# Patient Record
Sex: FEMALE | Race: BLACK OR AFRICAN AMERICAN | NOT HISPANIC OR LATINO | Employment: OTHER | URBAN - METROPOLITAN AREA
[De-identification: names, ages, dates, MRNs, and addresses within clinical notes are randomized per-mention and may not be internally consistent; named-entity substitution may affect disease eponyms.]

---

## 2017-02-13 ENCOUNTER — PATIENT OUTREACH (OUTPATIENT)
Dept: ADMINISTRATIVE | Facility: HOSPITAL | Age: 62
End: 2017-02-13

## 2017-02-17 ENCOUNTER — LAB VISIT (OUTPATIENT)
Dept: LAB | Facility: HOSPITAL | Age: 62
End: 2017-02-17
Attending: INTERNAL MEDICINE
Payer: MEDICARE

## 2017-02-17 ENCOUNTER — OFFICE VISIT (OUTPATIENT)
Dept: INTERNAL MEDICINE | Facility: CLINIC | Age: 62
End: 2017-02-17
Payer: MEDICARE

## 2017-02-17 VITALS
BODY MASS INDEX: 40.36 KG/M2 | WEIGHT: 251.13 LBS | SYSTOLIC BLOOD PRESSURE: 140 MMHG | HEIGHT: 66 IN | DIASTOLIC BLOOD PRESSURE: 110 MMHG | HEART RATE: 92 BPM | TEMPERATURE: 98 F | OXYGEN SATURATION: 95 %

## 2017-02-17 DIAGNOSIS — E66.01 MORBID OBESITY WITH BMI OF 40.0-44.9, ADULT: ICD-10-CM

## 2017-02-17 DIAGNOSIS — I10 HYPERTENSION GOAL BP (BLOOD PRESSURE) < 130/80: Primary | ICD-10-CM

## 2017-02-17 DIAGNOSIS — I10 HYPERTENSION GOAL BP (BLOOD PRESSURE) < 130/80: ICD-10-CM

## 2017-02-17 DIAGNOSIS — E78.5 HYPERLIPIDEMIA LDL GOAL <100: ICD-10-CM

## 2017-02-17 LAB
ALBUMIN SERPL BCP-MCNC: 3.8 G/DL
ALP SERPL-CCNC: 134 U/L
ALT SERPL W/O P-5'-P-CCNC: 6 U/L
ANION GAP SERPL CALC-SCNC: 10 MMOL/L
AST SERPL-CCNC: 11 U/L
BILIRUB SERPL-MCNC: 0.6 MG/DL
BUN SERPL-MCNC: 11 MG/DL
CALCIUM SERPL-MCNC: 10 MG/DL
CHLORIDE SERPL-SCNC: 99 MMOL/L
CHOLEST/HDLC SERPL: 3.8 {RATIO}
CO2 SERPL-SCNC: 28 MMOL/L
CREAT SERPL-MCNC: 1.1 MG/DL
CREAT UR-MCNC: 15 MG/DL
EST. GFR  (AFRICAN AMERICAN): >60 ML/MIN/1.73 M^2
EST. GFR  (NON AFRICAN AMERICAN): 53.6 ML/MIN/1.73 M^2
GLUCOSE SERPL-MCNC: 163 MG/DL
HDL/CHOLESTEROL RATIO: 26.1 %
HDLC SERPL-MCNC: 222 MG/DL
HDLC SERPL-MCNC: 58 MG/DL
LDLC SERPL CALC-MCNC: 149.6 MG/DL
MICROALBUMIN UR DL<=1MG/L-MCNC: 39 UG/ML
MICROALBUMIN/CREATININE RATIO: 260 UG/MG
NONHDLC SERPL-MCNC: 164 MG/DL
POTASSIUM SERPL-SCNC: 3.6 MMOL/L
PROT SERPL-MCNC: 9 G/DL
SODIUM SERPL-SCNC: 137 MMOL/L
TRIGL SERPL-MCNC: 72 MG/DL

## 2017-02-17 PROCEDURE — 80061 LIPID PANEL: CPT

## 2017-02-17 PROCEDURE — 83036 HEMOGLOBIN GLYCOSYLATED A1C: CPT

## 2017-02-17 PROCEDURE — 99214 OFFICE O/P EST MOD 30 MIN: CPT | Mod: S$PBB,,, | Performed by: INTERNAL MEDICINE

## 2017-02-17 PROCEDURE — 36415 COLL VENOUS BLD VENIPUNCTURE: CPT

## 2017-02-17 PROCEDURE — 99999 PR PBB SHADOW E&M-EST. PATIENT-LVL III: CPT | Mod: PBBFAC,,, | Performed by: INTERNAL MEDICINE

## 2017-02-17 PROCEDURE — 80053 COMPREHEN METABOLIC PANEL: CPT

## 2017-02-17 RX ORDER — CLONIDINE HYDROCHLORIDE 0.1 MG/1
0.1 TABLET ORAL
Status: COMPLETED | OUTPATIENT
Start: 2017-02-17 | End: 2017-02-17

## 2017-02-17 RX ORDER — AMLODIPINE BESYLATE 5 MG/1
5 TABLET ORAL DAILY
Qty: 30 TABLET | Refills: 3 | Status: SHIPPED | OUTPATIENT
Start: 2017-02-17 | End: 2017-06-27 | Stop reason: SDUPTHER

## 2017-02-17 RX ORDER — METFORMIN HYDROCHLORIDE 500 MG/1
500 TABLET ORAL 2 TIMES DAILY WITH MEALS
Qty: 60 TABLET | Refills: 3 | Status: SHIPPED | OUTPATIENT
Start: 2017-02-17 | End: 2017-06-15 | Stop reason: SDUPTHER

## 2017-02-17 RX ORDER — LISINOPRIL AND HYDROCHLOROTHIAZIDE 12.5; 2 MG/1; MG/1
2 TABLET ORAL DAILY
Qty: 60 TABLET | Refills: 3 | Status: SHIPPED | OUTPATIENT
Start: 2017-02-17 | End: 2017-06-15 | Stop reason: SINTOL

## 2017-02-17 RX ADMIN — CLONIDINE HYDROCHLORIDE 0.1 MG: 0.1 TABLET ORAL at 12:02

## 2017-02-17 NOTE — PROGRESS NOTES
"Subjective:       Patient ID: Carisa Curry is a 62 y.o. female.    Chief Complaint: Annual Exam    HPI Comments: Carisa Curry  62 y.o. Black or  female    Patient presents with:  Annual Exam    HPI: Here today for an annual exam. She has not been seen in a while and is out of all of her medications. Her bp is significantly elevated but she denies any symptoms.   She has diabetes with microalbuminuria. She is out of medication. She has not been monitoring her glucoses.                    HGBA1C                   11.3 (H)            07/15/2015           HLD--not on medication.                     CHOL                     217 (H)             07/01/2015                          HDL                      48                  07/01/2015                            LDLCALC                  154.0               07/01/2015                           TRIG                     75                  07/01/2015                  Past Medical History:    Arthritis                                                     CHF (congestive heart failure)                                Diabetes mellitus, type 2                                     Diabetic retinopathy                                          Hypertension                                                  Current Outpatient Prescriptions on File Prior to Visit:  blood sugar diagnostic Strp, Twice daily glucose testing., Disp: 60 strip, Rfl: 6  insulin needles, disposable, (PEN NEEDLE) 31 X 5/16 " Ndle, Once a day insulin injection., Disp: 30 each, Rfl: 6  lancets Misc, Twice daily glucose monitoring., Disp: 60 each, Rfl: 6  LANTUS SOLOSTAR 100 unit/mL (3 mL) InPn pen, inject 10 units under skin once a day, Disp: 15 mL, Rfl: 1  potassium chloride (KLOR-CON) 10 MEQ TbSR, Take 1 tablet (10 mEq total) by mouth once daily., Disp: 30 tablet, Rfl: 3  tramadol (ULTRAM) 50 mg tablet, Take 1 tablet (50 mg total) by mouth every 12 (twelve) hours as needed for " Pain., Disp: 60 tablet, Rfl: 1  amlodipine (NORVASC) 5 MG tablet, Take 1 tablet (5 mg total) by mouth once daily., Disp: 30 tablet, Rfl: 3  lisinopril-hydrochlorothiazide (PRINZIDE,ZESTORETIC) 20-12.5 mg per tablet, Take 2 tablets by mouth once daily., Disp: 60 tablet, Rfl: 3  blood-glucose meter kit, Use as instructed, Disp: 1 each, Rfl: 0  metformin (GLUCOPHAGE) 500 MG tablet, Take 1 tablet (500 mg total) by mouth 2 (two) times daily with meals., Disp: 60 tablet, Rfl: 3    Allergies:  Review of patient's allergies indicates:  No Known Allergies          Review of Systems   Constitutional: Negative for fever and unexpected weight change.   Respiratory: Negative for cough and shortness of breath.    Cardiovascular: Negative for chest pain and leg swelling.   Gastrointestinal: Negative for abdominal pain.   Genitourinary: Negative for dysuria and hematuria.   Neurological: Negative for dizziness and headaches.       Objective:      Physical Exam   Constitutional: She is oriented to person, place, and time. She appears well-developed and well-nourished. No distress.   Eyes: No scleral icterus.   Cardiovascular: Normal rate, regular rhythm and normal heart sounds.    Pulmonary/Chest: Effort normal and breath sounds normal. No respiratory distress. She has no wheezes. She has no rales.   Abdominal: Soft. Bowel sounds are normal.   Musculoskeletal: She exhibits no edema.   Neurological: She is alert and oriented to person, place, and time.   Skin: Skin is warm and dry.   Psychiatric: She has a normal mood and affect.   Vitals reviewed.      Assessment:       1. Hypertension goal BP (blood pressure) < 130/80    2. Uncontrolled type 2 diabetes mellitus with microalbuminuria, with long-term current use of insulin    3. Hyperlipidemia LDL goal <100    4. Morbid obesity with BMI of 40.0-44.9, adult        Plan:       Carisa was seen today for annual exam.    Diagnoses and all orders for this visit:    Hypertension goal BP  (blood pressure) < 130/80  -     cloNIDine tablet 0.1 mg; Take 1 tablet (0.1 mg total) by mouth one time. B/P improved following administration of clonidine.  -     Advised to resume previous home medications  -     amlodipine (NORVASC) 5 MG tablet; Take 1 tablet (5 mg total) by mouth once daily.  -     lisinopril-hydrochlorothiazide (PRINZIDE,ZESTORETIC) 20-12.5 mg per tablet; Take 2 tablets by mouth once daily.    Uncontrolled type 2 diabetes mellitus with microalbuminuria, with long-term current use of insulin  -     Microalbumin/creatinine urine ratio  -     Check A1C   -     metformin (GLUCOPHAGE) 500 MG tablet; Take 1 tablet (500 mg total) by mouth 2 (two) times daily with meals.    Hyperlipidemia LDL goal <100  -     Check lipid panel     Morbid obesity with BMI of 40.0-44.9, adult  -     Lifestyle modifications    Labs today.    F/U in 4 weeks.

## 2017-02-17 NOTE — MR AVS SNAPSHOT
[facility]  [address] [facility] [address]  Phone: [phone]  Fax: [phone]                  Carisa Curry   2017 11:20 AM   Office Visit    Description:  Female : 1955   Provider:  Guadalupe Guzman DO   Department:  [facility]           Reason for Visit     Annual Exam           Diagnoses this Visit        Comments    Hypertension goal BP (blood pressure) < 130/80    -  Primary     Uncontrolled type 2 diabetes mellitus with microalbuminuria, with long-term current use of insulin         Morbid obesity with BMI of 40.0-44.9, adult                To Do List           Future Appointments        Provider Department Dept Phone    2017 1:45 PM LAB, SAME DAY [facility] [phone]      Goals (5 Years of Data)     None       These Medications        Disp Refills Start End    amlodipine (NORVASC) 5 MG tablet 30 tablet 3 2017     Take 1 tablet (5 mg total) by mouth once daily. - Oral    Pharmacy: [facility] [address] [place] [address] Ph #: [phone]       lisinopril-hydrochlorothiazide (PRINZIDE,ZESTORETIC) 20-12.5 mg per tablet 60 tablet 3 2017     Take 2 tablets by mouth once daily. - Oral    Pharmacy: [facility] [address] [place] [address] #: [phone]       metformin (GLUCOPHAGE) 500 MG tablet 60 tablet 3 2017    Take 1 tablet (500 mg total) by mouth 2 (two) times daily with meals. - Oral    [facility] [address] [place] [address] Ph #: [phone]         [facility]     [facility] -  Assistance  Registered nurses in the [facility] provide clinical advisement, health education, appointment booking, and other advisory services.  Call for this free service at [phone].             Medications           Message  "regarding Medications     Verify the changes and/or additions to your medication regime listed below are the same as discussed with your clinician today.  If any of these changes or additions are incorrect, please notify your healthcare provider.        These medications were administered today        Dose Freq    cloNIDine tablet 0.1 mg 0.1 mg Clinic/HOD 1 time    Sig: Take 1 tablet (0.1 mg total) by mouth one time.    Class: Normal    Route: Oral           Verify that the below list of medications is an accurate representation of the medications you are currently taking.  If none reported, the list may be blank. If incorrect, please contact your healthcare provider. Carry this list with you in case of emergency.           Current Medications     amlodipine (NORVASC) 5 MG tablet Take 1 tablet (5 mg total) by mouth once daily.    blood sugar diagnostic Strp Twice daily glucose testing.    insulin needles, disposable, (PEN NEEDLE) 31 X 5/16 " Ndle Once a day insulin injection.    lancets Misc Twice daily glucose monitoring.    LANTUS SOLOSTAR 100 unit/mL (3 mL) InPn pen inject 10 units under skin once a day    lisinopril-hydrochlorothiazide (PRINZIDE,ZESTORETIC) 20-12.5 mg per tablet Take 2 tablets by mouth once daily.    potassium chloride (KLOR-CON) 10 MEQ TbSR Take 1 tablet (10 mEq total) by mouth once daily.    tramadol (ULTRAM) 50 mg tablet Take 1 tablet (50 mg total) by mouth every 12 (twelve) hours as needed for Pain.    blood-glucose meter kit Use as instructed    metformin (GLUCOPHAGE) 500 MG tablet Take 1 tablet (500 mg total) by mouth 2 (two) times daily with meals.           Clinical Reference Information           Your Vitals Were     BP Pulse Temp Height Weight SpO2    140/110 92 98.4 °F (36.9 °C) (Tympanic) 5' 6" (1.676 m) 113.9 kg (251 lb 1.7 oz) 95%    BMI                40.53 kg/m2          Blood Pressure          Most Recent Value    BP  (!)  140/110      Allergies as of 2/17/2017     No Known " Allergies      Immunizations Administered on Date of Encounter - 2/17/2017     None      Orders Placed During Today's Visit      Normal Orders This Visit    Microalbumin/creatinine urine ratio       Administrations This Visit     cloNIDine tablet 0.1 mg     Admin Date Action Dose Route Administered By             02/17/2017 Given 0.1 mg Oral Sofiya Freeman LPN                      [facility] Sign-Up     Activating your [facility] account is as easy as 1-2-3!     1) Visit [email or web address], select Sign Up Now, enter this activation code and your date of birth, then select Next.  [email or web address]  Expires: 4/3/2017 12:21 PM      2) Create a username and password to use when you visit [facility] in the future and select a security question in case you lose your password and select Next.    3) Enter your e-mail address and click Sign Up!    Additional Information  If you have questions, please e-mail [email or web address] or call [phone] to talk to our [facility] staff. Remember, [facility] is NOT to be used for urgent needs. For medical emergencies, dial 911.         Language Assistance Services     ATTENTION: Language assistance services are available, free of charge. Please call [phone].      ATENCIÓN: Si roger baker, tiene a richardson disposición servicios gratuitos de asistencia lingüística. Llame al [phone].     CHÚ Ý: N?u b?n nói Ti?ng Vi?t, có các d?ch v? h? tr? ngôn ng? mi?n phí dành cho b?n. G?i s? [phone].         [facility] complies with applicable Federal civil rights laws and does not discriminate on the basis of race, color, national origin, age, disability, or sex.

## 2017-02-18 LAB
ESTIMATED AVG GLUCOSE: 214 MG/DL
HBA1C MFR BLD HPLC: 9.1 %

## 2017-03-06 ENCOUNTER — TELEPHONE (OUTPATIENT)
Dept: INTERNAL MEDICINE | Facility: CLINIC | Age: 62
End: 2017-03-06

## 2017-03-09 ENCOUNTER — TELEPHONE (OUTPATIENT)
Dept: INTERNAL MEDICINE | Facility: CLINIC | Age: 62
End: 2017-03-09

## 2017-03-09 NOTE — TELEPHONE ENCOUNTER
----- Message from Guadalupe Guzman DO sent at 3/6/2017  8:29 AM CST -----  Notify patient protein in urine has worsened. Recommend strict compliance with medication (lisinopril-HCTZ) and avoiding NSAIDs. Repeat urine microalbumin in 3 months.

## 2017-03-09 NOTE — TELEPHONE ENCOUNTER
Pt was informed of lab results and Dr. Guzman recommended strict compliance with diet and medication (lisinopril-HCTZ) ,avoiding NSAID's labs prior to 3 month follow up and pt verbalized understanding.

## 2017-03-09 NOTE — TELEPHONE ENCOUNTER
----- Message from Guadalupe Guzman DO sent at 3/6/2017  8:27 AM CST -----  Notify patient diabetes has improved, but remains uncontrolled. Recommend compliance with diet and medication. Repeat labs and f/u with me in 3 months.

## 2017-05-29 ENCOUNTER — PATIENT OUTREACH (OUTPATIENT)
Dept: ADMINISTRATIVE | Facility: HOSPITAL | Age: 62
End: 2017-05-29

## 2017-06-12 ENCOUNTER — LAB VISIT (OUTPATIENT)
Dept: LAB | Facility: HOSPITAL | Age: 62
End: 2017-06-12
Attending: INTERNAL MEDICINE
Payer: MEDICARE

## 2017-06-12 ENCOUNTER — OFFICE VISIT (OUTPATIENT)
Dept: OPHTHALMOLOGY | Facility: CLINIC | Age: 62
End: 2017-06-12
Payer: MEDICARE

## 2017-06-12 DIAGNOSIS — E11.9 DIABETES MELLITUS TYPE 2 WITHOUT RETINOPATHY: Primary | ICD-10-CM

## 2017-06-12 DIAGNOSIS — I10 HYPERTENSION GOAL BP (BLOOD PRESSURE) < 130/80: ICD-10-CM

## 2017-06-12 DIAGNOSIS — E78.5 HYPERLIPIDEMIA WITH TARGET LDL LESS THAN 100: ICD-10-CM

## 2017-06-12 DIAGNOSIS — H52.03 HYPEROPIA, BILATERAL: ICD-10-CM

## 2017-06-12 DIAGNOSIS — H52.4 BILATERAL PRESBYOPIA: ICD-10-CM

## 2017-06-12 LAB
ALBUMIN SERPL BCP-MCNC: 3.8 G/DL
ALP SERPL-CCNC: 116 U/L
ALT SERPL W/O P-5'-P-CCNC: 9 U/L
ANION GAP SERPL CALC-SCNC: 13 MMOL/L
AST SERPL-CCNC: 11 U/L
BILIRUB SERPL-MCNC: 0.6 MG/DL
BUN SERPL-MCNC: 16 MG/DL
CALCIUM SERPL-MCNC: 10.2 MG/DL
CHLORIDE SERPL-SCNC: 98 MMOL/L
CHOLEST/HDLC SERPL: 3.9 {RATIO}
CO2 SERPL-SCNC: 28 MMOL/L
CREAT SERPL-MCNC: 1.2 MG/DL
CREAT UR-MCNC: 161 MG/DL
EST. GFR  (AFRICAN AMERICAN): 55.6 ML/MIN/1.73 M^2
EST. GFR  (NON AFRICAN AMERICAN): 48.2 ML/MIN/1.73 M^2
ESTIMATED AVG GLUCOSE: 246 MG/DL
GLUCOSE SERPL-MCNC: 199 MG/DL
HBA1C MFR BLD HPLC: 10.2 %
HDL/CHOLESTEROL RATIO: 25.9 %
HDLC SERPL-MCNC: 228 MG/DL
HDLC SERPL-MCNC: 59 MG/DL
LDLC SERPL CALC-MCNC: 143.8 MG/DL
MICROALBUMIN UR DL<=1MG/L-MCNC: 33 UG/ML
MICROALBUMIN/CREATININE RATIO: 20.5 UG/MG
NONHDLC SERPL-MCNC: 169 MG/DL
POTASSIUM SERPL-SCNC: 3.4 MMOL/L
PROT SERPL-MCNC: 9.1 G/DL
SODIUM SERPL-SCNC: 139 MMOL/L
TRIGL SERPL-MCNC: 126 MG/DL

## 2017-06-12 PROCEDURE — 92015 DETERMINE REFRACTIVE STATE: CPT | Mod: ,,, | Performed by: OPTOMETRIST

## 2017-06-12 PROCEDURE — 99211 OFF/OP EST MAY X REQ PHY/QHP: CPT | Mod: PBBFAC | Performed by: OPTOMETRIST

## 2017-06-12 PROCEDURE — 99999 PR PBB SHADOW E&M-EST. PATIENT-LVL I: CPT | Mod: PBBFAC,,, | Performed by: OPTOMETRIST

## 2017-06-12 PROCEDURE — 92014 COMPRE OPH EXAM EST PT 1/>: CPT | Mod: S$PBB,,, | Performed by: OPTOMETRIST

## 2017-06-12 NOTE — PROGRESS NOTES
HPI     NIDDM exam. Watery, itchy eyes a lot. Last eye exam 02/05/2016 [facility].  Last   eye visit 02/15/2016 [facility] for BDR.    Last edited by Karine Freeman on 6/12/2017  2:06 PM. (History)            Assessment /Plan     For exam results, see Encounter Report.    Diabetes mellitus type 2 without retinopathy    Hyperopia, bilateral    Bilateral presbyopia      History of CME and BDR    No Background Diabetic Retinopathy    Dispense Final Rx for glasses.  RTC 1 year

## 2017-06-15 ENCOUNTER — OFFICE VISIT (OUTPATIENT)
Dept: INTERNAL MEDICINE | Facility: CLINIC | Age: 62
End: 2017-06-15
Payer: MEDICARE

## 2017-06-15 VITALS
WEIGHT: 250.25 LBS | OXYGEN SATURATION: 97 % | HEART RATE: 90 BPM | HEIGHT: 66 IN | TEMPERATURE: 98 F | DIASTOLIC BLOOD PRESSURE: 80 MMHG | BODY MASS INDEX: 40.22 KG/M2 | SYSTOLIC BLOOD PRESSURE: 154 MMHG

## 2017-06-15 DIAGNOSIS — R05.9 COUGH: ICD-10-CM

## 2017-06-15 DIAGNOSIS — E66.01 MORBID OBESITY WITH BMI OF 40.0-44.9, ADULT: ICD-10-CM

## 2017-06-15 DIAGNOSIS — E78.5 HYPERLIPIDEMIA LDL GOAL <70: Chronic | ICD-10-CM

## 2017-06-15 DIAGNOSIS — I10 HYPERTENSION GOAL BP (BLOOD PRESSURE) < 130/80: Primary | ICD-10-CM

## 2017-06-15 DIAGNOSIS — Z23 IMMUNIZATION DUE: ICD-10-CM

## 2017-06-15 PROCEDURE — 90732 PPSV23 VACC 2 YRS+ SUBQ/IM: CPT | Mod: PBBFAC

## 2017-06-15 PROCEDURE — 99214 OFFICE O/P EST MOD 30 MIN: CPT | Mod: PBBFAC | Performed by: INTERNAL MEDICINE

## 2017-06-15 PROCEDURE — 4010F ACE/ARB THERAPY RXD/TAKEN: CPT | Mod: ,,, | Performed by: INTERNAL MEDICINE

## 2017-06-15 PROCEDURE — 99999 PR PBB SHADOW E&M-EST. PATIENT-LVL IV: CPT | Mod: PBBFAC,,, | Performed by: INTERNAL MEDICINE

## 2017-06-15 PROCEDURE — 99214 OFFICE O/P EST MOD 30 MIN: CPT | Mod: S$PBB,,, | Performed by: INTERNAL MEDICINE

## 2017-06-15 PROCEDURE — 3046F HEMOGLOBIN A1C LEVEL >9.0%: CPT | Mod: ,,, | Performed by: INTERNAL MEDICINE

## 2017-06-15 RX ORDER — LOSARTAN POTASSIUM AND HYDROCHLOROTHIAZIDE 12.5; 1 MG/1; MG/1
1 TABLET ORAL DAILY
Qty: 30 TABLET | Refills: 3 | Status: SHIPPED | OUTPATIENT
Start: 2017-06-15 | End: 2017-07-06

## 2017-06-15 RX ORDER — BENZONATATE 100 MG/1
100 CAPSULE ORAL 3 TIMES DAILY PRN
Qty: 30 CAPSULE | Refills: 0 | Status: SHIPPED | OUTPATIENT
Start: 2017-06-15 | End: 2017-06-25

## 2017-06-15 RX ORDER — METFORMIN HYDROCHLORIDE 1000 MG/1
1000 TABLET ORAL 2 TIMES DAILY WITH MEALS
Qty: 60 TABLET | Refills: 3 | Status: SHIPPED | OUTPATIENT
Start: 2017-06-15 | End: 2017-10-25 | Stop reason: SDUPTHER

## 2017-06-15 RX ORDER — PRAVASTATIN SODIUM 40 MG/1
40 TABLET ORAL DAILY
Qty: 30 TABLET | Refills: 3 | Status: SHIPPED | OUTPATIENT
Start: 2017-06-15 | End: 2017-10-25 | Stop reason: SDUPTHER

## 2017-06-15 NOTE — PATIENT INSTRUCTIONS
Dulaglutide injection  What is this medicine?  DULAGLUTIDE (DOO la GLOO tide) is used to improve blood sugar control in adults with type 2 diabetes. This medicine may be used with other oral diabetes medicines.  How should I use this medicine?  This medicine is for injection under the skin of your upper leg (thigh), stomach area, or upper arm. It is usually given once every week (every 7 days). You will be taught how to prepare and give this medicine. Use exactly as directed. Take your medicine at regular intervals. Do not take it more often than directed.  If you use this medicine with insulin, you should inject this medicine and the insulin separately. Do not mix them together. Do not give the injections right next to each other. Change (rotate) injection sites with each injection.  It is important that you put your used needles and syringes in a special sharps container. Do not put them in a trash can. If you do not have a sharps container, call your pharmacist or healthcare provider to get one.  A special MedGuide will be given to you by the pharmacist with each prescription and refill. Be sure to read this information carefully each time.  Talk to your pediatrician regarding the use of this medicine in children. Special care may be needed.  What side effects may I notice from receiving this medicine?  Side effects that you should report to your doctor or health care professional as soon as possible:  · allergic reactions like skin rash, itching or hives, swelling of the face, lips, or tongue  · breathing problems  · signs and symptoms of low blood sugar such as feeling anxious, confusion, dizziness, increased hunger, unusually weak or tired, sweating, shakiness, cold, irritable, headache, blurred vision, fast heartbeat, loss of consciousness  · unusual stomach upset or pain  · vomiting  Side effects that usually do not require medical attention (Report these to your doctor or health care professional if they  continue or are bothersome.):diarrhea  · heartburn  · loss of appetite  · nausea  · pain, redness, or irritation at site where injected  What may interact with this medicine?  Do not take this medicine with any of the following medications:  · gatifloxacin  Many medications may cause changes in blood sugar, these include:  · alcohol containing beverages  · aspirin and aspirin-like drugs  · chloramphenicol  · chromium  · diuretics  · female hormones, such as estrogens or progestins, birth control pills  · heart medicines  · isoniazid  · male hormones or anabolic steroids  · medications for weight loss  · medicines for allergies, asthma, cold, or cough  · medicines for mental problems  · medicines called MAO inhibitors - Nardil, Parnate, Marplan, Eldepryl  · niacin  · NSAIDS, such as ibuprofen  · pentamidine  · phenytoin  · probenecid  · quinolone antibiotics such as ciprofloxacin, levofloxacin, ofloxacin  · some herbal dietary supplements  · steroid medicines such as prednisone or cortisone  · thyroid hormonesSome medications can hide the warning symptoms of low blood sugar (hypoglycemia). You may need to monitor your blood sugar more closely if you are taking one of these medications. These include:  · beta-blockers, often used for high blood pressure or heart problems (examples include atenolol, metoprolol, propranolol)  · clonidine  · guanethidine  · reserpine  What if I miss a dose?  If you miss a dose, take it as soon as you can within 3 days after the missed dose. Then take your next dose at your regular weekly time. If it has been longer than 3 days after the missed dose, do not take the missed dose. Take the next dose at your regular time. Do not take double or extra doses. If you have questions about a missed dose, contact your health care provider for advice.  Where should I keep my medicine?  Keep out of the reach of children.  Store this medicine in a refrigerator between 2 and 8 degrees C (36 and 46  degrees F). Do not freeze or use if the medicine has been frozen. Protect from light and excessive heat. Each single-dose pen or prefilled syringe can be kept at room temperature, not to exceed 30 degrees C (86 degrees F) for a total of 14 days, if needed. Store in the carton until use. Throw away any unused medicine after the expiration date.  What should I tell my health care provider before I take this medicine?  They need to know if you have any of these conditions:  · endocrine tumors (MEN 2) or if someone in your family had these tumors  · history of pancreatitis  · kidney disease  · liver disease  · stomach problems  · thyroid cancer or if someone in your family had thyroid cancer  · an unusual or allergic reaction to dulaglutide, other medicines, foods, dyes, or preservatives  · pregnant or trying to get pregnant  · breast-feeding  What should I watch for while using this medicine?  Visit your doctor or health care professional for regular checks on your progress.  A test called the HbA1C (A1C) will be monitored. This is a simple blood test. It measures your blood sugar control over the last 2 to 3 months. You will receive this test every 3 to 6 months.  Learn how to check your blood sugar. Learn the symptoms of low and high blood sugar and how to manage them.  Always carry a quick-source of sugar with you in case you have symptoms of low blood sugar. Examples include hard sugar candy or glucose tablets. Make sure others know that you can choke if you eat or drink when you develop serious symptoms of low blood sugar, such as seizures or unconsciousness. They must get medical help at once.  Tell your doctor or health care professional if you have high blood sugar. You might need to change the dose of your medicine. If you are sick or exercising more than usual, you might need to change the dose of your medicine.  Do not skip meals. Ask your doctor or health care professional if you should avoid alcohol. Many  nonprescription cough and cold products contain sugar or alcohol. These can affect blood sugar.  Wear a medical ID bracelet or chain, and carry a card that describes your disease and details of your medicine and dosage times.  Date Last Reviewed:   NOTE:This sheet is a summary. It may not cover all possible information. If you have questions about this medicine, talk to your doctor, pharmacist, or health care provider. Copyright© 2016 [employer]

## 2017-06-16 NOTE — PROGRESS NOTES
Subjective:       Patient ID: Carisa Curry is a 62 y.o. female.    Chief Complaint: Follow-up (3 month follow up)    Carisa Curry  62 y.o. Black or  female    Patient presents with:  Follow-up: 3 month follow up    HPI: Here today to follow up on chronic conditions. She is here with her caregiver.   HTN--uncontrolled. She has been compliant with amlodipine and lisinopril-HCTZ. She has been having a cough for a while. Most of the time it is dry. She denies allergy symptoms.   Diabetes with microalbuminuria--worsened diabetes and improved microalbuminuria. She is compliant with metformin but not Lantus. Her giver is unable to give her the injection daily and would like something that can be given weekly.                      HGBA1C                   10.2 (H)            06/12/2017            HLD--not on medication.                      CHOL                     228 (H)             06/12/2017                 HDL                      59                  06/12/2017                 LDLCALC                  143.8               06/12/2017                TRIG                     72                  02/17/2017                Past Medical History:  Arthritis  CHF (congestive heart failure)  Diabetes mellitus, type 2  Diabetic retinopathy  Hyperlipidemia   Hypertension    Medications:   Reviewed     Allergies:  Review of patient's allergies indicates:  No Known Allergies        Review of Systems   Constitutional: Negative for fever and unexpected weight change.   Eyes: Negative for visual disturbance.   Respiratory: Negative for shortness of breath.    Cardiovascular: Positive for leg swelling. Negative for chest pain.   Gastrointestinal: Negative for abdominal pain, constipation and diarrhea.   Genitourinary: Negative for dysuria.   Musculoskeletal: Positive for arthralgias.   Neurological: Negative for dizziness, numbness and headaches.       Objective:      Physical Exam   Constitutional: She  is oriented to person, place, and time. She appears well-developed and well-nourished. No distress.   Eyes: No scleral icterus.   Cardiovascular: Normal rate, regular rhythm, normal heart sounds and intact distal pulses.    Pulses:       Dorsalis pedis pulses are 1+ on the right side, and 1+ on the left side.   Pulmonary/Chest: Effort normal and breath sounds normal. No respiratory distress.   Abdominal: Soft. Bowel sounds are normal.   Musculoskeletal: She exhibits no edema.   Feet:   Right Foot:   Protective Sensation: 4 sites tested. 4 sites sensed.   Skin Integrity: Positive for callus and dry skin. Negative for ulcer.   Left Foot:   Protective Sensation: 4 sites tested. 4 sites sensed.   Skin Integrity: Positive for callus and dry skin. Negative for ulcer.   Neurological: She is alert and oriented to person, place, and time.   Skin: Skin is warm and dry.   Psychiatric: She has a normal mood and affect.   Vitals reviewed.      Assessment:       1. Hypertension goal BP (blood pressure) < 130/80    2. Cough    3. Uncontrolled type 2 diabetes mellitus with microalbuminuria, with long-term current use of insulin    4. Hyperlipidemia LDL goal <70    5. Morbid obesity with BMI of 40.0-44.9, adult    6. Immunization due        Plan:       Carisa was seen today for follow-up.    Diagnoses and all orders for this visit:    Hypertension goal BP (blood pressure) < 130/80  -     Change to losartan-hydrochlorothiazide 100-12.5 mg (HYZAAR) 100-12.5 mg Tab; Take 1 tablet by mouth once daily.    Cough  -     May be due to ACE inhibitor--medication changed  -     benzonatate (TESSALON) 100 MG capsule; Take 1 capsule (100 mg total) by mouth 3 (three) times daily as needed for Cough.    Uncontrolled type 2 diabetes mellitus with microalbuminuria, with long-term current use of insulin  -     metformin (GLUCOPHAGE) 1000 MG tablet; Take 1 tablet (1,000 mg total) by mouth 2 (two) times daily with meals.  -     dulaglutide 0.75 mg/0.5  mL PnIj; Inject 0.5 mLs (0.75 mg total) into the skin every 7 days. Discussed medication risks and benefits.   -     losartan-hydrochlorothiazide 100-12.5 mg (HYZAAR) 100-12.5 mg Tab; Take 1 tablet by mouth once daily.  -     Ambulatory consult to Podiatry    Hyperlipidemia LDL goal <70  -     pravastatin (PRAVACHOL) 40 MG tablet; Take 1 tablet (40 mg total) by mouth once daily.    Morbid obesity with BMI of 40.0-44.9, adult  -     Lifestyle modifications    Immunization due  -     (In Office Administered) Pneumococcal Polysaccharide Vaccine (23 Valent) (SQ/IM)    F/U in 2 weeks for HTN.

## 2017-06-27 ENCOUNTER — OFFICE VISIT (OUTPATIENT)
Dept: INTERNAL MEDICINE | Facility: CLINIC | Age: 62
End: 2017-06-27
Payer: MEDICARE

## 2017-06-27 ENCOUNTER — TELEPHONE (OUTPATIENT)
Dept: INTERNAL MEDICINE | Facility: CLINIC | Age: 62
End: 2017-06-27

## 2017-06-27 ENCOUNTER — CLINICAL SUPPORT (OUTPATIENT)
Dept: INTERNAL MEDICINE | Facility: CLINIC | Age: 62
End: 2017-06-27
Payer: MEDICARE

## 2017-06-27 ENCOUNTER — OFFICE VISIT (OUTPATIENT)
Dept: PODIATRY | Facility: CLINIC | Age: 62
End: 2017-06-27
Payer: MEDICARE

## 2017-06-27 VITALS
SYSTOLIC BLOOD PRESSURE: 206 MMHG | HEIGHT: 66 IN | DIASTOLIC BLOOD PRESSURE: 98 MMHG | HEART RATE: 91 BPM | BODY MASS INDEX: 41.37 KG/M2 | WEIGHT: 257.38 LBS

## 2017-06-27 VITALS
OXYGEN SATURATION: 99 % | DIASTOLIC BLOOD PRESSURE: 98 MMHG | DIASTOLIC BLOOD PRESSURE: 102 MMHG | TEMPERATURE: 99 F | BODY MASS INDEX: 41.38 KG/M2 | HEIGHT: 66 IN | SYSTOLIC BLOOD PRESSURE: 206 MMHG | SYSTOLIC BLOOD PRESSURE: 206 MMHG | HEART RATE: 91 BPM | WEIGHT: 257.5 LBS

## 2017-06-27 DIAGNOSIS — B35.1 DERMATOPHYTOSIS OF NAIL: ICD-10-CM

## 2017-06-27 DIAGNOSIS — E11.9 COMPREHENSIVE DIABETIC FOOT EXAMINATION, TYPE 2 DM, ENCOUNTER FOR: Primary | ICD-10-CM

## 2017-06-27 DIAGNOSIS — I10 HYPERTENSION GOAL BP (BLOOD PRESSURE) < 130/80: ICD-10-CM

## 2017-06-27 DIAGNOSIS — E11.49 TYPE II DIABETES MELLITUS WITH NEUROLOGICAL MANIFESTATIONS: ICD-10-CM

## 2017-06-27 PROCEDURE — 99213 OFFICE O/P EST LOW 20 MIN: CPT | Mod: S$PBB,,, | Performed by: PHYSICIAN ASSISTANT

## 2017-06-27 PROCEDURE — 11721 DEBRIDE NAIL 6 OR MORE: CPT | Mod: Q9,PBBFAC | Performed by: PODIATRIST

## 2017-06-27 PROCEDURE — 4010F ACE/ARB THERAPY RXD/TAKEN: CPT | Mod: ,,, | Performed by: PODIATRIST

## 2017-06-27 PROCEDURE — 99999 PR PBB SHADOW E&M-EST. PATIENT-LVL III: CPT | Mod: PBBFAC,,, | Performed by: PODIATRIST

## 2017-06-27 PROCEDURE — 99999 PR PBB SHADOW E&M-EST. PATIENT-LVL IV: CPT | Mod: PBBFAC,,, | Performed by: PHYSICIAN ASSISTANT

## 2017-06-27 PROCEDURE — 99204 OFFICE O/P NEW MOD 45 MIN: CPT | Mod: 25,S$PBB,, | Performed by: PODIATRIST

## 2017-06-27 PROCEDURE — 11721 DEBRIDE NAIL 6 OR MORE: CPT | Mod: Q9,S$PBB,, | Performed by: PODIATRIST

## 2017-06-27 PROCEDURE — 99213 OFFICE O/P EST LOW 20 MIN: CPT | Mod: PBBFAC,27,25 | Performed by: PODIATRIST

## 2017-06-27 PROCEDURE — 3046F HEMOGLOBIN A1C LEVEL >9.0%: CPT | Mod: ,,, | Performed by: PODIATRIST

## 2017-06-27 PROCEDURE — 99999 PR PBB SHADOW E&M-EST. PATIENT-LVL I: CPT | Mod: PBBFAC,,,

## 2017-06-27 RX ORDER — AMLODIPINE BESYLATE 10 MG/1
10 TABLET ORAL DAILY
Qty: 30 TABLET | Refills: 3 | Status: SHIPPED | OUTPATIENT
Start: 2017-06-27 | End: 2017-10-10 | Stop reason: SDUPTHER

## 2017-06-27 RX ORDER — AMLODIPINE BESYLATE 5 MG/1
10 TABLET ORAL DAILY
Qty: 30 TABLET | Refills: 3 | Status: SHIPPED | OUTPATIENT
Start: 2017-06-27 | End: 2017-06-27 | Stop reason: SDUPTHER

## 2017-06-27 NOTE — PROGRESS NOTES
Patient is feeling fine. She states she is not experiencing any headaches or dizziness. She has recently started on Hyzarr one week ago.

## 2017-06-27 NOTE — TELEPHONE ENCOUNTER
Spoke to Iza with [facility] and clarified RX that was sent per ERX by Mr Patricia.   Amlodipine 10 mg daily, discontinue the amlodipine 5 mg.   Iza verbalized understanding.

## 2017-06-27 NOTE — PROGRESS NOTES
Subjective:     Patient ID: Carisa Curry is a 62 y.o. female.    Chief Complaint: Diabetic Foot Exam (Last PCP visit with -6/15/17//[facility]-6/27/17. ) and Nail Care (Patient states no current pain or problems.)    Carisa is a 62 y.o. female who presents to the clinic for evaluation and treatment of high risk feet. Carisa has a past medical history of Arthritis; CHF (congestive heart failure); Diabetes mellitus, type 2; Diabetic retinopathy; Hyperlipidemia; and Hypertension. The patient's chief complaint is long, thick toenails. This patient has documented high risk feet requiring routine maintenance secondary to diabetes mellitis and those secondary complications of diabetes, as mentioned..    PCP: Guadalupe Guzman DO    Date Last Seen by PCP: 6/15/17    Current shoe gear:  Affected Foot: Slip-on shoes     Unaffected Foot: Slip-on shoes    Hemoglobin A1C   Date Value Ref Range Status   06/12/2017 10.2 (H) 4.0 - 5.6 % Final     Comment:     According to ADA guidelines, hemoglobin A1c <7.0% represents  optimal control in non-pregnant diabetic patients. Different  metrics may apply to specific patient populations.   Standards of Medical Care in Diabetes-2016.  For the purpose of screening for the presence of diabetes:  <5.7%     Consistent with the absence of diabetes  5.7-6.4%  Consistent with increasing risk for diabetes   (prediabetes)  >or=6.5%  Consistent with diabetes  Currently, no consensus exists for use of hemoglobin A1c  for diagnosis of diabetes for children.  This Hemoglobin A1c assay has significant interference with fetal   hemoglobin   (HbF). The results are invalid for patients with abnormal amounts of   HbF,   including those with known Hereditary Persistence   of Fetal Hemoglobin. Heterozygous hemoglobin variants (HbAS, HbAC,   HbAD, HbAE, HbA2) do not significantly interfere with this assay;   however, presence of multiple variants in a sample may impact the %   interference.    "  02/17/2017 9.1 (H) 4.5 - 6.2 % Final     Comment:     According to ADA guidelines, hemoglobin A1C <7.0% represents  optimal control in non-pregnant diabetic patients.  Different  metrics may apply to specific populations.   Standards of Medical Care in Diabetes - 2016.  For the purpose of screening for the presence of diabetes:  <5.7%     Consistent with the absence of diabetes  5.7-6.4%  Consistent with increasing risk for diabetes   (prediabetes)  >or=6.5%  Consistent with diabetes  Currently no consensus exists for use of hemoglobin A1C  for diagnosis of diabetes for children.     07/15/2015 11.3 (H) 4.5 - 6.2 % Final           Patient Active Problem List   Diagnosis    Hypertension goal BP (blood pressure) < 130/80    Arthritis involving multiple sites    Uncontrolled type 2 diabetes mellitus with microalbuminuria    Hyperlipidemia LDL goal <70       Medication List with Changes/Refills   Current Medications    BLOOD SUGAR DIAGNOSTIC STRP    Twice daily glucose testing.    BLOOD-GLUCOSE METER KIT    Use as instructed    DULAGLUTIDE 0.75 MG/0.5 ML PNIJ    Inject 0.5 mLs (0.75 mg total) into the skin every 7 days.    INSULIN NEEDLES, DISPOSABLE, (PEN NEEDLE) 31 X 5/16 " NDLE    Once a day insulin injection.    LANCETS MISC    Twice daily glucose monitoring.    LOSARTAN-HYDROCHLOROTHIAZIDE 100-12.5 MG (HYZAAR) 100-12.5 MG TAB    Take 1 tablet by mouth once daily.    METFORMIN (GLUCOPHAGE) 1000 MG TABLET    Take 1 tablet (1,000 mg total) by mouth 2 (two) times daily with meals.    POTASSIUM CHLORIDE (KLOR-CON) 10 MEQ TBSR    Take 1 tablet (10 mEq total) by mouth once daily.    PRAVASTATIN (PRAVACHOL) 40 MG TABLET    Take 1 tablet (40 mg total) by mouth once daily.   Changed and/or Refilled Medications    Modified Medication Previous Medication    AMLODIPINE (NORVASC) 10 MG TABLET amlodipine (NORVASC) 5 MG tablet       Take 1 tablet (10 mg total) by mouth once daily.    Take 2 tablets (10 mg total) by mouth " "once daily.       Review of patient's allergies indicates:  No Known Allergies    Past Surgical History:   Procedure Laterality Date    EYE SURGERY      SPINE SURGERY         Family History   Problem Relation Age of Onset    Kidney disease Mother     Heart failure Mother     Cataracts Mother     Hypertension Mother     Cancer Father     Diabetes Father     Diabetes Maternal Aunt     Hypertension Maternal Aunt     Diabetes Paternal Aunt     Hypertension Paternal Aunt     Diabetes Paternal Uncle        Social History     Social History    Marital status: Single     Spouse name: N/A    Number of children: N/A    Years of education: N/A     Occupational History    Not on file.     Social History Main Topics    Smoking status: Never Smoker    Smokeless tobacco: Never Used    Alcohol use No    Drug use: No    Sexual activity: No     Other Topics Concern    Not on file     Social History Narrative    No narrative on file       Vitals:    06/27/17 1519   BP: (!) 206/98   Pulse: 91   Weight: 116.7 kg (257 lb 6.2 oz)   Height: 5' 6" (1.676 m)   PainSc: 0-No pain       Hemoglobin A1C   Date Value Ref Range Status   06/12/2017 10.2 (H) 4.0 - 5.6 % Final     Comment:     According to ADA guidelines, hemoglobin A1c <7.0% represents  optimal control in non-pregnant diabetic patients. Different  metrics may apply to specific patient populations.   Standards of Medical Care in Diabetes-2016.  For the purpose of screening for the presence of diabetes:  <5.7%     Consistent with the absence of diabetes  5.7-6.4%  Consistent with increasing risk for diabetes   (prediabetes)  >or=6.5%  Consistent with diabetes  Currently, no consensus exists for use of hemoglobin A1c  for diagnosis of diabetes for children.  This Hemoglobin A1c assay has significant interference with fetal   hemoglobin   (HbF). The results are invalid for patients with abnormal amounts of   HbF,   including those with known Hereditary Persistence "   of Fetal Hemoglobin. Heterozygous hemoglobin variants (HbAS, HbAC,   HbAD, HbAE, HbA2) do not significantly interfere with this assay;   however, presence of multiple variants in a sample may impact the %   interference.     02/17/2017 9.1 (H) 4.5 - 6.2 % Final     Comment:     According to ADA guidelines, hemoglobin A1C <7.0% represents  optimal control in non-pregnant diabetic patients.  Different  metrics may apply to specific populations.   Standards of Medical Care in Diabetes - 2016.  For the purpose of screening for the presence of diabetes:  <5.7%     Consistent with the absence of diabetes  5.7-6.4%  Consistent with increasing risk for diabetes   (prediabetes)  >or=6.5%  Consistent with diabetes  Currently no consensus exists for use of hemoglobin A1C  for diagnosis of diabetes for children.     07/15/2015 11.3 (H) 4.5 - 6.2 % Final       Review of Systems   Constitutional: Negative for chills and fever.   Respiratory: Negative for shortness of breath.    Cardiovascular: Negative for chest pain, palpitations, orthopnea, claudication and leg swelling.   Gastrointestinal: Negative for diarrhea, nausea and vomiting.   Musculoskeletal: Negative for joint pain.   Skin: Negative for rash.   Neurological: Positive for tingling and sensory change. Negative for dizziness, focal weakness and weakness.   Psychiatric/Behavioral: Negative.              Objective:      PHYSICAL EXAM: Apperance: Alert and orient in no distress,well developed, and with good attention to grooming and body habits  Patient presents ambulating in flat shoes.   LOWER EXTREMITY EXAM:  VASCULAR: Dorsalis pedis pulses 2/4 bilateral and Posterior Tibial pulses 1/4 bilateral. Capillary fill time <4 seconds bilateral. Mild edema observed bilateral. Varicosities absent bilateral. Skin temperature of the lower extremities is warm to cool, proximal to distal. Hair growth absent bilateral.  DERMATOLOGICAL: No skin rashes, subcutaneous nodules, lesions,  or ulcers observed bilateral. Nails 1,2,3,4,5 bilateral elongated, thickened, and discolored. Webspaces 1-4 clean, dry and without evidence of break in skin integrity bilateral.   NEUROLOGICAL: Light touch, sharp-dull, proprioception all present and equal bilaterally.  Vibratory sensation diminished at bilateral hallux and navicular. Protective sensation absent at 3/10 sites as tested with a [place]esh 5.07 monofilament.   MUSCULOSKELETAL: Muscle strength is 5/5 for foot inverters, everters, plantarflexors, and dorsiflexors. Muscle tone is normal.           Assessment:       Encounter Diagnoses   Name Primary?    Comprehensive diabetic foot examination, type 2 DM, encounter for Yes    Type II diabetes mellitus with neurological manifestations     Dermatophytosis of nail          Plan:   Comprehensive diabetic foot examination, type 2 DM, encounter for    Type II diabetes mellitus with neurological manifestations    Dermatophytosis of nail      I counseled the patient on her conditions, regarding findings of my examination, my impressions, and usual treatment plan.   Greater than 50% of this visit spent on counseling and coordination of care.  Greater than 20 minutes spent on education about the diabetic foot, neuropathy, and prevention of limb loss.  Shoe inspection. Diabetic Foot Education. Patient reminded of the importance of good nutrition and blood sugar control to help prevent podiatric complications of diabetes. Patient instructed on proper foot hygeine. We discussed wearing proper shoe gear, daily foot inspections, never walking without protective shoe gear, never putting sharp instruments to feet.    With patient's permission, nails 1-5 bilateral were trimmed in length and thickness aggressively to their soft tissue attachment mechanically and with electric , removing all offending nail and debris. Patient relates relief following the procedure.  Patient  will continue to monitor the areas  daily, inspect feet, wear protective shoe gear when ambulatory, moisturizer to maintain skin integrity. Patient reminded of the importance of good nutrition and blood sugar control to help prevent podiatric complications of diabetes.  Patient to return 4 months or sooner if needed.                 Jovani Acosta DPM  [facility]

## 2017-06-27 NOTE — PATIENT INSTRUCTIONS
Controlling High Blood Pressure  High blood pressure (hypertension) is often called the silent killer. This is because many people who have it dont know it. High blood pressure is defined as 140/90 mm Hg or higher. Know your blood pressure and remember to check it regularly. Doing so can save your life. Here are some things you can do to help control your blood pressure.    Choose heart-healthy foods  · Select low-salt, low-fat foods. Limit sodium intake to 2,400 mg per day or the amount suggested by your healthcare provider.  · Limit canned, dried, cured, packaged, and fast foods. These can contain a lot of salt.  · Eat 8 to 10 servings of fruits and vegetables every day.  · Choose lean meats, fish, or chicken.  · Eat whole-grain pasta, brown rice, and beans.  · Eat 2 to 3 servings of low-fat or fat-free dairy products.  · Ask your doctor about the DASH eating plan. This plan helps reduce blood pressure.  · When you go to a restaurant, ask that your meal be prepared with no added salt.  Maintain a healthy weight  · Ask your healthcare provider how many calories to eat a day. Then stick to that number.  · Ask your healthcare provider what weight range is healthiest for you. If you are overweight, a weight loss of only 3% to 5% of your body weight can help lower blood pressure. Generally, a good weight loss goal is to lose 10% of your body weight in a year.  · Limit snacks and sweets.  · Get regular exercise.  Get up and get active  · Choose activities you enjoy. Find ones you can do with friends or family. This includes bicycling, dancing, walking, and jogging.  · Park farther away from building entrances.  · Use stairs instead of the elevator.  · When you can, walk or bike instead of driving.  · Monc[place] leaves, garden, or do household repairs.  · Be active at a moderate to vigorous level of physical activity for at least 40 minutes for a minimum of 3 to 4 days a week.   Manage stress  · Make time to relax and  enjoy life. Find time to laugh.  · Communicate your concerns with your loved ones and your healthcare provider.  · Visit with family and friends, and keep up with hobbies.  Limit alcohol and quit smoking  · Men should have no more than 2 drinks per day.  · Women should have no more than 1 drink per day.  · Talk with your healthcare provider about quitting smoking. Smoking significantly increases your risk for heart disease and stroke. Ask your healthcare provider about community smoking cessation programs and other options.  Medicines  If lifestyle changes arent enough, your healthcare provider may prescribe high blood pressure medicine. Take all medicines as prescribed. If you have any questions about your medicines, ask your healthcare provider before stopping or changing them.   © 2569-3410 [employer]. [address] [place] [address], [place] [address]. All rights reserved. This information is not intended as a substitute for professional medical care. Always follow your healthcare professional's instructions.          Long-Term Complications of Diabetes  Diabetes can cause health problems over time. These are called complications. They are more likely to occur if your blood sugar is often too high. Over time, high blood sugar can damage blood vessels in your body. It is important to keep your blood sugar in your target range. This can help prevent or delay complications from diabetes.    Possible complications  Complications of diabetes include:  · Eye problems, including damage to the blood vessels in the eyes (retinopathy), pressure in the eye (glaucoma), and clouding of the eyes lens (a cataract). Eye problems can eventually lead to irreversible blindness.   · Tooth and gum problems (periodontal disease), causing loss of teeth and bone  · Blood vessel (vascular) disease leading to circulation problems, heart attack or stroke, or a need for amputation of a limb   · Problems with sexual function leading  to erectile dysfunction in men and sexual discomfort in women   · Kidney disease (nephropathy) can eventually lead to kidney failure, which may require dialysis or kidney transplant   · Nerve problems (neuropathy), causing pain or loss of feeling in your feet and other parts of your body, potentially leading to an amputation of a limb   · High blood pressure (hypertension), putting strain on your heart and blood vessels  · Serious infections, possibly leading to loss of toes, feet, or limbs  How to avoid complications  The serious consequences of these complications are completely avoidable for most people with diabetes by managing your blood glucose, blood pressure, and cholesterol levels. This can help you feel better and stay healthy. You can manage diabetes by tracking your blood sugar. You can also eat healthy and exercise. And you should take medication if directed by your health care provider.  © 6678-8545 [employer]. [address] [place] [address], [place] [address]. All rights reserved. This information is not intended as a substitute for professional medical care. Always follow your healthcare professional's instructions.

## 2017-06-27 NOTE — LETTER
July 5, 2017      Guadalupe Guzman DO  [address] [place] [address] Dr Jcarlos FLOREZ [id number]           [facility]  [address] [place] [address] Nathanael FLOREZ [id number]  Phone: [phone]  Fax: [phone]          Patient: Carisa Curry   MR Number: [id number]   YOB: 1955   Date of Visit: 6/27/2017       Dear Dr. Guadalupe Guzman:    Thank you for referring Carisa Curry to me for evaluation. Attached you will find relevant portions of my assessment and plan of care.    If you have questions, please do not hesitate to call me. I look forward to following Carisa Curry along with you.    Sincerely,    Gerardo Barakat  CC:  No Recipients    If you would like to receive this communication electronically, please contact [email or web address] or [phone] to request more information on [employer] access.    For providers and/or their staff who would like to refer a patient to [facility], please contact us through our one-stop-shop provider referral line, Nimo Pabon, at [phone].    If you feel you have received this communication in error or would no longer like to receive these types of communications, please e-mail [email or web address]

## 2017-06-27 NOTE — PROGRESS NOTES
Subjective:       Patient ID: Carisa Curry is a 62 y.o. female.    Chief Complaint: Hypertension    Patient's blood pressure medication was recently changes from lisinopril to losartan due to a cough. Her cough has resolved, but her blood pressure is now very elevated.       Hypertension   Pertinent negatives include no blurred vision, chest pain, malaise/fatigue, neck pain, palpitations or shortness of breath. Risk factors for coronary artery disease include diabetes mellitus, obesity, dyslipidemia, family history, post-menopausal state and sedentary lifestyle. Past treatments include angiotensin blockers, diuretics and calcium channel blockers. There are no compliance problems.      Review of Systems   Constitutional: Negative for chills, fatigue, fever and malaise/fatigue.   Eyes: Negative for blurred vision.   Respiratory: Negative for chest tightness and shortness of breath.    Cardiovascular: Negative for chest pain and palpitations.   Gastrointestinal: Negative for abdominal pain.   Musculoskeletal: Negative for neck pain.       Objective:      Physical Exam   Constitutional: She appears well-developed and well-nourished. No distress.   HENT:   Head: Normocephalic and atraumatic.   Cardiovascular: Normal rate and regular rhythm.  Exam reveals no gallop and no friction rub.    No murmur heard.  Pulmonary/Chest: Effort normal and breath sounds normal. No respiratory distress. She has no wheezes. She has no rales. She exhibits no tenderness.   Abdominal: Soft. There is no tenderness.   Skin: She is not diaphoretic.   Nursing note and vitals reviewed.      Assessment:       1. Hypertension goal BP (blood pressure) < 130/80        Plan:       Hypertension goal BP (blood pressure) < 130/80  -     Discontinue: amlodipine (NORVASC) 5 MG tablet; Take 2 tablets (10 mg total) by mouth once daily.  Dispense: 30 tablet; Refill: 3  -     amlodipine (NORVASC) 10 MG tablet; Take 1 tablet (10 mg total) by mouth once  daily.  Dispense: 30 tablet; Refill: 3

## 2017-07-06 ENCOUNTER — TELEPHONE (OUTPATIENT)
Dept: INTERNAL MEDICINE | Facility: CLINIC | Age: 62
End: 2017-07-06

## 2017-07-06 ENCOUNTER — CLINICAL SUPPORT (OUTPATIENT)
Dept: INTERNAL MEDICINE | Facility: CLINIC | Age: 62
End: 2017-07-06
Payer: MEDICARE

## 2017-07-06 VITALS — SYSTOLIC BLOOD PRESSURE: 188 MMHG | DIASTOLIC BLOOD PRESSURE: 96 MMHG

## 2017-07-06 DIAGNOSIS — I10 ESSENTIAL HYPERTENSION: Primary | ICD-10-CM

## 2017-07-06 DIAGNOSIS — I15.9 SECONDARY HYPERTENSION: Primary | ICD-10-CM

## 2017-07-06 PROCEDURE — 99999 PR PBB SHADOW E&M-EST. PATIENT-LVL II: CPT | Mod: PBBFAC,,,

## 2017-07-06 PROCEDURE — 99212 OFFICE O/P EST SF 10 MIN: CPT | Mod: PBBFAC

## 2017-07-06 RX ORDER — LOSARTAN POTASSIUM AND HYDROCHLOROTHIAZIDE 25; 100 MG/1; MG/1
1 TABLET ORAL DAILY
Qty: 90 TABLET | Refills: 0 | Status: SHIPPED | OUTPATIENT
Start: 2017-07-06 | End: 2017-10-25 | Stop reason: SDUPTHER

## 2017-07-06 NOTE — TELEPHONE ENCOUNTER
Patient presented today for nurses visit to have blood pressure checked. She is on Hyzaar 100-12.5 and recently had amlodipine increased to 10 mg. Blood pressure still elevated today. Will increase Hyzaar to 100-25 and have her return for recheck in 2 weeks.

## 2017-07-06 NOTE — PROGRESS NOTES
Patient bp was 188/96. Reported to Dr. Castorena. Dr. Castorena increased BP meds and wants to re-check patient in 2 weeks.

## 2017-10-10 DIAGNOSIS — I10 HYPERTENSION GOAL BP (BLOOD PRESSURE) < 130/80: ICD-10-CM

## 2017-10-10 RX ORDER — AMLODIPINE BESYLATE 10 MG/1
10 TABLET ORAL DAILY
Qty: 30 TABLET | Refills: 3 | Status: SHIPPED | OUTPATIENT
Start: 2017-10-10 | End: 2017-10-25 | Stop reason: SDUPTHER

## 2017-10-13 ENCOUNTER — PATIENT OUTREACH (OUTPATIENT)
Dept: ADMINISTRATIVE | Facility: HOSPITAL | Age: 62
End: 2017-10-13

## 2017-10-13 NOTE — PROGRESS NOTES
Spoke with Ms [email or web address] for Ms Patricio. Assisted to reschedule lab/dr visit. Reminder slip mailed to home

## 2017-10-18 ENCOUNTER — LAB VISIT (OUTPATIENT)
Dept: LAB | Facility: HOSPITAL | Age: 62
End: 2017-10-18
Attending: INTERNAL MEDICINE
Payer: MEDICARE

## 2017-10-18 DIAGNOSIS — I10 HYPERTENSION GOAL BP (BLOOD PRESSURE) < 130/80: ICD-10-CM

## 2017-10-18 DIAGNOSIS — E78.5 HYPERLIPIDEMIA LDL GOAL <100: ICD-10-CM

## 2017-10-18 LAB
ALBUMIN SERPL BCP-MCNC: 3.3 G/DL
ALP SERPL-CCNC: 124 U/L
ALT SERPL W/O P-5'-P-CCNC: 9 U/L
ANION GAP SERPL CALC-SCNC: 10 MMOL/L
AST SERPL-CCNC: 11 U/L
BILIRUB SERPL-MCNC: 0.4 MG/DL
BUN SERPL-MCNC: 15 MG/DL
CALCIUM SERPL-MCNC: 9.7 MG/DL
CHLORIDE SERPL-SCNC: 100 MMOL/L
CHOLEST SERPL-MCNC: 156 MG/DL
CHOLEST/HDLC SERPL: 3 {RATIO}
CO2 SERPL-SCNC: 28 MMOL/L
CREAT SERPL-MCNC: 1.1 MG/DL
EST. GFR  (AFRICAN AMERICAN): >60 ML/MIN/1.73 M^2
EST. GFR  (NON AFRICAN AMERICAN): 53.6 ML/MIN/1.73 M^2
ESTIMATED AVG GLUCOSE: 194 MG/DL
GLUCOSE SERPL-MCNC: 162 MG/DL
HBA1C MFR BLD HPLC: 8.4 %
HDLC SERPL-MCNC: 52 MG/DL
HDLC SERPL: 33.3 %
LDLC SERPL CALC-MCNC: 89.2 MG/DL
NONHDLC SERPL-MCNC: 104 MG/DL
POTASSIUM SERPL-SCNC: 3.6 MMOL/L
PROT SERPL-MCNC: 8.6 G/DL
SODIUM SERPL-SCNC: 138 MMOL/L
TRIGL SERPL-MCNC: 74 MG/DL

## 2017-10-18 PROCEDURE — 80061 LIPID PANEL: CPT

## 2017-10-18 PROCEDURE — 36415 COLL VENOUS BLD VENIPUNCTURE: CPT

## 2017-10-18 PROCEDURE — 83036 HEMOGLOBIN GLYCOSYLATED A1C: CPT

## 2017-10-18 PROCEDURE — 80053 COMPREHEN METABOLIC PANEL: CPT

## 2017-10-25 ENCOUNTER — OFFICE VISIT (OUTPATIENT)
Dept: INTERNAL MEDICINE | Facility: CLINIC | Age: 62
End: 2017-10-25
Payer: MEDICARE

## 2017-10-25 VITALS
BODY MASS INDEX: 41.85 KG/M2 | HEART RATE: 84 BPM | WEIGHT: 260.38 LBS | HEIGHT: 66 IN | DIASTOLIC BLOOD PRESSURE: 94 MMHG | OXYGEN SATURATION: 99 % | TEMPERATURE: 98 F | SYSTOLIC BLOOD PRESSURE: 160 MMHG

## 2017-10-25 DIAGNOSIS — E87.6 HYPOKALEMIA: ICD-10-CM

## 2017-10-25 DIAGNOSIS — I10 HYPERTENSION GOAL BP (BLOOD PRESSURE) < 130/80: Chronic | ICD-10-CM

## 2017-10-25 DIAGNOSIS — E78.5 HYPERLIPIDEMIA LDL GOAL <70: Chronic | ICD-10-CM

## 2017-10-25 DIAGNOSIS — Z12.39 SCREENING FOR MALIGNANT NEOPLASM OF BREAST: ICD-10-CM

## 2017-10-25 DIAGNOSIS — I10 ESSENTIAL HYPERTENSION: ICD-10-CM

## 2017-10-25 PROCEDURE — 90686 IIV4 VACC NO PRSV 0.5 ML IM: CPT | Mod: PBBFAC | Performed by: PHYSICIAN ASSISTANT

## 2017-10-25 PROCEDURE — G0008 ADMIN INFLUENZA VIRUS VAC: HCPCS | Mod: PBBFAC

## 2017-10-25 PROCEDURE — 99999 PR PBB SHADOW E&M-EST. PATIENT-LVL IV: CPT | Mod: PBBFAC,,, | Performed by: PHYSICIAN ASSISTANT

## 2017-10-25 PROCEDURE — 99214 OFFICE O/P EST MOD 30 MIN: CPT | Mod: PBBFAC | Performed by: PHYSICIAN ASSISTANT

## 2017-10-25 PROCEDURE — 99214 OFFICE O/P EST MOD 30 MIN: CPT | Mod: S$PBB,,, | Performed by: PHYSICIAN ASSISTANT

## 2017-10-25 RX ORDER — METFORMIN HYDROCHLORIDE 1000 MG/1
1000 TABLET ORAL 2 TIMES DAILY WITH MEALS
Qty: 60 TABLET | Refills: 3 | Status: SHIPPED | OUTPATIENT
Start: 2017-10-25 | End: 2018-01-12 | Stop reason: SDUPTHER

## 2017-10-25 RX ORDER — LOSARTAN POTASSIUM AND HYDROCHLOROTHIAZIDE 25; 100 MG/1; MG/1
1 TABLET ORAL DAILY
Qty: 90 TABLET | Refills: 0 | Status: SHIPPED | OUTPATIENT
Start: 2017-10-25 | End: 2017-12-20 | Stop reason: SDUPTHER

## 2017-10-25 RX ORDER — PRAVASTATIN SODIUM 40 MG/1
40 TABLET ORAL DAILY
Qty: 30 TABLET | Refills: 3 | Status: SHIPPED | OUTPATIENT
Start: 2017-10-25 | End: 2018-01-12 | Stop reason: SDUPTHER

## 2017-10-25 RX ORDER — METOPROLOL SUCCINATE 25 MG/1
25 TABLET, EXTENDED RELEASE ORAL DAILY
Qty: 30 TABLET | Refills: 3 | Status: SHIPPED | OUTPATIENT
Start: 2017-10-25 | End: 2018-01-12 | Stop reason: SDUPTHER

## 2017-10-25 RX ORDER — AMLODIPINE BESYLATE 10 MG/1
10 TABLET ORAL DAILY
Qty: 30 TABLET | Refills: 3 | Status: SHIPPED | OUTPATIENT
Start: 2017-10-25 | End: 2018-01-12 | Stop reason: SDUPTHER

## 2017-10-25 RX ORDER — POTASSIUM CHLORIDE 750 MG/1
10 TABLET, EXTENDED RELEASE ORAL DAILY
Qty: 30 TABLET | Refills: 3 | Status: SHIPPED | OUTPATIENT
Start: 2017-10-25 | End: 2018-01-12 | Stop reason: SDUPTHER

## 2017-10-25 NOTE — PATIENT INSTRUCTIONS
Diabetic Type 2 Care Plan/Goals Individual Care Plan    1.) Patient was instructed to monitor blood glucose 2 - 3 x daily, fasting, and before dinner, 2 hours post meals,  or at bedtime. Discussed ADA goal for fasting blood sugar, 80 - 130 mg/dL; Post meal blood sugars below 180 mg/dl.   Also, discussed prevention of hypoglycemia and the need to adjust goals to higher levels if persistent hypoglycemia.  Reminded to bring BG records or meter to each visit for review.  Self management plan reviewed with the patient. declined    2.) Reviewed pathophysiology of diabetes, complications related to the disease, importance of annual dilated eye exam and daily foot examination.    3.) We discussed the ADA recommendations: Hemoglobin A1c below 7.0 %. All patients with diabetes should be on statins unless contraindicated.  ACE or ARB therapy if not contraindicated. Patient is at goal today (No).    Barriers (Yes) were discussed and addressed with patient. household issues  Reported issues with medication adherence or side effects (Yes) reviewed.      4.) Meal planning teaching: Carbohydrate definition - one serving is 15 gms. Carbohydrate spacing - carbohydrates should be spaced into approximately 3 meals with 2 snacks (of one carbohydrate) between meals or at bedtime. Increase vegetable intake to 2 or more cups of vegetables per day as well as 2 fruit servings. Recommended low saturated fat, low sodium diet to aid in control of hypertension and cholesterol.    5.) Discussed activity, benefits, methods, and precautions. Recommended patient start or continue some form of exercise and increase as tolerated to 30 minutes per day to facilitate weight loss and aid in control of Blood Sugars. Goal exercise is 150 min a week, using your preferred means of exercise Home exercise routine includes walking 1 hrs per days.    6.) Return to clinic in 3 months for follow up. Patient was explained the above plan and given opportunity to ask  questions. Advised to call clinic with any further questions or concerns.    Goal is to complete all pending diabetic maintenance requirements that were reviewed by provider at this visit.       Healthy Meals for Diabetes  Ask your healthcare team to help you make a meal plan that fits your needs. Your meal plan tells you when to eat your meals and snacks, what kinds of foods to eat, and how much of each food to eat. You dont have to give up all the foods you like. But you do need to follow some guidelines.  Choose healthy carbohydrates    Starches, sugars, and fiber are all types of carbohydrates. Fiber can help lower your cholesterol and triglycerides. Fiber is also healthy for your heart. You should have 20 to 35 grams of total fiber each day. Fiber-rich foods include:  · Whole-grain breads and cereals  · Bulgur wheat  · Brown rice     · Whole-wheat pasta  · Fruits and vegetables  · Dry beans, and peas   Its important to keep track of the amount of carbohydrates you eat. This can help you keep the right balance of physical activity and medicine. The amount of carbohydrates needed will vary for each person. It depends on many things such as your health, the medicines you take, and how active you are. Your healthcare team will help you figure out the right amount of carbohydrates for you. You may start with around 45 to 60 grams of carbohydrates per meal, depending on your situation.   Here are some examples of foods containing about 15 grams of carbohydrates (1 serving of carbohydrates):  · 1/2 cup of canned or frozen fruit  · A small piece of fresh fruit (4 ounces)  · 1 slice of bread  · 1/2 cup of oatmeal  · 1/3 cup of rice  · 4 to 6 crackers  · 1/2 English muffin  · 1/2 cup of black beans  · 1/4 of a large baked potato (3 ounces)  · 2/3 cup of plain fat-free yogurt  · 1 cup of soup  · 1/2 cup of casserole  · 6 chicken nuggets  · 2-inch square brownie or cake without frosting  · 2 small cookies  · 1/2 cup of  ice cream or sherbet  Choose healthy protein foods  Eating protein that is low in fat can help you control your weight. It also helps keep your heart healthy. Low-fat protein foods include:  · Fish  · Plant proteins, such as dry beans and peas, nuts, and soy products like tofu and soymilk  · Lean meat with all visible fat removed  · Poultry with the skin removed  · Low-fat or nonfat milk, cheese, and yogurt  Limit unhealthy fats and sugar  Saturated and trans fats are unhealthy for your heart. They raise LDL (bad) cholesterol. Fat is also high in calories, so it can make you gain weight. To cut down on unhealthy fats and sugar, limit these foods:  · Butter or margarine  · Palm and palm kernel oils and coconut oil  · Cream  · Cheese  · [place]  · Lunch meats     · Ice cream  · Sweet bakery goods such as pies, muffins, and donuts  · Jams and jellies  · Candy bars  · Regular sodas   How much to eat  The amount of food you eat affects your blood sugar. It also affects your weight. Your health care team will tell you how much of each type of food you should eat.  · Use measuring cups and spoons and a food scale to measure serving sizes.  · Learn what a correct serving size looks like on your plate. This will help when you are away from home and cant measure your servings.  · Eat only the number of servings given on your meal plan for each food. Dont take seconds.  · Learn to read food labels. Be sure to look at serving size, total carbohydrates, fiber, calories, sugar, and saturated and trans fats.  When to eat  Your meal plan will likely include breakfast, lunch, dinner, and some snacks.  · Try to eat your meals and snacks at about the same times each day.  · Eat all your meals and snacks. Skipping a meal or snack can make your blood sugar drop too low. It can also cause you to eat too much at the next meal or snack. Then your blood sugar could get too high.  © 6376-3540 [employer]. [address] [place] [address]  Road, [place] [address]. All rights reserved. This information is not intended as a substitute for professional medical care. Always follow your healthcare professional's instructions.

## 2017-10-25 NOTE — PROGRESS NOTES
Subjective:       Patient ID: Carisa Curry is a 62 y.o. female.    Chief Complaint: lab review and Medication Refill    Diabetes   She presents for her follow-up diabetic visit. She has type 2 diabetes mellitus. Her disease course has been improving. There are no hypoglycemic associated symptoms. Pertinent negatives for diabetes include no blurred vision, no chest pain, no fatigue, no foot paresthesias, no foot ulcerations, no polydipsia, no polyphagia, no polyuria, no visual change, no weakness and no weight loss. There are no hypoglycemic complications. Symptoms are stable. Pertinent negatives for diabetic complications include no autonomic neuropathy or peripheral neuropathy. Risk factors for coronary artery disease include diabetes mellitus, dyslipidemia, family history, hypertension, obesity, sedentary lifestyle and post-menopausal. Current diabetic treatment includes diet and oral agent (monotherapy). She is compliant with treatment all of the time. Her weight is stable. She is following a diabetic diet. When asked about meal planning, she reported none. She has not had a previous visit with a dietitian. She participates in exercise daily. Her home blood glucose trend is increasing steadily. Her breakfast blood glucose range is generally 130-140 mg/dl. An ACE inhibitor/angiotensin II receptor blocker is being taken. She does not see a podiatrist.Eye exam is current.     Past Medical History:   Diagnosis Date    Arthritis     CHF (congestive heart failure)     Diabetes mellitus, type 2     Diabetic retinopathy     Hyperlipidemia     Hypertension        Past Surgical History:   Procedure Laterality Date    EYE SURGERY      SPINE SURGERY         Family History   Problem Relation Age of Onset    Kidney disease Mother     Heart failure Mother     Cataracts Mother     Hypertension Mother     Cancer Father     Diabetes Father     Diabetes Maternal Aunt     Hypertension Maternal Aunt      "Diabetes Paternal Aunt     Hypertension Paternal Aunt     Diabetes Paternal Uncle        Social History     Social History    Marital status: Single     Spouse name: N/A    Number of children: N/A    Years of education: N/A     Occupational History    Not on file.     Social History Main Topics    Smoking status: Never Smoker    Smokeless tobacco: Never Used    Alcohol use No    Drug use: No    Sexual activity: No     Other Topics Concern    Not on file     Social History Narrative    No narrative on file       Review of patient's allergies indicates:  No Known Allergies      Current Outpatient Prescriptions:     amLODIPine (NORVASC) 10 MG tablet, Take 1 tablet (10 mg total) by mouth once daily., Disp: 30 tablet, Rfl: 3    losartan-hydrochlorothiazide 100-25 mg (HYZAAR) 100-25 mg per tablet, Take 1 tablet by mouth once daily., Disp: 90 tablet, Rfl: 0    metFORMIN (GLUCOPHAGE) 1000 MG tablet, Take 1 tablet (1,000 mg total) by mouth 2 (two) times daily with meals., Disp: 60 tablet, Rfl: 3    potassium chloride (KLOR-CON) 10 MEQ TbSR, Take 1 tablet (10 mEq total) by mouth once daily., Disp: 30 tablet, Rfl: 3    pravastatin (PRAVACHOL) 40 MG tablet, Take 1 tablet (40 mg total) by mouth once daily., Disp: 30 tablet, Rfl: 3    blood sugar diagnostic Strp, Twice daily glucose testing., Disp: 60 strip, Rfl: 6    blood-glucose meter kit, Use as instructed, Disp: 1 each, Rfl: 0    dulaglutide 1.5 mg/0.5 mL PnIj, Inject 1.5 mg into the skin every 7 days., Disp: 0.5 mL, Rfl: 3    insulin needles, disposable, (PEN NEEDLE) 31 X 5/16 " Ndle, Once a day insulin injection., Disp: 30 each, Rfl: 6    lancets Misc, Twice daily glucose monitoring., Disp: 60 each, Rfl: 6    metoprolol succinate (TOPROL-XL) 25 MG 24 hr tablet, Take 1 tablet (25 mg total) by mouth once daily., Disp: 30 tablet, Rfl: 3    BP (!) 160/94 (BP Location: Right arm, Patient Position: Sitting, BP Method: Large (Manual))   Pulse 84   Temp " "97.9 °F (36.6 °C) (Tympanic)   Ht 5' 6" (1.676 m)   Wt 118.1 kg (260 lb 5.8 oz)   SpO2 99%   BMI 42.02 kg/m²   Review of Systems   Constitutional: Negative for chills, fatigue, fever and weight loss.   Eyes: Negative for blurred vision.   Respiratory: Negative for chest tightness and shortness of breath.    Cardiovascular: Negative for chest pain.   Gastrointestinal: Negative for abdominal pain.   Endocrine: Negative for polydipsia, polyphagia and polyuria.   Genitourinary: Negative.    Neurological: Negative for weakness.       Objective:      Physical Exam   Constitutional: She appears well-developed and well-nourished. No distress.   Cardiovascular: Normal rate, regular rhythm, normal heart sounds and intact distal pulses.  Exam reveals no gallop and no friction rub.    No murmur heard.  Pulmonary/Chest: Effort normal and breath sounds normal. No respiratory distress. She has no wheezes. She has no rales. She exhibits no tenderness.   Abdominal: Soft. Bowel sounds are normal. She exhibits no distension and no mass. There is no tenderness. There is no rebound and no guarding. No hernia.   Skin: She is not diaphoretic.   Nursing note and vitals reviewed.      Assessment:       1. Hypertension goal BP (blood pressure) < 130/80    2. Hyperlipidemia LDL goal <70    3. Hypokalemia    4. Uncontrolled type 2 diabetes mellitus with microalbuminuria, with long-term current use of insulin    5. Essential hypertension    6. Screening for malignant neoplasm of breast        Plan:       Hypertension goal BP (blood pressure) < 130/80  -     amLODIPine (NORVASC) 10 MG tablet; Take 1 tablet (10 mg total) by mouth once daily.  Dispense: 30 tablet; Refill: 3    Hyperlipidemia LDL goal <70  -     pravastatin (PRAVACHOL) 40 MG tablet; Take 1 tablet (40 mg total) by mouth once daily.  Dispense: 30 tablet; Refill: 3    Hypokalemia  -     potassium chloride (KLOR-CON) 10 MEQ TbSR; Take 1 tablet (10 mEq total) by mouth once daily.  " Dispense: 30 tablet; Refill: 3    Uncontrolled type 2 diabetes mellitus with microalbuminuria, with long-term current use of insulin  -     metFORMIN (GLUCOPHAGE) 1000 MG tablet; Take 1 tablet (1,000 mg total) by mouth 2 (two) times daily with meals.  Dispense: 60 tablet; Refill: 3    Essential hypertension  -     losartan-hydrochlorothiazide 100-25 mg (HYZAAR) 100-25 mg per tablet; Take 1 tablet by mouth once daily.  Dispense: 90 tablet; Refill: 0    Screening for malignant neoplasm of breast  -     Mammo Digital Screening Bilateral With CAD; Future; Expected date: 10/25/2017    Other orders  -     metoprolol succinate (TOPROL-XL) 25 MG 24 hr tablet; Take 1 tablet (25 mg total) by mouth once daily.  Dispense: 30 tablet; Refill: 3  -     dulaglutide 1.5 mg/0.5 mL PnIj; Inject 1.5 mg into the skin every 7 days.  Dispense: 0.5 mL; Refill: 3       Flu shot and shingle shot given.

## 2017-12-19 DIAGNOSIS — I10 HYPERTENSION GOAL BP (BLOOD PRESSURE) < 130/80: ICD-10-CM

## 2017-12-19 DIAGNOSIS — I10 ESSENTIAL HYPERTENSION: ICD-10-CM

## 2017-12-20 RX ORDER — LOSARTAN POTASSIUM AND HYDROCHLOROTHIAZIDE 12.5; 1 MG/1; MG/1
TABLET ORAL
Qty: 30 TABLET | Refills: 2 | OUTPATIENT
Start: 2017-12-20

## 2017-12-20 RX ORDER — LOSARTAN POTASSIUM AND HYDROCHLOROTHIAZIDE 25; 100 MG/1; MG/1
1 TABLET ORAL DAILY
Qty: 90 TABLET | Refills: 1 | Status: SHIPPED | OUTPATIENT
Start: 2017-12-20 | End: 2018-01-12 | Stop reason: SDUPTHER

## 2017-12-26 ENCOUNTER — TELEPHONE (OUTPATIENT)
Dept: INTERNAL MEDICINE | Facility: CLINIC | Age: 62
End: 2017-12-26

## 2017-12-26 NOTE — TELEPHONE ENCOUNTER
----- Message from Mery Leeanna sent at 12/26/2017  4:11 PM CST -----  Pt sister(Mery) at [phone]//states pt is needing paperwork filled out before 1-14-17 and need her meds refilled//no appts available//please call//thanks/claudio

## 2017-12-26 NOTE — TELEPHONE ENCOUNTER
Called and spoke with pt's sister. She stated that she wants dr. Guzman to fill some paperwork out for the pt. She also stated that she wants to have it done before 1/14/18. I stated you can drop it off tomorrow, and it will be 7-14 business days before she fills out the paperwork. Pt's sister states she will drop it off tomorrow

## 2018-01-08 ENCOUNTER — TELEPHONE (OUTPATIENT)
Dept: INTERNAL MEDICINE | Facility: CLINIC | Age: 63
End: 2018-01-08

## 2018-01-08 NOTE — TELEPHONE ENCOUNTER
----- Message from Genna Jefferson sent at 1/8/2018 10:17 AM CST -----  Contact: anjali-sister  Would like to know if paperwork is ready. Please call back at [phone].      Thanks,  Genna Jefferson

## 2018-01-08 NOTE — TELEPHONE ENCOUNTER
Called pt did not answer. Phone did busy signal.    Pt called back. I asked pt what paperwork is she referring to and when did she bring it. Pt stated it is paperwork about getting help from the state and she brought it in 2 weeks ago and wanted to see if it was ready. I stated I will have to ask dr. Guzman about the paperwork and if it is completed we will give you a call back to come pick it. She stated she needs it by Wednesday.

## 2018-01-09 ENCOUNTER — TELEPHONE (OUTPATIENT)
Dept: INTERNAL MEDICINE | Facility: CLINIC | Age: 63
End: 2018-01-09

## 2018-01-09 NOTE — TELEPHONE ENCOUNTER
Called and spoke with pt. Informed pt that dr. Guzman stated that she will try to get to the paperwork, but she can't make any promises because she just came back from vacation.

## 2018-01-09 NOTE — TELEPHONE ENCOUNTER
----- Message from Brooklyn Youssef sent at 1/9/2018 10:17 AM CST -----  Contact: Gros-[phone]   Pt would like to check the status on paper work.  Please call back at [phone]. Thx-AH

## 2018-01-10 ENCOUNTER — TELEPHONE (OUTPATIENT)
Dept: INTERNAL MEDICINE | Facility: CLINIC | Age: 63
End: 2018-01-10

## 2018-01-10 NOTE — TELEPHONE ENCOUNTER
Called back and spoke with pt. Asked pt does she have the old paperwork and if so can she fax it. Pt stated she does not have the old paperwork and it was from 1-2 years and we don't have copies of it. I stated no ma'am we do not I like in pt chart and I asked can you call the state and see if they can fax it. Pt stated no the state will not give out the old paperwork or fax because they want the current info. I stated dr. Guzman wanted to go off the old paperwork. She said I remember what she said she basically said the the pt needs help. I stated dr. Guzman is not going to go off of the verbal info. I stated I will give you a call back on what she says.

## 2018-01-10 NOTE — TELEPHONE ENCOUNTER
Called and spoke with pt. I asked about the paperwork that she needs filled out for pt. I asked did she have a completed copy and if not what does she need the service for. She stated it was about a year ago when dr. Guzman filled out the paperwork. She also stated that she needs the service for to someone to give her shots,a bath, and she needs some one to come seven days a week for 32 hours. I stated I will let dr Jennifer Guzman know and call you back.

## 2018-01-10 NOTE — TELEPHONE ENCOUNTER
----- Message from Mery Durán sent at 1/10/2018  9:14 AM CST -----  Pt at [phone]//states is calling to check on the status of her paperwork that is needing to be filled out//please call//vanessa/claudio

## 2018-01-10 NOTE — TELEPHONE ENCOUNTER
----- Message from Mirta Ling sent at 1/10/2018  2:09 PM CST -----  Contact: Pt  Pt calling in regards to wanting to know if the paperwork she requested was left at the  at the [facility] location. Pt can be reached at .[phone] (bkpc)

## 2018-01-10 NOTE — TELEPHONE ENCOUNTER
----- Message from Erica Rebolledo sent at 1/10/2018  2:13 PM CST -----  Contact: pt  Pt returning nurse call, please call pt @ [phone].

## 2018-01-11 ENCOUNTER — TELEPHONE (OUTPATIENT)
Dept: INTERNAL MEDICINE | Facility: CLINIC | Age: 63
End: 2018-01-11

## 2018-01-11 NOTE — TELEPHONE ENCOUNTER
Called and spoke with pt. I stated that dr. Guzman said that you will need to come in for an appt to get the paperwork complete. Pt stated someone told her that there was no appt until February. I stated I can get you an appt  Tomorrow. Scheduled pt per request on 1/12/18 at 2:20pm.

## 2018-01-11 NOTE — TELEPHONE ENCOUNTER
----- Message from Aliya Gilliam sent at 1/11/2018  7:35 AM CST -----  Contact: Pt  Pt called and stated she needed to speak to the nurse. She stated that she is checking the status of paperwork she needs to  from the office. She can be reached at [phone] (home).    Thanks,  TF

## 2018-01-12 ENCOUNTER — OFFICE VISIT (OUTPATIENT)
Dept: INTERNAL MEDICINE | Facility: CLINIC | Age: 63
End: 2018-01-12
Payer: MEDICARE

## 2018-01-12 VITALS
HEIGHT: 66 IN | SYSTOLIC BLOOD PRESSURE: 152 MMHG | OXYGEN SATURATION: 95 % | WEIGHT: 268.31 LBS | DIASTOLIC BLOOD PRESSURE: 90 MMHG | HEART RATE: 80 BPM | TEMPERATURE: 98 F | BODY MASS INDEX: 43.12 KG/M2

## 2018-01-12 DIAGNOSIS — F20.9 SCHIZOPHRENIA, UNSPECIFIED TYPE: ICD-10-CM

## 2018-01-12 DIAGNOSIS — E78.5 HYPERLIPIDEMIA LDL GOAL <70: Chronic | ICD-10-CM

## 2018-01-12 DIAGNOSIS — I10 HYPERTENSION GOAL BP (BLOOD PRESSURE) < 130/80: Primary | Chronic | ICD-10-CM

## 2018-01-12 DIAGNOSIS — T50.2X5A DIURETIC-INDUCED HYPOKALEMIA: ICD-10-CM

## 2018-01-12 DIAGNOSIS — E87.6 DIURETIC-INDUCED HYPOKALEMIA: ICD-10-CM

## 2018-01-12 DIAGNOSIS — E66.01 MORBID OBESITY WITH BMI OF 40.0-44.9, ADULT: ICD-10-CM

## 2018-01-12 PROCEDURE — 99214 OFFICE O/P EST MOD 30 MIN: CPT | Mod: S$PBB,,, | Performed by: INTERNAL MEDICINE

## 2018-01-12 PROCEDURE — 99213 OFFICE O/P EST LOW 20 MIN: CPT | Mod: PBBFAC | Performed by: INTERNAL MEDICINE

## 2018-01-12 PROCEDURE — 99999 PR PBB SHADOW E&M-EST. PATIENT-LVL III: CPT | Mod: PBBFAC,,, | Performed by: INTERNAL MEDICINE

## 2018-01-12 RX ORDER — LOSARTAN POTASSIUM AND HYDROCHLOROTHIAZIDE 25; 100 MG/1; MG/1
1 TABLET ORAL DAILY
Qty: 90 TABLET | Refills: 1 | Status: SHIPPED | OUTPATIENT
Start: 2018-01-12 | End: 2018-10-31 | Stop reason: SDUPTHER

## 2018-01-12 RX ORDER — POTASSIUM CHLORIDE 750 MG/1
10 TABLET, EXTENDED RELEASE ORAL DAILY
Qty: 90 TABLET | Refills: 1 | Status: SHIPPED | OUTPATIENT
Start: 2018-01-12 | End: 2018-10-31 | Stop reason: SDUPTHER

## 2018-01-12 RX ORDER — METOPROLOL SUCCINATE 25 MG/1
25 TABLET, EXTENDED RELEASE ORAL DAILY
Qty: 90 TABLET | Refills: 1 | Status: SHIPPED | OUTPATIENT
Start: 2018-01-12 | End: 2018-10-31 | Stop reason: SDUPTHER

## 2018-01-12 RX ORDER — METFORMIN HYDROCHLORIDE 1000 MG/1
1000 TABLET ORAL 2 TIMES DAILY WITH MEALS
Qty: 180 TABLET | Refills: 1 | Status: SHIPPED | OUTPATIENT
Start: 2018-01-12 | End: 2018-10-31 | Stop reason: SDUPTHER

## 2018-01-12 RX ORDER — AMLODIPINE BESYLATE 10 MG/1
10 TABLET ORAL DAILY
Qty: 30 TABLET | Refills: 3 | Status: SHIPPED | OUTPATIENT
Start: 2018-01-12 | End: 2018-10-31 | Stop reason: SDUPTHER

## 2018-01-12 RX ORDER — PRAVASTATIN SODIUM 40 MG/1
40 TABLET ORAL DAILY
Qty: 90 TABLET | Refills: 1 | Status: SHIPPED | OUTPATIENT
Start: 2018-01-12 | End: 2018-10-31 | Stop reason: SDUPTHER

## 2018-01-12 NOTE — PROGRESS NOTES
"Carisa Curry  62 y.o. Black or  female    Chief Complaint   Patient presents with    Follow-up     paperwork     HPI: Presents to the clinic to have paperwork completed for assistance in the home. She is here with one sister and her niece. I spoke to another sister on the phone and she states she has been having someone come out to assist her due to her having schizophrenia and multiple co-morbidities. She is unable to prepare her food, toilet, dress or bath independently. She has behavioral issues but she is in denial about her diagnosis of schizophrenia. Her sister states she talks to people that aren't there. She needs to see psychiatry but is reluctant to go. She is not on any medication for schizophrenia.   HTN--uncontrolled. She is out of one of her b/p medications, but not sure which one. She would like refills on all of them.   Diabetes--uncontrolled. She is compliant with Trulicity. She requires someone give her the injection once per week. She is also taking metformin.   Lab Results   Component Value Date    HGBA1C 8.4 (H) 10/18/2017   Microalbuminuria--she is compliant with losartan.   HLD--compliant with pravastatin.     Past Medical History:   Diagnosis Date    Arthritis     CHF (congestive heart failure)     Diabetes mellitus, type 2     Diabetic retinopathy     Hyperlipidemia     Hypertension     Schizophrenia        Current Outpatient Prescriptions:     amLODIPine (NORVASC) 10 MG tablet, Take 1 tablet (10 mg total) by mouth once daily., Disp: 30 tablet, Rfl: 3    blood sugar diagnostic Strp, Twice daily glucose testing., Disp: 60 strip, Rfl: 6    dulaglutide 1.5 mg/0.5 mL PnIj, Inject 1.5 mg into the skin every 7 days., Disp: 12 mL, Rfl: 1    insulin needles, disposable, (PEN NEEDLE) 31 X 5/16 " Ndle, Once a day insulin injection., Disp: 30 each, Rfl: 6    lancets Misc, Twice daily glucose monitoring., Disp: 60 each, Rfl: 6    losartan-hydrochlorothiazide 100-25 " mg (HYZAAR) 100-25 mg per tablet, Take 1 tablet by mouth once daily., Disp: 90 tablet, Rfl: 1    metFORMIN (GLUCOPHAGE) 1000 MG tablet, Take 1 tablet (1,000 mg total) by mouth 2 (two) times daily with meals., Disp: 180 tablet, Rfl: 1    metoprolol succinate (TOPROL-XL) 25 MG 24 hr tablet, Take 1 tablet (25 mg total) by mouth once daily., Disp: 90 tablet, Rfl: 1    potassium chloride (KLOR-CON) 10 MEQ TbSR, Take 1 tablet (10 mEq total) by mouth once daily., Disp: 90 tablet, Rfl: 1    pravastatin (PRAVACHOL) 40 MG tablet, Take 1 tablet (40 mg total) by mouth once daily., Disp: 90 tablet, Rfl: 1    blood-glucose meter kit, Use as instructed, Disp: 1 each, Rfl: 0      Allergies:  Review of patient's allergies indicates:  No Known Allergies    ROS:  Denies headache, dizziness, chest pain or shortness of breath    PHYSICAL EXAM:  VITAL SIGNS: Reviewed  GENERAL: Alert and oriented, no acute distress  HEART: Regular rate   LUNGS: Respirations unlabored  PSYCH: Mood and affect appropriate     ASSESSMENT/PLAN:  Carisa was seen today for follow-up.    Diagnoses and all orders for this visit:    Hypertension goal BP (blood pressure) < 130/80  -     amLODIPine (NORVASC) 10 MG tablet; Take 1 tablet (10 mg total) by mouth once daily.  -     losartan-hydrochlorothiazide 100-25 mg (HYZAAR) 100-25 mg per tablet; Take 1 tablet by mouth once daily.  -     metoprolol succinate (TOPROL-XL) 25 MG 24 hr tablet; Take 1 tablet (25 mg total) by mouth once daily.    Diuretic-induced hypokalemia  -     potassium chloride (KLOR-CON) 10 MEQ TbSR; Take 1 tablet (10 mEq total) by mouth once daily.    Uncontrolled type 2 diabetes mellitus with microalbuminuria, with long-term current use of insulin  -     metFORMIN (GLUCOPHAGE) 1000 MG tablet; Take 1 tablet (1,000 mg total) by mouth 2 (two) times daily with meals.  -     dulaglutide 1.5 mg/0.5 mL PnIj; Inject 1.5 mg into the skin every 7 days.    Hyperlipidemia LDL goal <70  -     pravastatin  (PRAVACHOL) 40 MG tablet; Take 1 tablet (40 mg total) by mouth once daily.    Schizophrenia, unspecified type  -     Recommend psychiatry evaluation    Morbid obesity with BMI of 40.0-44.9, adult  -     Lifestyle modifications discussed    Paperwork completed.     RTC in 2 weeks for b/p recheck.

## 2018-02-09 ENCOUNTER — PATIENT OUTREACH (OUTPATIENT)
Dept: ADMINISTRATIVE | Facility: HOSPITAL | Age: 63
End: 2018-02-09

## 2018-02-09 NOTE — PROGRESS NOTES
I have attempted without success to contact this patient by phone to schedule annual mammogram exam. Patient not available, no answer.

## 2018-04-06 ENCOUNTER — PATIENT OUTREACH (OUTPATIENT)
Dept: ADMINISTRATIVE | Facility: HOSPITAL | Age: 63
End: 2018-04-06

## 2018-04-06 DIAGNOSIS — E11.9 DIABETES MELLITUS WITHOUT COMPLICATION: ICD-10-CM

## 2018-04-06 NOTE — PROGRESS NOTES
I have attempted without success to contact this patient by phone to reschedule annual mammogram exam and other health maintenance. Patient not available, left voicemail.

## 2018-04-30 DIAGNOSIS — E11.9 TYPE 2 DIABETES MELLITUS WITHOUT COMPLICATION: ICD-10-CM

## 2018-05-03 ENCOUNTER — PATIENT OUTREACH (OUTPATIENT)
Dept: ADMINISTRATIVE | Facility: HOSPITAL | Age: 63
End: 2018-05-03

## 2018-05-03 NOTE — PROGRESS NOTES
I have attempted without success to contact this patient to schedule an appointment for blood pressure recheck and other health maintenance. Patient not available, number invalid.

## 2018-08-10 ENCOUNTER — PATIENT OUTREACH (OUTPATIENT)
Dept: ADMINISTRATIVE | Facility: HOSPITAL | Age: 63
End: 2018-08-10

## 2018-08-10 NOTE — PROGRESS NOTES
I have attempted without success to contact this patient by phone to schedule annual mammogram exam and other health maintenance. Patient not available, unable to leave message.

## 2018-09-25 DIAGNOSIS — T50.2X5A DIURETIC-INDUCED HYPOKALEMIA: ICD-10-CM

## 2018-09-25 DIAGNOSIS — E78.5 HYPERLIPIDEMIA LDL GOAL <70: Chronic | ICD-10-CM

## 2018-09-25 DIAGNOSIS — I10 HYPERTENSION GOAL BP (BLOOD PRESSURE) < 130/80: Chronic | ICD-10-CM

## 2018-09-25 DIAGNOSIS — E87.6 DIURETIC-INDUCED HYPOKALEMIA: ICD-10-CM

## 2018-09-26 RX ORDER — METOPROLOL SUCCINATE 25 MG/1
TABLET, EXTENDED RELEASE ORAL
Qty: 90 TABLET | Refills: 0 | OUTPATIENT
Start: 2018-09-26

## 2018-09-26 RX ORDER — PRAVASTATIN SODIUM 40 MG/1
TABLET ORAL
Qty: 90 TABLET | Refills: 0 | OUTPATIENT
Start: 2018-09-26

## 2018-09-26 RX ORDER — METFORMIN HYDROCHLORIDE 1000 MG/1
TABLET ORAL
Qty: 180 TABLET | Refills: 0 | OUTPATIENT
Start: 2018-09-26

## 2018-09-26 RX ORDER — POTASSIUM CHLORIDE 750 MG/1
TABLET, EXTENDED RELEASE ORAL
Qty: 90 TABLET | Refills: 0 | OUTPATIENT
Start: 2018-09-26

## 2018-09-26 RX ORDER — LOSARTAN POTASSIUM AND HYDROCHLOROTHIAZIDE 25; 100 MG/1; MG/1
TABLET ORAL
Qty: 90 TABLET | Refills: 0 | OUTPATIENT
Start: 2018-09-26

## 2018-10-12 DIAGNOSIS — E11.9 TYPE 2 DIABETES MELLITUS WITHOUT COMPLICATION: ICD-10-CM

## 2018-10-25 ENCOUNTER — PATIENT OUTREACH (OUTPATIENT)
Dept: ADMINISTRATIVE | Facility: HOSPITAL | Age: 63
End: 2018-10-25

## 2018-10-25 NOTE — PROGRESS NOTES
Contacted patient to schedule follow up appointment for Diabetes. Patient has an appointment scheduled on 10/31/18 with Dr Guzman.

## 2018-10-31 ENCOUNTER — LAB VISIT (OUTPATIENT)
Dept: LAB | Facility: HOSPITAL | Age: 63
End: 2018-10-31
Attending: INTERNAL MEDICINE
Payer: MEDICARE

## 2018-10-31 ENCOUNTER — OFFICE VISIT (OUTPATIENT)
Dept: INTERNAL MEDICINE | Facility: CLINIC | Age: 63
End: 2018-10-31
Payer: MEDICARE

## 2018-10-31 VITALS
DIASTOLIC BLOOD PRESSURE: 78 MMHG | BODY MASS INDEX: 41.1 KG/M2 | HEART RATE: 92 BPM | SYSTOLIC BLOOD PRESSURE: 158 MMHG | HEIGHT: 66 IN | WEIGHT: 255.75 LBS | TEMPERATURE: 98 F | OXYGEN SATURATION: 98 %

## 2018-10-31 DIAGNOSIS — Z23 IMMUNIZATION DUE: ICD-10-CM

## 2018-10-31 DIAGNOSIS — E11.9 TYPE 2 DIABETES MELLITUS WITHOUT COMPLICATION: ICD-10-CM

## 2018-10-31 DIAGNOSIS — E66.01 MORBID OBESITY WITH BMI OF 40.0-44.9, ADULT: ICD-10-CM

## 2018-10-31 DIAGNOSIS — T50.2X5A DIURETIC-INDUCED HYPOKALEMIA: ICD-10-CM

## 2018-10-31 DIAGNOSIS — I10 HYPERTENSION GOAL BP (BLOOD PRESSURE) < 130/80: Primary | ICD-10-CM

## 2018-10-31 DIAGNOSIS — E87.6 DIURETIC-INDUCED HYPOKALEMIA: ICD-10-CM

## 2018-10-31 DIAGNOSIS — E78.5 HYPERLIPIDEMIA LDL GOAL <70: ICD-10-CM

## 2018-10-31 LAB
ALBUMIN/CREAT UR: 16.3 UG/MG
CREAT UR-MCNC: 398 MG/DL
MICROALBUMIN UR DL<=1MG/L-MCNC: 65 UG/ML

## 2018-10-31 PROCEDURE — 90686 IIV4 VACC NO PRSV 0.5 ML IM: CPT | Mod: PBBFAC

## 2018-10-31 PROCEDURE — 99999 PR PBB SHADOW E&M-EST. PATIENT-LVL III: CPT | Mod: PBBFAC,,, | Performed by: INTERNAL MEDICINE

## 2018-10-31 PROCEDURE — 82043 UR ALBUMIN QUANTITATIVE: CPT

## 2018-10-31 PROCEDURE — 99214 OFFICE O/P EST MOD 30 MIN: CPT | Mod: S$PBB,,, | Performed by: INTERNAL MEDICINE

## 2018-10-31 PROCEDURE — 99213 OFFICE O/P EST LOW 20 MIN: CPT | Mod: PBBFAC,25 | Performed by: INTERNAL MEDICINE

## 2018-10-31 RX ORDER — POTASSIUM CHLORIDE 750 MG/1
10 TABLET, EXTENDED RELEASE ORAL DAILY
Qty: 90 TABLET | Refills: 1 | Status: SHIPPED | OUTPATIENT
Start: 2018-10-31 | End: 2019-02-11 | Stop reason: SDUPTHER

## 2018-10-31 RX ORDER — PRAVASTATIN SODIUM 40 MG/1
40 TABLET ORAL DAILY
Qty: 90 TABLET | Refills: 1 | Status: SHIPPED | OUTPATIENT
Start: 2018-10-31 | End: 2019-02-11 | Stop reason: SDUPTHER

## 2018-10-31 RX ORDER — LOSARTAN POTASSIUM AND HYDROCHLOROTHIAZIDE 25; 100 MG/1; MG/1
1 TABLET ORAL DAILY
Qty: 90 TABLET | Refills: 1 | Status: SHIPPED | OUTPATIENT
Start: 2018-10-31 | End: 2019-02-11 | Stop reason: SDUPTHER

## 2018-10-31 RX ORDER — METOPROLOL SUCCINATE 25 MG/1
25 TABLET, EXTENDED RELEASE ORAL DAILY
Qty: 90 TABLET | Refills: 1 | Status: SHIPPED | OUTPATIENT
Start: 2018-10-31 | End: 2019-02-11 | Stop reason: SDUPTHER

## 2018-10-31 RX ORDER — AMLODIPINE BESYLATE 10 MG/1
10 TABLET ORAL DAILY
Qty: 90 TABLET | Refills: 1 | Status: SHIPPED | OUTPATIENT
Start: 2018-10-31 | End: 2019-09-04 | Stop reason: SDUPTHER

## 2018-10-31 RX ORDER — METFORMIN HYDROCHLORIDE 1000 MG/1
1000 TABLET ORAL 2 TIMES DAILY WITH MEALS
Qty: 180 TABLET | Refills: 1 | Status: SHIPPED | OUTPATIENT
Start: 2018-10-31 | End: 2019-02-11 | Stop reason: SDUPTHER

## 2018-10-31 NOTE — PROGRESS NOTES
"Subjective:       Patient ID: Carisa Curry is a 63 y.o. female.    Chief Complaint: Follow-up    Carisa Curry  63 y.o. Black or  female    Patient presents with:  Follow-up    HPI: Here today to follow up on chronic conditions. She is here with her sister and care provider.   HTN--uncontrolled. She is out of her medications and needs refills. She was taking losartan-HCTZ, amlodipine and metoprolol. She takes potassium due to being on HCTZ.   Diabetes--she states her readings have improved. She has been compliant with metformin and Trulicity.                    HGBA1C                   8.4 (H)             10/18/2017       Microalbuminuria--compliant with losartan, but she is currently out.        HLD--compliant with pravastatin.                      CHOL                     156                 10/18/2017                   HDL                      52                  10/18/2017                 LDLCALC                  89.2                10/18/2017                 TRIG                     74                  10/18/2017                 Past Medical History:  Arthritis  CHF (congestive heart failure)  Diabetes mellitus, type 2  Diabetic retinopathy  Hyperlipidemia  Hypertension  Schizophrenia    Current Outpatient Medications:     amLODIPine (NORVASC) 10 MG tablet, Take 1 tablet (10 mg total) by mouth once daily., Disp: 90 tablet, Rfl: 1    blood sugar diagnostic Strp, Twice daily glucose testing., Disp: 60 strip, Rfl: 6    dulaglutide (TRULICITY) 1.5 mg/0.5 mL PnIj, Inject 1.5 mg into the skin every 7 days., Disp: 12 mL, Rfl: 1    insulin needles, disposable, (PEN NEEDLE) 31 X 5/16 " Ndle, Once a day insulin injection., Disp: 30 each, Rfl: 6    lancets Misc, Twice daily glucose monitoring., Disp: 60 each, Rfl: 6    losartan-hydrochlorothiazide 100-25 mg (HYZAAR) 100-25 mg per tablet, Take 1 tablet by mouth once daily., Disp: 90 tablet, Rfl: 1    metFORMIN (GLUCOPHAGE) 1000 MG " tablet, Take 1 tablet (1,000 mg total) by mouth 2 (two) times daily with meals., Disp: 180 tablet, Rfl: 1    metoprolol succinate (TOPROL-XL) 25 MG 24 hr tablet, Take 1 tablet (25 mg total) by mouth once daily., Disp: 90 tablet, Rfl: 1    potassium chloride (KLOR-CON) 10 MEQ TbSR, Take 1 tablet (10 mEq total) by mouth once daily., Disp: 90 tablet, Rfl: 1    pravastatin (PRAVACHOL) 40 MG tablet, Take 1 tablet (40 mg total) by mouth once daily., Disp: 90 tablet, Rfl: 1    blood-glucose meter kit, Use as instructed, Disp: 1 each, Rfl: 0    Allergies:  Review of patient's allergies indicates:  No Known Allergies          Review of Systems   Constitutional: Negative for fever and unexpected weight change.   Respiratory: Negative for cough and shortness of breath.    Cardiovascular: Negative for chest pain and leg swelling.   Gastrointestinal: Negative for abdominal pain.   Genitourinary: Negative for difficulty urinating.   Musculoskeletal: Positive for gait problem.   Neurological: Negative for dizziness and headaches.       Objective:      Physical Exam   Constitutional: She is oriented to person, place, and time. She appears well-developed and well-nourished. No distress.   Eyes: No scleral icterus.   Neck: No tracheal deviation present.   Cardiovascular: Normal rate, regular rhythm and normal heart sounds.   Pulses:       Dorsalis pedis pulses are 2+ on the right side, and 2+ on the left side.   Pulmonary/Chest: Effort normal and breath sounds normal. No respiratory distress.   Abdominal: Soft. Bowel sounds are normal.   Musculoskeletal: She exhibits no edema.   Feet:   Right Foot:   Protective Sensation: 4 sites tested. 4 sites sensed.   Skin Integrity: Positive for callus. Negative for ulcer.   Left Foot:   Protective Sensation: 4 sites tested. 4 sites sensed.   Skin Integrity: Positive for callus. Negative for ulcer.   Neurological: She is alert and oriented to person, place, and time.   Skin: Skin is warm and  dry.   Psychiatric: She has a normal mood and affect.   Vitals reviewed.      Assessment:       1. Hypertension goal BP (blood pressure) < 130/80    2. Diuretic-induced hypokalemia    3. Uncontrolled type 2 diabetes mellitus with microalbuminuria, without long-term current use of insulin    4. Hyperlipidemia LDL goal <70    5. Immunization due    6. Morbid obesity with BMI of 40.0-44.9, adult        Plan:       Carisa was seen today for follow-up.    Diagnoses and all orders for this visit:    Hypertension goal BP (blood pressure) < 130/80  -     Comprehensive metabolic panel; Standing  -     Lipid panel; Standing  -     amLODIPine (NORVASC) 10 MG tablet; Take 1 tablet (10 mg total) by mouth once daily.  -     losartan-hydrochlorothiazide 100-25 mg (HYZAAR) 100-25 mg per tablet; Take 1 tablet by mouth once daily.  -     metoprolol succinate (TOPROL-XL) 25 MG 24 hr tablet; Take 1 tablet (25 mg total) by mouth once daily.    Diuretic-induced hypokalemia  -     potassium chloride (KLOR-CON) 10 MEQ TbSR; Take 1 tablet (10 mEq total) by mouth once daily.    Uncontrolled type 2 diabetes mellitus with microalbuminuria, without long-term current use of insulin  -     Comprehensive metabolic panel; Standing  -     Hemoglobin A1c; Standing  -     Lipid panel; Standing  -     dulaglutide (TRULICITY) 1.5 mg/0.5 mL PnIj; Inject 1.5 mg into the skin every 7 days.  -     metFORMIN (GLUCOPHAGE) 1000 MG tablet; Take 1 tablet (1,000 mg total) by mouth 2 (two) times daily with meals.  -     Recommend annual diabetic eye exam     Hyperlipidemia LDL goal <70  -     Comprehensive metabolic panel; Standing  -     Lipid panel; Standing  -     pravastatin (PRAVACHOL) 40 MG tablet; Take 1 tablet (40 mg total) by mouth once daily.    Immunization due  -     Influenza - Quadrivalent (3 years & older) (PF)    Morbid obesity with BMI of 40.0-44.9, adult  -     Lifestyle modifications discussed    RTC in 2 weeks for b/p recheck.

## 2018-11-05 ENCOUNTER — TELEPHONE (OUTPATIENT)
Dept: INTERNAL MEDICINE | Facility: CLINIC | Age: 63
End: 2018-11-05

## 2018-11-05 NOTE — TELEPHONE ENCOUNTER
----- Message from Guadalupe Guzman DO sent at 11/2/2018  1:13 PM CDT -----  Notify patient no significant amount of protein seen in urine, indicating no diabetes-related kidney damage.

## 2018-11-06 ENCOUNTER — TELEPHONE (OUTPATIENT)
Dept: INTERNAL MEDICINE | Facility: CLINIC | Age: 63
End: 2018-11-06

## 2018-11-06 NOTE — TELEPHONE ENCOUNTER
----- Message from Guadalupe Guzman DO sent at 11/2/2018  1:14 PM CDT -----  Notify patient diabetes has improved. Continue current medication and increase physical activity.  LDL (bad cholesterol) has increased. Continue taking pravastatin and follow a diabetic diet.   Labs and F/U with [facility] in 3 months.

## 2018-11-07 ENCOUNTER — PES CALL (OUTPATIENT)
Dept: ADMINISTRATIVE | Facility: CLINIC | Age: 63
End: 2018-11-07

## 2018-11-08 ENCOUNTER — TELEPHONE (OUTPATIENT)
Dept: INTERNAL MEDICINE | Facility: CLINIC | Age: 63
End: 2018-11-08

## 2018-11-08 NOTE — TELEPHONE ENCOUNTER
Unable to reach pt scheduled appt for 2/14/19, 2/8/19 for labs and f/u appt, sent appt reminder in mail

## 2018-11-08 NOTE — TELEPHONE ENCOUNTER
----- Message from Guadalupe Guzman DO sent at 11/2/2018  1:14 PM CDT -----  Notify patient diabetes has improved. Continue current medication and increase physical activity.  LDL (bad cholesterol) has increased. Continue taking pravastatin and follow a diabetic diet.   Labs and F/U with [facility] in 3 months.     Detail Level: Detailed

## 2018-11-16 ENCOUNTER — PATIENT OUTREACH (OUTPATIENT)
Dept: ADMINISTRATIVE | Facility: HOSPITAL | Age: 63
End: 2018-11-16

## 2019-01-04 DIAGNOSIS — Z12.39 BREAST CANCER SCREENING: ICD-10-CM

## 2019-01-25 ENCOUNTER — PATIENT OUTREACH (OUTPATIENT)
Dept: ADMINISTRATIVE | Facility: HOSPITAL | Age: 64
End: 2019-01-25

## 2019-01-25 NOTE — PROGRESS NOTES
Chart audit completed.  Attempted to contact pt to schedule dexa scan. Person at home number told me wrong number and mobile reports not a working number.

## 2019-02-11 ENCOUNTER — OFFICE VISIT (OUTPATIENT)
Dept: INTERNAL MEDICINE | Facility: CLINIC | Age: 64
End: 2019-02-11
Payer: MEDICARE

## 2019-02-11 ENCOUNTER — LAB VISIT (OUTPATIENT)
Dept: LAB | Facility: HOSPITAL | Age: 64
End: 2019-02-11
Attending: INTERNAL MEDICINE
Payer: MEDICARE

## 2019-02-11 VITALS
OXYGEN SATURATION: 99 % | WEIGHT: 258.81 LBS | HEIGHT: 63 IN | HEART RATE: 80 BPM | DIASTOLIC BLOOD PRESSURE: 104 MMHG | SYSTOLIC BLOOD PRESSURE: 196 MMHG | BODY MASS INDEX: 45.86 KG/M2

## 2019-02-11 VITALS
TEMPERATURE: 98 F | HEART RATE: 82 BPM | SYSTOLIC BLOOD PRESSURE: 160 MMHG | BODY MASS INDEX: 45.86 KG/M2 | WEIGHT: 258.81 LBS | DIASTOLIC BLOOD PRESSURE: 94 MMHG | HEIGHT: 63 IN

## 2019-02-11 DIAGNOSIS — E11.65 UNCONTROLLED TYPE 2 DIABETES MELLITUS WITH HYPERGLYCEMIA: ICD-10-CM

## 2019-02-11 DIAGNOSIS — Z00.00 ENCOUNTER FOR PREVENTIVE HEALTH EXAMINATION: Primary | ICD-10-CM

## 2019-02-11 DIAGNOSIS — E78.5 HYPERLIPIDEMIA LDL GOAL <70: ICD-10-CM

## 2019-02-11 DIAGNOSIS — E78.5 HYPERLIPIDEMIA ASSOCIATED WITH TYPE 2 DIABETES MELLITUS: ICD-10-CM

## 2019-02-11 DIAGNOSIS — E66.01 OBESITY, CLASS III, BMI 40-49.9 (MORBID OBESITY): ICD-10-CM

## 2019-02-11 DIAGNOSIS — M89.9 BONE DISORDER: Primary | ICD-10-CM

## 2019-02-11 DIAGNOSIS — E87.6 DIURETIC-INDUCED HYPOKALEMIA: ICD-10-CM

## 2019-02-11 DIAGNOSIS — F20.9 SCHIZOPHRENIA, UNSPECIFIED TYPE: ICD-10-CM

## 2019-02-11 DIAGNOSIS — M19.90 ARTHRITIS: ICD-10-CM

## 2019-02-11 DIAGNOSIS — Z91.81 AT RISK FOR FALLS: ICD-10-CM

## 2019-02-11 DIAGNOSIS — R41.3 MEMORY DIFFICULTIES: ICD-10-CM

## 2019-02-11 DIAGNOSIS — R32 URINARY INCONTINENCE, UNSPECIFIED TYPE: ICD-10-CM

## 2019-02-11 DIAGNOSIS — T50.2X5A DIURETIC-INDUCED HYPOKALEMIA: ICD-10-CM

## 2019-02-11 DIAGNOSIS — E11.69 HYPERLIPIDEMIA ASSOCIATED WITH TYPE 2 DIABETES MELLITUS: ICD-10-CM

## 2019-02-11 DIAGNOSIS — H91.90 HEARING DIFFICULTY, UNSPECIFIED LATERALITY: ICD-10-CM

## 2019-02-11 DIAGNOSIS — I10 HYPERTENSION, UNSPECIFIED TYPE: ICD-10-CM

## 2019-02-11 DIAGNOSIS — I10 HYPERTENSION GOAL BP (BLOOD PRESSURE) < 130/80: ICD-10-CM

## 2019-02-11 PROBLEM — E66.813 OBESITY, CLASS III, BMI 40-49.9 (MORBID OBESITY): Status: ACTIVE | Noted: 2019-02-11

## 2019-02-11 LAB
ALBUMIN SERPL BCP-MCNC: 3.4 G/DL
ALP SERPL-CCNC: 120 U/L
ALT SERPL W/O P-5'-P-CCNC: 6 U/L
ANION GAP SERPL CALC-SCNC: 7 MMOL/L
AST SERPL-CCNC: 12 U/L
BILIRUB SERPL-MCNC: 0.4 MG/DL
BUN SERPL-MCNC: 12 MG/DL
CALCIUM SERPL-MCNC: 9.6 MG/DL
CHLORIDE SERPL-SCNC: 99 MMOL/L
CHOLEST SERPL-MCNC: 198 MG/DL
CHOLEST/HDLC SERPL: 4.3 {RATIO}
CO2 SERPL-SCNC: 34 MMOL/L
CREAT SERPL-MCNC: 1.1 MG/DL
EST. GFR  (AFRICAN AMERICAN): >60 ML/MIN/1.73 M^2
EST. GFR  (NON AFRICAN AMERICAN): 52.8 ML/MIN/1.73 M^2
ESTIMATED AVG GLUCOSE: 174 MG/DL
GLUCOSE SERPL-MCNC: 167 MG/DL
HBA1C MFR BLD HPLC: 7.7 %
HDLC SERPL-MCNC: 46 MG/DL
HDLC SERPL: 23.2 %
LDLC SERPL CALC-MCNC: 130.8 MG/DL
NONHDLC SERPL-MCNC: 152 MG/DL
POTASSIUM SERPL-SCNC: 3.5 MMOL/L
PROT SERPL-MCNC: 8.2 G/DL
SODIUM SERPL-SCNC: 140 MMOL/L
TRIGL SERPL-MCNC: 106 MG/DL

## 2019-02-11 PROCEDURE — 99214 OFFICE O/P EST MOD 30 MIN: CPT | Mod: S$PBB,,, | Performed by: PHYSICIAN ASSISTANT

## 2019-02-11 PROCEDURE — 99999 PR PBB SHADOW E&M-EST. PATIENT-LVL IV: ICD-10-PCS | Mod: PBBFAC,,, | Performed by: NURSE PRACTITIONER

## 2019-02-11 PROCEDURE — G0439 PPPS, SUBSEQ VISIT: HCPCS | Mod: S$GLB,,, | Performed by: NURSE PRACTITIONER

## 2019-02-11 PROCEDURE — 80053 COMPREHEN METABOLIC PANEL: CPT

## 2019-02-11 PROCEDURE — 99214 OFFICE O/P EST MOD 30 MIN: CPT | Mod: PBBFAC,27 | Performed by: PHYSICIAN ASSISTANT

## 2019-02-11 PROCEDURE — 99999 PR PBB SHADOW E&M-EST. PATIENT-LVL IV: ICD-10-PCS | Mod: PBBFAC,,, | Performed by: PHYSICIAN ASSISTANT

## 2019-02-11 PROCEDURE — 36415 COLL VENOUS BLD VENIPUNCTURE: CPT

## 2019-02-11 PROCEDURE — 99214 PR OFFICE/OUTPT VISIT, EST, LEVL IV, 30-39 MIN: ICD-10-PCS | Mod: S$PBB,,, | Performed by: PHYSICIAN ASSISTANT

## 2019-02-11 PROCEDURE — 80061 LIPID PANEL: CPT

## 2019-02-11 PROCEDURE — G0439 PR MEDICARE ANNUAL WELLNESS SUBSEQUENT VISIT: ICD-10-PCS | Mod: S$GLB,,, | Performed by: NURSE PRACTITIONER

## 2019-02-11 PROCEDURE — 99999 PR PBB SHADOW E&M-EST. PATIENT-LVL IV: CPT | Mod: PBBFAC,,, | Performed by: NURSE PRACTITIONER

## 2019-02-11 PROCEDURE — 83036 HEMOGLOBIN GLYCOSYLATED A1C: CPT

## 2019-02-11 PROCEDURE — 99999 PR PBB SHADOW E&M-EST. PATIENT-LVL IV: CPT | Mod: PBBFAC,,, | Performed by: PHYSICIAN ASSISTANT

## 2019-02-11 PROCEDURE — 99214 OFFICE O/P EST MOD 30 MIN: CPT | Mod: PBBFAC | Performed by: NURSE PRACTITIONER

## 2019-02-11 RX ORDER — POTASSIUM CHLORIDE 750 MG/1
10 TABLET, EXTENDED RELEASE ORAL DAILY
Qty: 90 TABLET | Refills: 1 | Status: SHIPPED | OUTPATIENT
Start: 2019-02-11 | End: 2019-08-14 | Stop reason: SDUPTHER

## 2019-02-11 RX ORDER — LOSARTAN POTASSIUM AND HYDROCHLOROTHIAZIDE 25; 100 MG/1; MG/1
1 TABLET ORAL DAILY
Qty: 90 TABLET | Refills: 1 | Status: SHIPPED | OUTPATIENT
Start: 2019-02-11 | End: 2019-09-04 | Stop reason: SDUPTHER

## 2019-02-11 RX ORDER — METFORMIN HYDROCHLORIDE 1000 MG/1
1000 TABLET ORAL 2 TIMES DAILY WITH MEALS
Qty: 180 TABLET | Refills: 1 | Status: SHIPPED | OUTPATIENT
Start: 2019-02-11 | End: 2019-07-21 | Stop reason: SDUPTHER

## 2019-02-11 RX ORDER — METOPROLOL SUCCINATE 25 MG/1
25 TABLET, EXTENDED RELEASE ORAL DAILY
Qty: 90 TABLET | Refills: 1 | Status: SHIPPED | OUTPATIENT
Start: 2019-02-11 | End: 2019-08-14 | Stop reason: SDUPTHER

## 2019-02-11 RX ORDER — PRAVASTATIN SODIUM 40 MG/1
40 TABLET ORAL DAILY
Qty: 90 TABLET | Refills: 1 | Status: SHIPPED | OUTPATIENT
Start: 2019-02-11 | End: 2019-08-14 | Stop reason: SDUPTHER

## 2019-02-11 NOTE — PROGRESS NOTES
Subjective:       Patient ID: Carisa Curry is a 64 y.o. female.    Chief Complaint: Hypertension (follow up)    Diabetes   She presents for her follow-up diabetic visit. She has type 2 diabetes mellitus. Her disease course has been stable. There are no hypoglycemic associated symptoms. Pertinent negatives for diabetes include no chest pain and no fatigue. There are no hypoglycemic complications. Symptoms are stable. Risk factors for coronary artery disease include diabetes mellitus, dyslipidemia, family history, hypertension, obesity, sedentary lifestyle and post-menopausal. Current diabetic treatments: States she has been off all meds for a month. She is compliant with treatment some of the time.   Hyperlipidemia   This is a chronic problem. The current episode started more than 1 year ago. Pertinent negatives include no chest pain or shortness of breath. Current antihyperlipidemic treatment includes statins. Compliance problems include psychosocial issues (has schizophrenia).  Risk factors for coronary artery disease include diabetes mellitus, obesity, hypertension, a sedentary lifestyle, post-menopausal, family history and dyslipidemia.   Hypertension   This is a chronic problem. The current episode started more than 1 year ago. The problem has been waxing and waning since onset. The problem is uncontrolled. Pertinent negatives include no chest pain or shortness of breath. Risk factors for coronary artery disease include diabetes mellitus, dyslipidemia, family history, obesity, post-menopausal state and sedentary lifestyle. Past treatments include beta blockers, calcium channel blockers and ACE inhibitors. The current treatment provides no improvement. Compliance problems include psychosocial issues (Has been off all medication for a month).      Past Medical History:   Diagnosis Date    Arthritis     CHF (congestive heart failure)     Diabetes mellitus, type 2     Diabetic retinopathy      Hyperlipidemia     Hypertension     Schizophrenia     Seizures     reported per pt and sister, nothing since childhood       Review of Systems   Constitutional: Negative for chills and fatigue.   Respiratory: Negative for chest tightness and shortness of breath.    Cardiovascular: Negative for chest pain.   Gastrointestinal: Negative for abdominal pain.       Objective:      Physical Exam   Constitutional: She appears well-developed and well-nourished. No distress.   HENT:   Head: Normocephalic and atraumatic.   Right Ear: Tympanic membrane and ear canal normal.   Left Ear: Tympanic membrane and ear canal normal.   Neck: Neck supple.   Cardiovascular: Normal rate and regular rhythm. Exam reveals no gallop and no friction rub.   No murmur heard.  Pulmonary/Chest: Effort normal and breath sounds normal. No stridor. No respiratory distress. She has no wheezes. She has no rales. She exhibits no tenderness.   Abdominal: Soft. There is no tenderness.   Skin: She is not diaphoretic.   Nursing note and vitals reviewed.    lab drawn today  Assessment:       1. Bone disorder    2. Hyperlipidemia LDL goal <70    3. Diuretic-induced hypokalemia    4. Hypertension goal BP (blood pressure) < 130/80    5. Uncontrolled type 2 diabetes mellitus with microalbuminuria, without long-term current use of insulin    6. Schizophrenia, unspecified type        Plan:       Bone disorder  -     DXA Bone Density Spine And Hip; Future; Expected date: 02/11/2019    Hyperlipidemia LDL goal <70  -     pravastatin (PRAVACHOL) 40 MG tablet; Take 1 tablet (40 mg total) by mouth once daily.  Dispense: 90 tablet; Refill: 1  -     Ambulatory referral to Outpatient Case Management    Diuretic-induced hypokalemia  -     potassium chloride (KLOR-CON) 10 MEQ TbSR; Take 1 tablet (10 mEq total) by mouth once daily.  Dispense: 90 tablet; Refill: 1  -     Ambulatory referral to Home Health  -     Ambulatory referral to Outpatient Case  Management    Hypertension goal BP (blood pressure) < 130/80  -     metoprolol succinate (TOPROL-XL) 25 MG 24 hr tablet; Take 1 tablet (25 mg total) by mouth once daily.  Dispense: 90 tablet; Refill: 1  -     losartan-hydrochlorothiazide 100-25 mg (HYZAAR) 100-25 mg per tablet; Take 1 tablet by mouth once daily.  Dispense: 90 tablet; Refill: 1  -     Ambulatory referral to Home Health  -     Ambulatory referral to Outpatient Case Management    Uncontrolled type 2 diabetes mellitus with microalbuminuria, without long-term current use of insulin  -     metFORMIN (GLUCOPHAGE) 1000 MG tablet; Take 1 tablet (1,000 mg total) by mouth 2 (two) times daily with meals.  Dispense: 180 tablet; Refill: 1  -     Ambulatory referral to Home Health  -     Ambulatory referral to Outpatient Case Management    Schizophrenia, unspecified type  -     Ambulatory referral to Home Health  -     Ambulatory referral to Outpatient Case Management

## 2019-02-11 NOTE — Clinical Note
Your patient was seen today for a HRA visit. Abnormalities (BOLDED) have been identified during this visit that may require additional testing and  follow up. I have included a copy of my visit note, please review the note and feel free to contact me with any questions. Thank you for allowing me to participate in the care of your patients. Heidy Bradley NP

## 2019-02-11 NOTE — PATIENT INSTRUCTIONS
Counseling and Referral of Other Preventative  (Italic type indicates deductible and co-insurance are waived)    Patient Name: Carisa Curry  Today's Date: 2/11/2019    Health Maintenance       Date Due Completion Date    TETANUS VACCINE 01/19/1972 ---    DEXA SCAN 01/19/1994 ---    Mammogram 09/24/2017 9/24/2015    Eye Exam 06/27/2018 6/27/2017    Override on 6/12/2017: Done    Hemoglobin A1c 04/30/2019 10/31/2018    Foot Exam 10/31/2019 10/31/2018    Lipid Panel 10/31/2019 10/31/2018    Urine Microalbumin 10/31/2019 10/31/2018    Low Dose Statin 02/11/2020 2/11/2019    Pneumococcal Vaccine (65+ Low/Medium Risk) (2 of 2 - PPSV23) 06/15/2022 6/15/2017    Colonoscopy 09/16/2025 9/16/2015 (Declined)    Override on 9/16/2015: Declined (Annual FOBT)        No orders of the defined types were placed in this encounter.    The following information is provided to all patients.  This information is to help you find resources for any of the problems found today that may be affecting your health:                Living healthy guide: www.[facility].[place].gov      Understanding Diabetes: www.diabetes.org      Eating healthy: www.cdc.gov/healthyweight      CDC home safety checklist: www.cdc.gov/steadi/patient.html      Agency on Aging: www.goea.[place].[facility]      Alcoholics anonymous (AA): www.aa.org      Physical Activity: www.prashant.nih.gov/ta1iolo      Tobacco use: www.quitwithusla.org

## 2019-02-11 NOTE — PROGRESS NOTES
"Carisa Curry presented for a  Medicare AWV and comprehensive Health Risk Assessment today. The following components were reviewed and updated:    · Medical history  · Family History  · Social history  · Allergies and Current Medications  · Health Risk Assessment  · Health Maintenance  · Care Team     ** See Completed Assessments for Annual Wellness Visit within the encounter summary.**       The following assessments were completed:  · Living Situation  · CAGE  · Depression Screening  · Timed Get Up and Go  · Whisper Test  · Cognitive Function Screening  · Nutrition Screening  · ADL Screening  · PAQ Screening    Vitals:    02/11/19 1019 02/11/19 1055   BP: (!) 198/94 (!) 196/104   BP Method: Large (Manual) Large (Manual)   Pulse: 80    SpO2: 99%    Weight: 117.4 kg (258 lb 13.1 oz)    Height: 5' 3.19" (1.605 m)      Body mass index is 45.57 kg/m².  Physical Exam   Constitutional: She appears well-developed and well-nourished.   Patient accompanied by sisters, who were present throughout the visit and aided in information gathering.      HENT:   Head: Normocephalic.   Cardiovascular: Normal rate, regular rhythm and normal heart sounds.   No murmur heard.  Pulmonary/Chest: Effort normal and breath sounds normal. No respiratory distress.   Abdominal: Soft. She exhibits no mass. There is no tenderness.   Musculoskeletal: Normal range of motion. She exhibits no edema.   Neurological: She is alert. She exhibits normal muscle tone.   Oriented to person, place, and month. Unable to give correct year.    Skin: Skin is warm, dry and intact.   Psychiatric: She has a normal mood and affect. Her speech is normal and behavior is normal.   Nursing note and vitals reviewed.        Diagnoses and health risks identified today and associated recommendations/orders:    1. Encounter for preventive health examination  She will discuss health maintenance with PA at next visit.     2. Hypertension, unspecified type  BP elevated at today's " visit. Reports she feels fine. Discussed s/s of MI and stroke (patient denies any s/s at this time) and advised to go to the ER if occur. Sister reports her blood pressure readings at home are usually 130s-140s/80s but in the last couple of weeks (reports has not been taken one of the blood pressure medications due to recall), they have been running 160s-170s/90s.   Advised to follow up with internal medicine PA, JASBIR Patricia, for further evaluation and treatment. They expressed understanding.  She will see him immediately following HRA.     3. Uncontrolled type 2 diabetes mellitus with hyperglycemia  A1C and lipid drawn today. She will follow up with PCP for results.   Encouraged daily foot inspections.   Encouraged yearly eye exams.  Scheduled for patient.   Continue current treatment plan as previously prescribed with your PCP.     4. Hyperlipidemia associated with type 2 diabetes mellitus  See #3    5. Schizophrenia, unspecified type  She and her sisters report was diagnosed many years ago during a mental health hospitalization. Was on medication in past but nothing recently.   Psychiatry department contact information given.   Advised to follow up with psychiatry for further evaluation and treatment. They expressed understanding.      6. Arthritis  Per patient stable and controlled.   Stable and controlled. Continue current treatment plan as previously prescribed with your PCP.     7. Obesity, Class III, BMI 40-49.9 (morbid obesity)  Encouraged healthy diet and exercise as tolerated to help bring BMI into normal range.   Continue current treatment plan as previously prescribed with your PCP.     8. At risk for falls  Reports 2 falls in the last 12 months. Uses a cane to aid with ambulation.   Abnormal timed get up and go test.   Fall precautions reviewed with patient. Advised to follow up with PCP for further recommendations. They expressed understanding.      9. Memory difficulties  Reports memory difficulties.  Reports severe difficulty in concentrating, remembering, and making decisions. Lives with sister.   Abnormal cognitive function screening (0).   Advised to follow up with PCP for further evaluation and recommendations. They expressed understanding.      10. Urinary incontinence, unspecified type  Reports UI, not new and not worse. Wears depends.   Advised to follow up with PCP for further evaluation and recommendations. They expressed understanding.      11. Hearing difficulty, unspecified laterality  Abnormal whisper test-left abnormal; right normal; could hear words but not make them out  Reports difficulty hearing and tinnitus in right ear.   Advised to follow up with PCP for further evaluation and recommendations. They expressed understanding.      Provided Carisa with a 5-10 year written screening schedule and personal prevention plan.  Education, counseling, and referrals were provided as needed. After Visit Summary printed and given to patient which includes a list of additional screenings\tests needed.    Follow-up in about 1 year (around 2/11/2020) for AWV.    Heidy Bradley NP

## 2019-02-13 ENCOUNTER — OUTPATIENT CASE MANAGEMENT (OUTPATIENT)
Dept: ADMINISTRATIVE | Facility: OTHER | Age: 64
End: 2019-02-13

## 2019-02-13 ENCOUNTER — TELEPHONE (OUTPATIENT)
Dept: INTERNAL MEDICINE | Facility: CLINIC | Age: 64
End: 2019-02-13

## 2019-02-13 NOTE — TELEPHONE ENCOUNTER
----- Message from Petra Morocho sent at 2/13/2019 11:32 AM CST -----  Thank you for the referral.  Patient has been assigned to Ally Kwon LMSW for low risk screening for Outpatient Case Management.     Reason for referral:   Hyperlipidemia LDL goal <70  Diuretic-induced hypokalemia  Hypertension goal BP (blood pressure) < 130/80  Uncontrolled type 2 diabetes mellitus with microalbuminuria, without long-term current use of insulin  Schizophrenia, unspecified type    Please contact [facility] at [phone] with any questions.    Petra Morocho    [facility]

## 2019-02-13 NOTE — PROGRESS NOTES
Thank you for the referral.  Patient has been assigned to Ally Kwon LMSW for low risk screening for Outpatient Case Management.     Reason for referral:   Hyperlipidemia LDL goal <70  Diuretic-induced hypokalemia  Hypertension goal BP (blood pressure) < 130/80  Uncontrolled type 2 diabetes mellitus with microalbuminuria, without long-term current use of insulin  Schizophrenia, unspecified type    Please contact [facility] at [phone]. [phone] with any questions.    Petra Morocho    [facility]

## 2019-02-14 ENCOUNTER — OUTPATIENT CASE MANAGEMENT (OUTPATIENT)
Dept: ADMINISTRATIVE | Facility: OTHER | Age: 64
End: 2019-02-14

## 2019-02-18 ENCOUNTER — OUTPATIENT CASE MANAGEMENT (OUTPATIENT)
Dept: ADMINISTRATIVE | Facility: OTHER | Age: 64
End: 2019-02-18

## 2019-02-18 ENCOUNTER — TELEPHONE (OUTPATIENT)
Dept: INTERNAL MEDICINE | Facility: CLINIC | Age: 64
End: 2019-02-18

## 2019-02-18 NOTE — LETTER
February 18, 2019    Carisa Curry  [address] [place] [id number]             [facility] [address] [place] [address] Dear: Carisa Curry     I am writing from the Outpatient Complex Care Management Department at [facility].  I received a referral from Albert Patricia PA-C to contact you or your caregiver regarding any needs you may have. I have attempted to contact you or your cargeiver by phone two times unsuccessfully.  Please contact the Outpatient Complex Care Management Department at [phone] if you would like to discuss your needs.      Sincerely,         Ally Kwon LMSW

## 2019-02-18 NOTE — TELEPHONE ENCOUNTER
----- Message from Ally Kwon LMSW sent at 2/18/2019  9:26 AM CST -----  This SW received a referral on the above patient. I have attempted to contact the patient or caregiver by phone two times unsuccessfully and mailed a letter with our contact information.    Thank you for the referral,    Ally Kwon LMSW

## 2019-03-06 ENCOUNTER — TELEPHONE (OUTPATIENT)
Dept: INTERNAL MEDICINE | Facility: CLINIC | Age: 64
End: 2019-03-06

## 2019-03-06 NOTE — TELEPHONE ENCOUNTER
----- Message from Guadalupe Guzman DO sent at 2/26/2019  1:38 PM CST -----  Notify patient diabetes has worsened slightly. Please review medications with patient. Make sure she is taking metformin and Trulicity. I recommend she follow her diet and increase physical activity. I recommend home glucose monitoring.   Lipids are not at goal. Please verify that she is taking pravastatin.   Schedule A1C, CMP, lipid panel and f/u in 3 months.

## 2019-03-14 ENCOUNTER — TELEPHONE (OUTPATIENT)
Dept: INTERNAL MEDICINE | Facility: CLINIC | Age: 64
End: 2019-03-14

## 2019-05-21 ENCOUNTER — LAB VISIT (OUTPATIENT)
Dept: LAB | Facility: HOSPITAL | Age: 64
End: 2019-05-21
Payer: MEDICARE

## 2019-05-21 DIAGNOSIS — E78.5 HYPERLIPIDEMIA LDL GOAL <70: ICD-10-CM

## 2019-05-21 DIAGNOSIS — I10 HYPERTENSION GOAL BP (BLOOD PRESSURE) < 130/80: ICD-10-CM

## 2019-05-21 LAB
ALBUMIN SERPL BCP-MCNC: 3.4 G/DL (ref 3.5–5.2)
ALP SERPL-CCNC: 107 U/L (ref 55–135)
ALT SERPL W/O P-5'-P-CCNC: 6 U/L (ref 10–44)
ANION GAP SERPL CALC-SCNC: 9 MMOL/L (ref 8–16)
AST SERPL-CCNC: 11 U/L (ref 10–40)
BILIRUB SERPL-MCNC: 0.3 MG/DL (ref 0.1–1)
BUN SERPL-MCNC: 12 MG/DL (ref 8–23)
CALCIUM SERPL-MCNC: 9.6 MG/DL (ref 8.7–10.5)
CHLORIDE SERPL-SCNC: 105 MMOL/L (ref 95–110)
CHOLEST SERPL-MCNC: 131 MG/DL (ref 120–199)
CHOLEST/HDLC SERPL: 2.8 {RATIO} (ref 2–5)
CO2 SERPL-SCNC: 27 MMOL/L (ref 23–29)
CREAT SERPL-MCNC: 0.9 MG/DL (ref 0.5–1.4)
EST. GFR  (AFRICAN AMERICAN): >60 ML/MIN/1.73 M^2
EST. GFR  (NON AFRICAN AMERICAN): >60 ML/MIN/1.73 M^2
ESTIMATED AVG GLUCOSE: 169 MG/DL (ref 68–131)
GLUCOSE SERPL-MCNC: 137 MG/DL (ref 70–110)
HBA1C MFR BLD HPLC: 7.5 % (ref 4–5.6)
HDLC SERPL-MCNC: 46 MG/DL (ref 40–75)
HDLC SERPL: 35.1 % (ref 20–50)
LDLC SERPL CALC-MCNC: 69.4 MG/DL (ref 63–159)
NONHDLC SERPL-MCNC: 85 MG/DL
POTASSIUM SERPL-SCNC: 4.1 MMOL/L (ref 3.5–5.1)
PROT SERPL-MCNC: 7.7 G/DL (ref 6–8.4)
SODIUM SERPL-SCNC: 141 MMOL/L (ref 136–145)
TRIGL SERPL-MCNC: 78 MG/DL (ref 30–150)

## 2019-05-21 PROCEDURE — 80053 COMPREHEN METABOLIC PANEL: CPT

## 2019-05-21 PROCEDURE — 36415 COLL VENOUS BLD VENIPUNCTURE: CPT

## 2019-05-21 PROCEDURE — 80061 LIPID PANEL: CPT

## 2019-05-21 PROCEDURE — 83036 HEMOGLOBIN GLYCOSYLATED A1C: CPT

## 2019-05-27 ENCOUNTER — HOSPITAL ENCOUNTER (OUTPATIENT)
Dept: RADIOLOGY | Facility: HOSPITAL | Age: 64
Discharge: HOME | End: 2019-05-27
Attending: INTERNAL MEDICINE
Payer: MEDICARE

## 2019-05-27 ENCOUNTER — OFFICE VISIT (OUTPATIENT)
Dept: INTERNAL MEDICINE | Facility: CLINIC | Age: 64
End: 2019-05-27
Payer: MEDICARE

## 2019-05-27 ENCOUNTER — TELEPHONE (OUTPATIENT)
Dept: INTERNAL MEDICINE | Facility: CLINIC | Age: 64
End: 2019-05-27

## 2019-05-27 VITALS
OXYGEN SATURATION: 97 % | HEART RATE: 88 BPM | SYSTOLIC BLOOD PRESSURE: 130 MMHG | TEMPERATURE: 97 F | HEIGHT: 63 IN | DIASTOLIC BLOOD PRESSURE: 70 MMHG | BODY MASS INDEX: 45.55 KG/M2 | WEIGHT: 257.06 LBS

## 2019-05-27 DIAGNOSIS — M25.562 BILATERAL CHRONIC KNEE PAIN: Primary | ICD-10-CM

## 2019-05-27 DIAGNOSIS — M25.562 CHRONIC PAIN OF BOTH KNEES: ICD-10-CM

## 2019-05-27 DIAGNOSIS — E66.01 MORBID OBESITY WITH BMI OF 45.0-49.9, ADULT: ICD-10-CM

## 2019-05-27 DIAGNOSIS — G89.29 CHRONIC PAIN OF BOTH KNEES: ICD-10-CM

## 2019-05-27 DIAGNOSIS — M17.10 ARTHRITIS OF KNEE: ICD-10-CM

## 2019-05-27 DIAGNOSIS — I10 HYPERTENSION GOAL BP (BLOOD PRESSURE) < 130/80: Primary | ICD-10-CM

## 2019-05-27 DIAGNOSIS — M25.561 BILATERAL CHRONIC KNEE PAIN: Primary | ICD-10-CM

## 2019-05-27 DIAGNOSIS — M25.561 CHRONIC PAIN OF BOTH KNEES: ICD-10-CM

## 2019-05-27 DIAGNOSIS — E78.5 HYPERLIPIDEMIA LDL GOAL <70: ICD-10-CM

## 2019-05-27 DIAGNOSIS — G89.29 BILATERAL CHRONIC KNEE PAIN: Primary | ICD-10-CM

## 2019-05-27 PROCEDURE — 99214 OFFICE O/P EST MOD 30 MIN: CPT | Mod: PBBFAC,25 | Performed by: INTERNAL MEDICINE

## 2019-05-27 PROCEDURE — 99214 OFFICE O/P EST MOD 30 MIN: CPT | Mod: S$PBB,,, | Performed by: INTERNAL MEDICINE

## 2019-05-27 PROCEDURE — 73562 X-RAY EXAM OF KNEE 3: CPT | Mod: TC,50

## 2019-05-27 PROCEDURE — 99214 PR OFFICE/OUTPT VISIT, EST, LEVL IV, 30-39 MIN: ICD-10-PCS | Mod: S$PBB,,, | Performed by: INTERNAL MEDICINE

## 2019-05-27 PROCEDURE — 73562 X-RAY EXAM OF KNEE 3: CPT | Mod: 26,50,, | Performed by: RADIOLOGY

## 2019-05-27 PROCEDURE — 73562 XR KNEE ORTHO BILAT: ICD-10-PCS | Mod: 26,50,, | Performed by: RADIOLOGY

## 2019-05-27 PROCEDURE — 99999 PR PBB SHADOW E&M-EST. PATIENT-LVL IV: CPT | Mod: PBBFAC,,, | Performed by: INTERNAL MEDICINE

## 2019-05-27 PROCEDURE — 99999 PR PBB SHADOW E&M-EST. PATIENT-LVL IV: ICD-10-PCS | Mod: PBBFAC,,, | Performed by: INTERNAL MEDICINE

## 2019-05-27 NOTE — PROGRESS NOTES
"Subjective:       Patient ID: Carisa Curry is a 64 y.o. female.    Chief Complaint: Follow-up    Carisa Curry  64 y.o. Black or  female    Patient presents with:  Follow-up    HPI: Here today to follow up on chronic conditions. She is here with her care provider.   HTN--slightly elevated in the clinic but she has not taken her medication today. She checks her b/p at home and it runs in the 130's/70's.   Diabetes--improved but not at goal. She is compliant with metformin and Trulicity. She follows her diet but is non compliant at times.                        HGBA1C                   7.5 (H)             05/21/2019            HLD--improved. Compliant with pravastatin.                       CHOL                     131                 05/21/2019                      HDL                      46                  05/21/2019                 LDLCALC                  69.4                05/21/2019                        TRIG                     78                  05/21/2019            She has worsening bilateral knee pain. It has been going on for a while. Pain is worse with walking. She uses a cane. She has tried Tylenol and tramadol without relief. She has not seen an orthopedist or had x-rays.       Past Medical History:  Arthritis  CHF (congestive heart failure)  Diabetes mellitus, type 2  Diabetic retinopathy  Hyperlipidemia  Hypertension  Schizophrenia  Seizures      Comment:  reported per pt and sister, nothing since childhood    Current Outpatient Medications on File Prior to Visit:  amLODIPine (NORVASC) 10 MG tablet, Take 1 tablet (10 mg total) by mouth once daily., Disp: 90 tablet, Rfl: 1  blood sugar diagnostic Strp, Twice daily glucose testing., Disp: 60 strip, Rfl: 6  dulaglutide (TRULICITY) 1.5 mg/0.5 mL PnIj, Inject 1.5 mg into the skin every 7 days., Disp: 12 mL, Rfl: 1  insulin needles, disposable, (PEN NEEDLE) 31 X 5/16 " Ndle, Once a day insulin injection., Disp: 30 each, " Rfl: 6  lancets Misc, Twice daily glucose monitoring., Disp: 60 each, Rfl: 6  losartan-hydrochlorothiazide 100-25 mg (HYZAAR) 100-25 mg per tablet, Take 1 tablet by mouth once daily., Disp: 90 tablet, Rfl: 1  metFORMIN (GLUCOPHAGE) 1000 MG tablet, Take 1 tablet (1,000 mg total) by mouth 2 (two) times daily with meals., Disp: 180 tablet, Rfl: 1  metoprolol succinate (TOPROL-XL) 25 MG 24 hr tablet, Take 1 tablet (25 mg total) by mouth once daily., Disp: 90 tablet, Rfl: 1  potassium chloride (KLOR-CON) 10 MEQ TbSR, Take 1 tablet (10 mEq total) by mouth once daily., Disp: 90 tablet, Rfl: 1  pravastatin (PRAVACHOL) 40 MG tablet, Take 1 tablet (40 mg total) by mouth once daily., Disp: 90 tablet, Rfl: 1  blood-glucose meter kit, Use as instructed, Disp: 1 each, Rfl: 0    Allergies:  Review of patient's allergies indicates:  No Known Allergies        Review of Systems   Constitutional: Negative for fever and unexpected weight change.   Respiratory: Negative for cough and shortness of breath.    Cardiovascular: Positive for leg swelling. Negative for chest pain.   Gastrointestinal: Negative for abdominal pain.   Musculoskeletal: Positive for arthralgias and gait problem.   Neurological: Negative for dizziness, numbness and headaches.   Psychiatric/Behavioral: Negative for sleep disturbance.       Objective:      Physical Exam   Constitutional: She is oriented to person, place, and time. She appears well-developed and well-nourished. No distress.   Eyes: No scleral icterus.   Neck: No tracheal deviation present.   Cardiovascular: Normal rate, regular rhythm and normal heart sounds.   Pulmonary/Chest: Effort normal and breath sounds normal. No respiratory distress.   Abdominal: Soft. Bowel sounds are normal.   Musculoskeletal: She exhibits edema.        Right knee: She exhibits decreased range of motion. She exhibits no swelling, no ecchymosis, no laceration and no erythema.        Left knee: She exhibits decreased range of  motion. She exhibits no swelling, no ecchymosis, no laceration and no erythema.   Neurological: She is alert and oriented to person, place, and time.   Skin: Skin is warm and dry.   Psychiatric: She has a normal mood and affect.   Vitals reviewed.      Assessment:       1. Hypertension goal BP (blood pressure) < 130/80    2. Uncontrolled type 2 diabetes mellitus with microalbuminuria, without long-term current use of insulin    3. Hyperlipidemia LDL goal <70    4. Chronic pain of both knees    5. Morbid obesity with BMI of 45.0-49.9, adult        Plan:       Carisa was seen today for follow-up.    Diagnoses and all orders for this visit:    Hypertension goal BP (blood pressure) < 130/80  -     Continue amlodipine, losartan-HCTZ and metoprolol   -     Continue home b/p monitoring     Uncontrolled type 2 diabetes mellitus with microalbuminuria, without long-term current use of insulin  -     Continue current management  -     Lifestyle modifications discussed    Hyperlipidemia LDL goal <70  -     Continue pravastatin    Chronic pain of both knees  -     X-ray Knee Ortho Bilateral; Future    Morbid obesity with BMI of 45.0-49.9, adult  -     Lifestyle modifications discussed    Labs and f/u in 3 months.

## 2019-05-27 NOTE — TELEPHONE ENCOUNTER
----- Message from Guadalupe Guzman DO sent at 5/27/2019 10:53 AM CDT -----  Notify patient knee x-rays shows severe arthritis.  I have ordered a referral to orthopedics.

## 2019-05-30 NOTE — PROGRESS NOTES
Subjective:      Patient ID: Carisa Curry is a 64 y.o. female.    Chief Complaint: Pain of the Left Knee and Pain of the Right Knee      HPI: Carisa Curry  is a 64 y.o. female who c/o Pain of the Left Knee and Pain of the Right Knee   for duration of 10 years.  She denies any inciting injury.  Right knee hurts just as bad as the left.  Quality is a constant ache.  She points medially is to wear pain is located.  Severity is 10 out of 10.  Alleviating factors include nothing.  She has tried tramadol previously.  She has also tried Aleve, Advil, Motrin, Tylenol.  Aggravating factors include getting up after prolonged inactivity, prolonged weight-bearing, and range of motion. She uses a cane for ambulation.  She tells me she comes to get her knees checked out because she has started having some falls.    Past Medical History:   Diagnosis Date    Arthritis     CHF (congestive heart failure)     Diabetes mellitus, type 2     Diabetic retinopathy     Hyperlipidemia     Hypertension     Schizophrenia     Seizures     reported per pt and sister, nothing since childhood     Past Surgical History:   Procedure Laterality Date    EYE SURGERY      SPINE SURGERY       Family History   Problem Relation Age of Onset    Kidney disease Mother     Heart failure Mother     Cataracts Mother     Hypertension Mother     Heart disease Mother     Stroke Mother     Cancer Father         brain    Diabetes Father     Diabetes Maternal Aunt     Hypertension Maternal Aunt     Diabetes Paternal Aunt     Hypertension Paternal Aunt     Heart disease Paternal Aunt     Diabetes Paternal Uncle     Heart disease Paternal Uncle      Social History     Socioeconomic History    Marital status: Single     Spouse name: Not on file    Number of children: Not on file    Years of education: Not on file    Highest education level: Not on file   Occupational History    Not on file   Social Needs    Financial  "resource strain: Not on file    Food insecurity:     Worry: Not on file     Inability: Not on file    Transportation needs:     Medical: Not on file     Non-medical: Not on file   Tobacco Use    Smoking status: Never Smoker    Smokeless tobacco: Never Used   Substance and Sexual Activity    Alcohol use: No     Alcohol/week: 0.0 oz    Drug use: No    Sexual activity: Never   Lifestyle    Physical activity:     Days per week: Not on file     Minutes per session: Not on file    Stress: Not on file   Relationships    Social connections:     Talks on phone: Not on file     Gets together: Not on file     Attends Baptism service: Not on file     Active member of club or organization: Not on file     Attends meetings of clubs or organizations: Not on file     Relationship status: Not on file   Other Topics Concern    Not on file   Social History Narrative    Not on file     Medication List with Changes/Refills   New Medications    MELOXICAM (MOBIC) 15 MG TABLET    Take 1 tablet (15 mg total) by mouth once daily. Take with food.  Discontinue if you develop GI side effects.   Current Medications    AMLODIPINE (NORVASC) 10 MG TABLET    Take 1 tablet (10 mg total) by mouth once daily.    BLOOD SUGAR DIAGNOSTIC STRP    Twice daily glucose testing.    BLOOD-GLUCOSE METER KIT    Use as instructed    DULAGLUTIDE (TRULICITY) 1.5 MG/0.5 ML PNIJ    Inject 1.5 mg into the skin every 7 days.    INSULIN NEEDLES, DISPOSABLE, (PEN NEEDLE) 31 X 5/16 " NDLE    Once a day insulin injection.    LANCETS MISC    Twice daily glucose monitoring.    LOSARTAN-HYDROCHLOROTHIAZIDE 100-25 MG (HYZAAR) 100-25 MG PER TABLET    Take 1 tablet by mouth once daily.    METFORMIN (GLUCOPHAGE) 1000 MG TABLET    Take 1 tablet (1,000 mg total) by mouth 2 (two) times daily with meals.    METOPROLOL SUCCINATE (TOPROL-XL) 25 MG 24 HR TABLET    Take 1 tablet (25 mg total) by mouth once daily.    POTASSIUM CHLORIDE (KLOR-CON) 10 MEQ TBSR    Take 1 tablet " (10 mEq total) by mouth once daily.    PRAVASTATIN (PRAVACHOL) 40 MG TABLET    Take 1 tablet (40 mg total) by mouth once daily.     Review of patient's allergies indicates:  No Known Allergies    Review of Systems   Constitution: Negative for fever.   Cardiovascular: Negative for chest pain.   Respiratory: Negative for cough and shortness of breath.    Skin: Negative for rash.   Musculoskeletal: Positive for joint pain, joint swelling and stiffness.   Gastrointestinal: Negative for heartburn.   Neurological: Negative for headaches and numbness.         Objective:        General    Nursing note and vitals reviewed.  Constitutional: She is oriented to person, place, and time. She appears well-developed and well-nourished.   HENT:   Head: Normocephalic and atraumatic.   Eyes: EOM are normal.   Cardiovascular: Normal rate and regular rhythm.    Pulmonary/Chest: Effort normal.   Abdominal: Soft.   Neurological: She is alert and oriented to person, place, and time.   Psychiatric: She has a normal mood and affect. Her behavior is normal.           Right Knee Exam     Inspection   Erythema: absent  Swelling: absent  Effusion: absent  Deformity: present (varus)  Bruising: absent    Tenderness   The patient is tender to palpation of the medial joint line and condyle.    Crepitus   The patient has crepitus of the patella and medial joint line.    Range of Motion   Extension: normal   Flexion:  90 abnormal     Tests   Meniscus   Jennifer:  Medial - negative Lateral - negative  Ligament Examination Lachman: normal (-1 to 2mm) PCL-Posterior Drawer: normal (0 to 2mm)     MCL - Valgus: abnormal - grade I  LCL - Varus: normal  Patella   Patellar apprehension: negative  Passive Patellar Tilt: neutral  Patellar Grind: positive    Other   Meniscal Cyst: absent  Popliteal (Baker's) Cyst: absent  Sensation: normal    Left Knee Exam     Inspection   Erythema: absent  Swelling: absent  Effusion: absent  Deformity: present  (varus)  Bruising: absent    Tenderness   The patient tender to palpation of the condyle and medial joint line.    Crepitus   The patient has crepitus of the patella and medial joint line.    Range of Motion   Extension: normal   Flexion:  90 abnormal     Tests   Meniscus   Jennifer:  Medial - negative Lateral - negative  Stability Lachman: normal (-1 to 2mm) PCL-Posterior Drawer: normal (0 to 2mm)  MCL - Valgus: abnormal - grade I  LCL - Varus: normal (0 to 2mm)  Patella   Patellar apprehension: negative  Passive Patellar Tilt: neutral  Patellar Grind: positive    Other   Meniscal Cyst: absent  Popliteal (Baker's) Cyst: absent  Sensation: normal    Right Hip Exam     Tests   Juan M: negative  Left Hip Exam     Tests   Juan M: negative          Muscle Strength   Right Lower Extremity   Quadriceps:  4/5   Hamstrin/5   Left Lower Extremity   Quadriceps:  4/5   Hamstrin/5     Vascular Exam       Edema  Right Lower Leg: absent  Left Lower Leg: absent            Xray:   Bilateral knees from 2019 images and report were reviewed today.  I agree with the radiologist's interpretation.  Right knee: There is no radiographic evidence of acute osseous, articular, or soft tissue abnormality. There is severe tricompartmental osteoarthritis.  Joint space loss appears most severe in the medial compartment.  Left knee:  There is no radiographic evidence of acute osseous, articular, or soft tissue abnormality. There is severe tricompartmental osteoarthritis.  Joint space loss appears most severe in the medial compartment.    Labs  Hgb A1c 7.5% on 19 - indicates poor glycemic control    Assessment:       Encounter Diagnoses   Name Primary?    Primary osteoarthritis of left knee Yes    Primary osteoarthritis of right knee     Chronic pain of left knee     Chronic pain of right knee     Uncontrolled type 2 diabetes mellitus with microalbuminuria     Obesity, Class III, BMI 40-49.9 (morbid obesity)     Bilateral  chronic knee pain           Plan:       Carias was seen today for pain and pain.    Diagnoses and all orders for this visit:    Primary osteoarthritis of left knee  -     methylPREDNISolone acetate injection 80 mg  -     sodium hyaluronate (EUFLEXXA) 10 mg/mL(mw 2.4 -3.6 million) Syrg 20 mg  -     Prior Authorization Order  -     meloxicam (MOBIC) 15 MG tablet; Take 1 tablet (15 mg total) by mouth once daily. Take with food.  Discontinue if you develop GI side effects.    Primary osteoarthritis of right knee  -     Prior Authorization Order  -     methylPREDNISolone acetate injection 80 mg  -     sodium hyaluronate (EUFLEXXA) 10 mg/mL(mw 2.4 -3.6 million) Syrg 20 mg  -     meloxicam (MOBIC) 15 MG tablet; Take 1 tablet (15 mg total) by mouth once daily. Take with food.  Discontinue if you develop GI side effects.    Chronic pain of left knee  -     methylPREDNISolone acetate injection 80 mg  -     sodium hyaluronate (EUFLEXXA) 10 mg/mL(mw 2.4 -3.6 million) Syrg 20 mg  -     Prior Authorization Order  -     meloxicam (MOBIC) 15 MG tablet; Take 1 tablet (15 mg total) by mouth once daily. Take with food.  Discontinue if you develop GI side effects.    Chronic pain of right knee  -     Prior Authorization Order  -     methylPREDNISolone acetate injection 80 mg  -     sodium hyaluronate (EUFLEXXA) 10 mg/mL(mw 2.4 -3.6 million) Syrg 20 mg  -     meloxicam (MOBIC) 15 MG tablet; Take 1 tablet (15 mg total) by mouth once daily. Take with food.  Discontinue if you develop GI side effects.    Uncontrolled type 2 diabetes mellitus with microalbuminuria    Obesity, Class III, BMI 40-49.9 (morbid obesity)    Bilateral chronic knee pain      Ms. Younger is a new patient with new problems as above.  She has severe arthritis of both knees.  We had a long discussion today regarding degenerative arthritis in the knees. The patient understands that arthritis is chronic and will worsen over time.  The patient also understands that arthritis  may cause episodic flare-ups in pain. Management or if arthritis is achieved through a multi-modal approach including weight loss in obese individuals, activity modification, NSAIDs (topical vs oral) where appropriate, periodic intra-articular steroid injections, viscosupplementation, physical therapy, knee bracing, ambulatory aids, as well as geniculate nerve blocks.  After discussion of risks and benefits of all of the above were discussed, the decision was made to move forward with intra-articular steroid injection in the bilateral knees as well as an authorization request for Euflexxa in the bilateral knees to be given in 1 month.  In the meantime, I have also given her prescription of meloxicam as above as well as a home exercise program.  I have advised trying to avoid narcotic use.  Additionally, we have discussed weight loss.  She verbalizes understanding and agrees with the above plan.      Follow up in about 1 month (around 6/28/2019).    Left Knee Injection Report:  After verbal consent was obtained for left knee injection, patient ID, site, and side were verified.  The  left  knee was sterilly prepped in the standard fashion.  A 22-gauge needle was introduced into left knee joint from an fantasma-lateral site without complication. The left knee was then injected with 20 mg lidocaine plain and 80 mg depomedrol.  A sterile bandaid was applied.  The patient was informed to apply an ice pack approximately 10min once arriving home and not to do anything strenuous for 24hours.  She was instructed to call if there were any problems. The patient was discharged in stable condition.    Right Knee Injection Report:  After verbal consent was obtained for right knee injection, patient ID, site, and side were verified.  The  right  knee was sterilly prepped in the standard fashion.  A 22-gauge needle was introduced into right knee joint from an fantasma-lateral site without complication. The right knee was then injected  with 20 mg lidocaine plain and 80 mg depomedrol.  A sterile bandaid was applied.  The patient was informed to apply an ice pack approximately 10min once arriving home and not to do anything strenuous for 24hours.  She was instructed to call if there were any problems. The patient was discharged in stable condition       The patient understands, chooses and consents to this plan and accepts all   the risks which include but are not limited to the risks mentioned above.     Disclaimer: This note was prepared using a voice recognition system and is likely to have sound alike errors within the text.

## 2019-05-31 ENCOUNTER — OFFICE VISIT (OUTPATIENT)
Dept: ORTHOPEDICS | Facility: CLINIC | Age: 64
End: 2019-05-31
Payer: MEDICARE

## 2019-05-31 VITALS
DIASTOLIC BLOOD PRESSURE: 96 MMHG | SYSTOLIC BLOOD PRESSURE: 198 MMHG | HEIGHT: 63 IN | HEART RATE: 81 BPM | WEIGHT: 257.06 LBS | BODY MASS INDEX: 45.55 KG/M2

## 2019-05-31 DIAGNOSIS — M17.12 PRIMARY OSTEOARTHRITIS OF LEFT KNEE: Primary | ICD-10-CM

## 2019-05-31 DIAGNOSIS — M25.562 CHRONIC PAIN OF LEFT KNEE: ICD-10-CM

## 2019-05-31 DIAGNOSIS — G89.29 CHRONIC PAIN OF RIGHT KNEE: ICD-10-CM

## 2019-05-31 DIAGNOSIS — M25.561 BILATERAL CHRONIC KNEE PAIN: ICD-10-CM

## 2019-05-31 DIAGNOSIS — M25.561 CHRONIC PAIN OF RIGHT KNEE: ICD-10-CM

## 2019-05-31 DIAGNOSIS — M17.11 PRIMARY OSTEOARTHRITIS OF RIGHT KNEE: ICD-10-CM

## 2019-05-31 DIAGNOSIS — G89.29 BILATERAL CHRONIC KNEE PAIN: ICD-10-CM

## 2019-05-31 DIAGNOSIS — G89.29 CHRONIC PAIN OF LEFT KNEE: ICD-10-CM

## 2019-05-31 DIAGNOSIS — M25.562 BILATERAL CHRONIC KNEE PAIN: ICD-10-CM

## 2019-05-31 DIAGNOSIS — E66.01 OBESITY, CLASS III, BMI 40-49.9 (MORBID OBESITY): ICD-10-CM

## 2019-05-31 PROCEDURE — 99999 PR PBB SHADOW E&M-EST. PATIENT-LVL IV: CPT | Mod: PBBFAC,,, | Performed by: PHYSICIAN ASSISTANT

## 2019-05-31 PROCEDURE — 99999 PR PBB SHADOW E&M-EST. PATIENT-LVL IV: ICD-10-PCS | Mod: PBBFAC,,, | Performed by: PHYSICIAN ASSISTANT

## 2019-05-31 PROCEDURE — 20610 PR DRAIN/INJECT LARGE JOINT/BURSA: ICD-10-PCS | Mod: 50,S$PBB,, | Performed by: PHYSICIAN ASSISTANT

## 2019-05-31 PROCEDURE — 99214 OFFICE O/P EST MOD 30 MIN: CPT | Mod: PBBFAC,25 | Performed by: PHYSICIAN ASSISTANT

## 2019-05-31 PROCEDURE — 20610 DRAIN/INJ JOINT/BURSA W/O US: CPT | Mod: 50,S$PBB,, | Performed by: PHYSICIAN ASSISTANT

## 2019-05-31 PROCEDURE — 99214 OFFICE O/P EST MOD 30 MIN: CPT | Mod: 25,S$PBB,, | Performed by: PHYSICIAN ASSISTANT

## 2019-05-31 PROCEDURE — 99214 PR OFFICE/OUTPT VISIT, EST, LEVL IV, 30-39 MIN: ICD-10-PCS | Mod: 25,S$PBB,, | Performed by: PHYSICIAN ASSISTANT

## 2019-05-31 PROCEDURE — 20610 DRAIN/INJ JOINT/BURSA W/O US: CPT | Mod: 50,PBBFAC | Performed by: PHYSICIAN ASSISTANT

## 2019-05-31 RX ORDER — MELOXICAM 15 MG/1
15 TABLET ORAL DAILY
Qty: 30 TABLET | Refills: 1 | Status: SHIPPED | OUTPATIENT
Start: 2019-05-31 | End: 2020-06-15

## 2019-05-31 RX ORDER — METHYLPREDNISOLONE ACETATE 80 MG/ML
80 INJECTION, SUSPENSION INTRA-ARTICULAR; INTRALESIONAL; INTRAMUSCULAR; SOFT TISSUE ONCE
Status: COMPLETED | OUTPATIENT
Start: 2019-05-31 | End: 2019-05-31

## 2019-05-31 RX ADMIN — METHYLPREDNISOLONE ACETATE 80 MG: 80 INJECTION, SUSPENSION INTRALESIONAL; INTRAMUSCULAR; INTRASYNOVIAL; SOFT TISSUE at 09:05

## 2019-05-31 NOTE — LETTER
May 31, 2019      Guadalupe Guzman DO  [address] [place] [address] [address] [id number]           [facility]  [address] [place] [address] [address] [address]  Phone: [phone]  Fax: [phone]          Patient: Carisa Curry   MR Number: [id number]   YOB: 1955   Date of Visit: 5/31/2019       Dear Dr. Guadalupe Guzman:    Thank you for referring Carisa Curry to me for evaluation. Attached you will find relevant portions of my assessment and plan of care.    If you have questions, please do not hesitate to call me. I look forward to following Carisa Curry along with you.    Sincerely,    Radha Valencia PA-C    Enclosure  CC:  No Recipients    If you would like to receive this communication electronically, please contact [email or web address] or [phone] to request more information on [employer] access.    For providers and/or their staff who would like to refer a patient to [facility], please contact us through our one-stop-shop provider referral line, Nimo Pabon, at [phone].    If you feel you have received this communication in error or would no longer like to receive these types of communications, please e-mail [email or web address]

## 2019-05-31 NOTE — PATIENT INSTRUCTIONS
Pre-Knee Replacement: Ankle Pumps, Quad Sets, Leg Raises  Doing these exercises BEFORE your knee replacement can help speed your recovery. Ask your physical therapist (PT) whether you should exercise one or both legs. Unless youre told otherwise, start by doing each exercise five to 10 times (5 to 10 reps) twice a day (2 sets). As you get stronger, slowly increase the number of reps and sets.  Stop any exercise that causes sharp or increased knee pain, dizziness, shortness of breath, or chest pain.   Ankle pumps    Ankle pumps can help prevent circulation problems, such as blood clots. Do ankle pumps by pointing and flexing your feet.  Quadriceps sets    · Lie on your back in bed, legs straight.  · Tighten the muscle at the front of the thigh as you press the back of your knee down toward the bed. Hold for a few seconds. Then relax the leg.  Straight leg raises    · Lie in bed. Bend one leg. Keep your other leg straight on the bed.  · Lift your straight leg as high as you comfortably can, but not higher than 12 inches. Hold for a few seconds. Then slowly lower the leg.  Date Last Reviewed: 10/1/2015  © 9236-6831 [employer]. [address] [place] [address], [place] [address]. All rights reserved. This information is not intended as a substitute for professional medical care. Always follow your healthcare professional's instructions.

## 2019-06-03 ENCOUNTER — OFFICE VISIT (OUTPATIENT)
Dept: OPHTHALMOLOGY | Facility: CLINIC | Age: 64
End: 2019-06-03
Payer: MEDICARE

## 2019-06-03 DIAGNOSIS — E11.9 DIABETES MELLITUS TYPE 2 WITHOUT RETINOPATHY: Primary | ICD-10-CM

## 2019-06-03 DIAGNOSIS — H52.4 BILATERAL PRESBYOPIA: ICD-10-CM

## 2019-06-03 DIAGNOSIS — H25.13 CATARACT, NUCLEAR SCLEROTIC SENILE, BILATERAL: ICD-10-CM

## 2019-06-03 DIAGNOSIS — H52.03 HYPEROPIA, BILATERAL: ICD-10-CM

## 2019-06-03 PROCEDURE — 92015 DETERMINE REFRACTIVE STATE: CPT | Mod: ,,, | Performed by: OPTOMETRIST

## 2019-06-03 PROCEDURE — 92014 COMPRE OPH EXAM EST PT 1/>: CPT | Mod: S$PBB,,, | Performed by: OPTOMETRIST

## 2019-06-03 PROCEDURE — 99211 OFF/OP EST MAY X REQ PHY/QHP: CPT | Mod: PBBFAC | Performed by: OPTOMETRIST

## 2019-06-03 PROCEDURE — 92015 PR REFRACTION: ICD-10-PCS | Mod: ,,, | Performed by: OPTOMETRIST

## 2019-06-03 PROCEDURE — 92014 PR EYE EXAM, EST PATIENT,COMPREHESV: ICD-10-PCS | Mod: S$PBB,,, | Performed by: OPTOMETRIST

## 2019-06-03 PROCEDURE — 99999 PR PBB SHADOW E&M-EST. PATIENT-LVL I: ICD-10-PCS | Mod: PBBFAC,,, | Performed by: OPTOMETRIST

## 2019-06-03 PROCEDURE — 99999 PR PBB SHADOW E&M-EST. PATIENT-LVL I: CPT | Mod: PBBFAC,,, | Performed by: OPTOMETRIST

## 2019-06-03 NOTE — PROGRESS NOTES
HPI     NIDDM exam.   Watery eyes a lot.   Last eye exam 06/12/2017 TRF.   Patient lost glasses x 2 years.  Lab Results       Component                Value               Date                       HGBA1C                   7.5 (H)             05/21/2019                  Last edited by Jonas Major, OD on 6/3/2019  9:09 AM. (History)            Assessment /Plan     For exam results, see Encounter Report.    Diabetes mellitus type 2 without retinopathy    Cataract, nuclear sclerotic senile, bilateral    Hyperopia, bilateral    Bilateral presbyopia      No Background Diabetic Retinopathy    Moderate cataracts OU, not surgical    Dispense Final Rx for glasses.  RTC 1 year  Discussed above and answered questions.

## 2019-07-05 ENCOUNTER — OFFICE VISIT (OUTPATIENT)
Dept: ORTHOPEDICS | Facility: CLINIC | Age: 64
End: 2019-07-05
Payer: MEDICARE

## 2019-07-05 VITALS — BODY MASS INDEX: 45.29 KG/M2 | HEIGHT: 63 IN | WEIGHT: 255.63 LBS

## 2019-07-05 DIAGNOSIS — M17.11 PRIMARY OSTEOARTHRITIS OF RIGHT KNEE: ICD-10-CM

## 2019-07-05 DIAGNOSIS — M17.12 PRIMARY OSTEOARTHRITIS OF LEFT KNEE: Primary | ICD-10-CM

## 2019-07-05 PROCEDURE — 99999 PR PBB SHADOW E&M-EST. PATIENT-LVL III: CPT | Mod: PBBFAC,,, | Performed by: PHYSICIAN ASSISTANT

## 2019-07-05 PROCEDURE — 99213 OFFICE O/P EST LOW 20 MIN: CPT | Mod: PBBFAC,25 | Performed by: PHYSICIAN ASSISTANT

## 2019-07-05 PROCEDURE — 20610 DRAIN/INJ JOINT/BURSA W/O US: CPT | Mod: 50,PBBFAC | Performed by: PHYSICIAN ASSISTANT

## 2019-07-05 PROCEDURE — 99499 UNLISTED E&M SERVICE: CPT | Mod: S$PBB,,, | Performed by: PHYSICIAN ASSISTANT

## 2019-07-05 PROCEDURE — 99999 PR PBB SHADOW E&M-EST. PATIENT-LVL III: ICD-10-PCS | Mod: PBBFAC,,, | Performed by: PHYSICIAN ASSISTANT

## 2019-07-05 PROCEDURE — 20610 PR DRAIN/INJECT LARGE JOINT/BURSA: ICD-10-PCS | Mod: 50,S$PBB,, | Performed by: PHYSICIAN ASSISTANT

## 2019-07-05 PROCEDURE — 20610 DRAIN/INJ JOINT/BURSA W/O US: CPT | Mod: 50,S$PBB,, | Performed by: PHYSICIAN ASSISTANT

## 2019-07-05 PROCEDURE — 99499 NO LOS: ICD-10-PCS | Mod: S$PBB,,, | Performed by: PHYSICIAN ASSISTANT

## 2019-07-05 RX ADMIN — Medication 20 MG: at 09:07

## 2019-07-05 NOTE — PROGRESS NOTES
Carisa Curry comes in today for the first Euflexxa injection in the bilateral knees.  We have again discussed risks and benefits of the injection.  She wishes to proceed and will notify the office with any questions or concerned.  I will see him/her back in the office next week as scheduled.  She verbalizes understanding and agrees.    Right Knee Euflexxa #1 Injection Report:  After verbal consent was obtained for right knee injection, patient ID, site, and side were verified.  The  right  knee was sterilly prepped in the standard fashion.  A 22-gauge needle was introduced into right knee joint from an fantasma-lateral site without complication and was then injected with one pre filled syringe of Euflexxa.  A sterile bandaid was applied.  The patient was informed to apply an ice pack approximately 10min once arriving home and not to do anything strenuous for 24hours.  She was instructed to call if there were any problems. The patient was discharged in stable condition.    Left Knee Euflexxa #1 Injection Report:  After verbal consent was obtained for left knee injection, patient ID, site, and side were verified.  The left  knee was sterilly prepped in the standard fashion.  A 22-gauge needle was introduced into left knee joint from an fantasma-lateral site without complication and was then injected with one pre filled syringe of Euflexxa.  A sterile bandaid was applied.  The patient was informed to apply an ice pack approximately 10min once arriving home and not to do anything strenuous for 24hours.  She was instructed to call if there were any problems. The patient was discharged in stable condition.

## 2019-07-12 ENCOUNTER — OFFICE VISIT (OUTPATIENT)
Dept: ORTHOPEDICS | Facility: CLINIC | Age: 64
End: 2019-07-12
Payer: MEDICARE

## 2019-07-12 VITALS
HEIGHT: 63 IN | WEIGHT: 255.5 LBS | BODY MASS INDEX: 45.27 KG/M2 | SYSTOLIC BLOOD PRESSURE: 157 MMHG | HEART RATE: 76 BPM | DIASTOLIC BLOOD PRESSURE: 86 MMHG

## 2019-07-12 DIAGNOSIS — M17.11 PRIMARY OSTEOARTHRITIS OF RIGHT KNEE: ICD-10-CM

## 2019-07-12 DIAGNOSIS — M17.12 PRIMARY OSTEOARTHRITIS OF LEFT KNEE: ICD-10-CM

## 2019-07-12 PROCEDURE — 99999 PR PBB SHADOW E&M-EST. PATIENT-LVL IV: ICD-10-PCS | Mod: PBBFAC,,, | Performed by: PHYSICIAN ASSISTANT

## 2019-07-12 PROCEDURE — 99499 NO LOS: ICD-10-PCS | Mod: S$PBB,,, | Performed by: PHYSICIAN ASSISTANT

## 2019-07-12 PROCEDURE — 20610 DRAIN/INJ JOINT/BURSA W/O US: CPT | Mod: 50,S$PBB,, | Performed by: PHYSICIAN ASSISTANT

## 2019-07-12 PROCEDURE — 99499 UNLISTED E&M SERVICE: CPT | Mod: S$PBB,,, | Performed by: PHYSICIAN ASSISTANT

## 2019-07-12 PROCEDURE — 99214 OFFICE O/P EST MOD 30 MIN: CPT | Mod: PBBFAC | Performed by: PHYSICIAN ASSISTANT

## 2019-07-12 PROCEDURE — 20610 DRAIN/INJ JOINT/BURSA W/O US: CPT | Mod: 50,PBBFAC | Performed by: PHYSICIAN ASSISTANT

## 2019-07-12 PROCEDURE — 99999 PR PBB SHADOW E&M-EST. PATIENT-LVL IV: CPT | Mod: PBBFAC,,, | Performed by: PHYSICIAN ASSISTANT

## 2019-07-12 PROCEDURE — 20610 PR DRAIN/INJECT LARGE JOINT/BURSA: ICD-10-PCS | Mod: 50,S$PBB,, | Performed by: PHYSICIAN ASSISTANT

## 2019-07-12 RX ADMIN — Medication 20 MG: at 12:07

## 2019-07-12 NOTE — PROGRESS NOTES
Carisa Curry comes in today for the third Euflexxa injection in the bilateral knees.  She denies complications from previous injection. We have again discussed risks and benefits of the injection.  She wishes to proceed and will notify the office with any questions or concerned.  I will see him/her back in the office next week as scheduled.  She verbalizes understanding and agrees.    Right Knee Euflexxa #3 Injection Report:  After verbal consent was obtained for right knee injection, patient ID, site, and side were verified.  The  right  knee was sterilly prepped in the standard fashion.  A 22-gauge needle was introduced into right knee joint from an fantasma-lateral site without complication and was then injected with one pre filled syringe of Euflexxa.  A sterile bandaid was applied.  The patient was informed to apply an ice pack approximately 10min once arriving home and not to do anything strenuous for 24hours.  She was instructed to call if there were any problems. The patient was discharged in stable condition.    Left Knee Euflexxa #3 Injection Report:  After verbal consent was obtained for left knee injection, patient ID, site, and side were verified.  The left  knee was sterilly prepped in the standard fashion.  A 22-gauge needle was introduced into left knee joint from an fantasma-lateral site without complication and was then injected with one pre filled syringe of Euflexxa.  A sterile bandaid was applied.  The patient was informed to apply an ice pack approximately 10min once arriving home and not to do anything strenuous for 24hours.  She was instructed to call if there were any problems. The patient was discharged in stable condition.

## 2019-07-19 ENCOUNTER — OFFICE VISIT (OUTPATIENT)
Dept: ORTHOPEDICS | Facility: CLINIC | Age: 64
End: 2019-07-19
Payer: MEDICARE

## 2019-07-19 VITALS
HEIGHT: 63 IN | WEIGHT: 255 LBS | DIASTOLIC BLOOD PRESSURE: 84 MMHG | BODY MASS INDEX: 45.18 KG/M2 | HEART RATE: 76 BPM | SYSTOLIC BLOOD PRESSURE: 174 MMHG

## 2019-07-19 DIAGNOSIS — M17.12 PRIMARY OSTEOARTHRITIS OF LEFT KNEE: ICD-10-CM

## 2019-07-19 DIAGNOSIS — M17.11 PRIMARY OSTEOARTHRITIS OF RIGHT KNEE: ICD-10-CM

## 2019-07-19 PROCEDURE — 99499 UNLISTED E&M SERVICE: CPT | Mod: S$PBB,,, | Performed by: PHYSICIAN ASSISTANT

## 2019-07-19 PROCEDURE — 99499 NO LOS: ICD-10-PCS | Mod: S$PBB,,, | Performed by: PHYSICIAN ASSISTANT

## 2019-07-19 PROCEDURE — 99999 PR PBB SHADOW E&M-EST. PATIENT-LVL IV: ICD-10-PCS | Mod: PBBFAC,,, | Performed by: PHYSICIAN ASSISTANT

## 2019-07-19 PROCEDURE — 20610 DRAIN/INJ JOINT/BURSA W/O US: CPT | Mod: 50,S$PBB,, | Performed by: PHYSICIAN ASSISTANT

## 2019-07-19 PROCEDURE — 20610 PR DRAIN/INJECT LARGE JOINT/BURSA: ICD-10-PCS | Mod: 50,S$PBB,, | Performed by: PHYSICIAN ASSISTANT

## 2019-07-19 PROCEDURE — 99999 PR PBB SHADOW E&M-EST. PATIENT-LVL IV: CPT | Mod: PBBFAC,,, | Performed by: PHYSICIAN ASSISTANT

## 2019-07-19 PROCEDURE — 20610 DRAIN/INJ JOINT/BURSA W/O US: CPT | Mod: 50,PBBFAC | Performed by: PHYSICIAN ASSISTANT

## 2019-07-19 PROCEDURE — 99214 OFFICE O/P EST MOD 30 MIN: CPT | Mod: PBBFAC,25 | Performed by: PHYSICIAN ASSISTANT

## 2019-07-19 RX ADMIN — Medication 20 MG: at 09:07

## 2019-07-19 NOTE — PROGRESS NOTES
Carisa Curry comes in today for the third Euflexxa injection in the bilateral knees.  She denies complications from previous injection. We have again discussed risks and benefits of the injection.  She wishes to proceed and will notify the office with any questions or concerned.  I will see him/her back in 3 months to re-evaluate her progress.  She verbalizes understanding and agrees.    She has been responding quite well to the injections.  Pain level today is 0/10    Right Knee Euflexxa #3 Injection Report:  After verbal consent was obtained for right knee injection, patient ID, site, and side were verified.  The  right  knee was sterilly prepped in the standard fashion.  A 22-gauge needle was introduced into right knee joint from an fantasma-lateral site without complication and was then injected with one pre filled syringe of Euflexxa.  A sterile bandaid was applied.  The patient was informed to apply an ice pack approximately 10min once arriving home and not to do anything strenuous for 24hours.  She was instructed to call if there were any problems. The patient was discharged in stable condition.    Left Knee Euflexxa #3 Injection Report:  After verbal consent was obtained for left knee injection, patient ID, site, and side were verified.  The left  knee was sterilly prepped in the standard fashion.  A 22-gauge needle was introduced into left knee joint from an fantasma-lateral site without complication and was then injected with one pre filled syringe of Euflexxa.  A sterile bandaid was applied.  The patient was informed to apply an ice pack approximately 10min once arriving home and not to do anything strenuous for 24hours.  She was instructed to call if there were any problems. The patient was discharged in stable condition.

## 2019-07-22 RX ORDER — METFORMIN HYDROCHLORIDE 1000 MG/1
1000 TABLET ORAL 2 TIMES DAILY WITH MEALS
Qty: 180 TABLET | Refills: 0 | Status: SHIPPED | OUTPATIENT
Start: 2019-07-22 | End: 2019-10-17 | Stop reason: SDUPTHER

## 2019-08-14 DIAGNOSIS — E87.6 DIURETIC-INDUCED HYPOKALEMIA: ICD-10-CM

## 2019-08-14 DIAGNOSIS — T50.2X5A DIURETIC-INDUCED HYPOKALEMIA: ICD-10-CM

## 2019-08-14 DIAGNOSIS — I10 HYPERTENSION GOAL BP (BLOOD PRESSURE) < 130/80: ICD-10-CM

## 2019-08-14 DIAGNOSIS — E78.5 HYPERLIPIDEMIA LDL GOAL <70: ICD-10-CM

## 2019-08-14 RX ORDER — POTASSIUM CHLORIDE 750 MG/1
10 TABLET, EXTENDED RELEASE ORAL DAILY
Qty: 90 TABLET | Refills: 0 | Status: SHIPPED | OUTPATIENT
Start: 2019-08-14 | End: 2019-12-04 | Stop reason: SDUPTHER

## 2019-08-14 RX ORDER — METOPROLOL SUCCINATE 25 MG/1
25 TABLET, EXTENDED RELEASE ORAL DAILY
Qty: 90 TABLET | Refills: 0 | Status: SHIPPED | OUTPATIENT
Start: 2019-08-14 | End: 2019-11-25 | Stop reason: SDUPTHER

## 2019-08-14 RX ORDER — PRAVASTATIN SODIUM 40 MG/1
40 TABLET ORAL DAILY
Qty: 90 TABLET | Refills: 0 | Status: SHIPPED | OUTPATIENT
Start: 2019-08-14 | End: 2019-11-25 | Stop reason: SDUPTHER

## 2019-08-15 ENCOUNTER — PATIENT OUTREACH (OUTPATIENT)
Dept: ADMINISTRATIVE | Facility: HOSPITAL | Age: 64
End: 2019-08-15

## 2019-08-27 ENCOUNTER — HOSPITAL ENCOUNTER (OUTPATIENT)
Dept: RADIOLOGY | Facility: HOSPITAL | Age: 64
Discharge: HOME | End: 2019-08-27
Attending: INTERNAL MEDICINE
Payer: MEDICARE

## 2019-08-27 VITALS — HEIGHT: 63 IN | WEIGHT: 255 LBS | BODY MASS INDEX: 45.18 KG/M2

## 2019-08-27 DIAGNOSIS — Z12.39 BREAST CANCER SCREENING: ICD-10-CM

## 2019-08-27 PROCEDURE — 77067 MAMMO DIGITAL SCREENING BILAT WITH CAD: ICD-10-PCS | Mod: 26,,, | Performed by: RADIOLOGY

## 2019-08-27 PROCEDURE — 77067 SCR MAMMO BI INCL CAD: CPT | Mod: 26,,, | Performed by: RADIOLOGY

## 2019-08-27 PROCEDURE — 77067 SCR MAMMO BI INCL CAD: CPT | Mod: TC

## 2019-08-29 ENCOUNTER — TELEPHONE (OUTPATIENT)
Dept: INTERNAL MEDICINE | Facility: CLINIC | Age: 64
End: 2019-08-29

## 2019-08-29 NOTE — TELEPHONE ENCOUNTER
----- Message from Guadalupe Guzman DO sent at 8/28/2019  1:08 PM CDT -----  Notify patient mammogram did not show any significant findings. 1 year follow up is recommended.

## 2019-08-30 ENCOUNTER — PATIENT OUTREACH (OUTPATIENT)
Dept: ADMINISTRATIVE | Facility: HOSPITAL | Age: 64
End: 2019-08-30

## 2019-09-04 ENCOUNTER — OFFICE VISIT (OUTPATIENT)
Dept: INTERNAL MEDICINE | Facility: CLINIC | Age: 64
End: 2019-09-04
Payer: MEDICARE

## 2019-09-04 VITALS
WEIGHT: 244.69 LBS | OXYGEN SATURATION: 95 % | BODY MASS INDEX: 43.36 KG/M2 | HEART RATE: 72 BPM | HEIGHT: 63 IN | TEMPERATURE: 97 F | DIASTOLIC BLOOD PRESSURE: 89 MMHG | SYSTOLIC BLOOD PRESSURE: 136 MMHG

## 2019-09-04 DIAGNOSIS — E66.01 MORBID OBESITY WITH BMI OF 40.0-44.9, ADULT: ICD-10-CM

## 2019-09-04 DIAGNOSIS — Z12.11 COLON CANCER SCREENING: ICD-10-CM

## 2019-09-04 DIAGNOSIS — I10 HYPERTENSION GOAL BP (BLOOD PRESSURE) < 130/80: Primary | Chronic | ICD-10-CM

## 2019-09-04 DIAGNOSIS — E78.5 HYPERLIPIDEMIA LDL GOAL <70: Chronic | ICD-10-CM

## 2019-09-04 DIAGNOSIS — R80.9 CONTROLLED TYPE 2 DIABETES MELLITUS WITH MICROALBUMINURIA, WITHOUT LONG-TERM CURRENT USE OF INSULIN: ICD-10-CM

## 2019-09-04 DIAGNOSIS — E11.29 CONTROLLED TYPE 2 DIABETES MELLITUS WITH MICROALBUMINURIA, WITHOUT LONG-TERM CURRENT USE OF INSULIN: ICD-10-CM

## 2019-09-04 PROCEDURE — 99999 PR PBB SHADOW E&M-EST. PATIENT-LVL III: CPT | Mod: PBBFAC,,, | Performed by: INTERNAL MEDICINE

## 2019-09-04 PROCEDURE — 99214 OFFICE O/P EST MOD 30 MIN: CPT | Mod: S$PBB,,, | Performed by: INTERNAL MEDICINE

## 2019-09-04 PROCEDURE — 99999 PR PBB SHADOW E&M-EST. PATIENT-LVL III: ICD-10-PCS | Mod: PBBFAC,,, | Performed by: INTERNAL MEDICINE

## 2019-09-04 PROCEDURE — 99213 OFFICE O/P EST LOW 20 MIN: CPT | Mod: PBBFAC | Performed by: INTERNAL MEDICINE

## 2019-09-04 PROCEDURE — 99214 PR OFFICE/OUTPT VISIT, EST, LEVL IV, 30-39 MIN: ICD-10-PCS | Mod: S$PBB,,, | Performed by: INTERNAL MEDICINE

## 2019-09-04 RX ORDER — AMLODIPINE BESYLATE 10 MG/1
10 TABLET ORAL DAILY
Qty: 90 TABLET | Refills: 1 | Status: SHIPPED | OUTPATIENT
Start: 2019-09-04 | End: 2019-12-04 | Stop reason: SDUPTHER

## 2019-09-04 RX ORDER — LOSARTAN POTASSIUM AND HYDROCHLOROTHIAZIDE 25; 100 MG/1; MG/1
1 TABLET ORAL DAILY
Qty: 90 TABLET | Refills: 1 | Status: SHIPPED | OUTPATIENT
Start: 2019-09-04 | End: 2019-11-25 | Stop reason: SDUPTHER

## 2019-09-04 NOTE — PROGRESS NOTES
"Subjective:       Patient ID: Carisa Curry is a 64 y.o. female.    Chief Complaint: 3 month f/u    Carisa Crury  64 y.o. Black or  female    Patient presents with:  3 month f/u    HPI: Here today to follow up on chronic conditions. She is here with her care provider.   HTN--compliant with amlodipine, losartan-HCTZ and metoprolol. Her b/p is elevated in the clinic but she gets better readings at home.   HLD--compliant with pravastatin.                    CHOL                     137                 08/27/2019                 HDL                      44                  08/27/2019                 LDLCALC                  76.8                08/27/2019                 TRIG                     81                  08/27/2019            Diabetes--improved. She is compliant twith metformin and Trulicity. She is working on losing weight.                      HGBA1C                   6.6 (H)             08/27/2019            Microalbuminuria--compliant with losartan.     Past Medical History:  Arthritis  CHF (congestive heart failure)  Diabetes mellitus, type 2  Diabetic retinopathy  Hyperlipidemia  Hypertension  Schizophrenia  Seizures      Comment:  reported per pt and sister, nothing since childhood    Current Outpatient Medications on File Prior to Visit:  blood sugar diagnostic Strp, Twice daily glucose testing., Disp: 60 strip, Rfl: 6  blood-glucose meter kit, Use as instructed, Disp: 1 each, Rfl: 0  insulin needles, disposable, (PEN NEEDLE) 31 X 5/16 " Ndle, Once a day insulin injection., Disp: 30 each, Rfl: 6  lancets Misc, Twice daily glucose monitoring., Disp: 60 each, Rfl: 6  meloxicam (MOBIC) 15 MG tablet, Take 1 tablet (15 mg total) by mouth once daily. Take with food.  Discontinue if you develop GI side effects., Disp: 30 tablet, Rfl: 1  metFORMIN (GLUCOPHAGE) 1000 MG tablet, Take 1 tablet (1,000 mg total) by mouth 2 (two) times daily with meals., Disp: 180 tablet, Rfl: " 0  metoprolol succinate (TOPROL-XL) 25 MG 24 hr tablet, Take 1 tablet (25 mg total) by mouth once daily., Disp: 90 tablet, Rfl: 0  potassium chloride (KLOR-CON) 10 MEQ TbSR, Take 1 tablet (10 mEq total) by mouth once daily., Disp: 90 tablet, Rfl: 0  pravastatin (PRAVACHOL) 40 MG tablet, Take 1 tablet (40 mg total) by mouth once daily., Disp: 90 tablet, Rfl: 0  amLODIPine (NORVASC) 10 MG tablet, Take 1 tablet (10 mg total) by mouth once daily., Disp: 90 tablet, Rfl: 1  dulaglutide (TRULICITY) 1.5 mg/0.5 mL PnIj, Inject 1.5 mg into the skin every 7 days., Disp: 12 mL, Rfl: 1  losartan-hydrochlorothiazide 100-25 mg (HYZAAR) 100-25 mg per tablet, Take 1 tablet by mouth once daily., Disp: 90 tablet, Rfl: 1    Allergies:  Review of patient's allergies indicates:  No Known Allergies      Review of Systems   Constitutional: Negative for fever and unexpected weight change.   Respiratory: Negative for shortness of breath.    Cardiovascular: Negative for chest pain and leg swelling.   Gastrointestinal: Negative for abdominal pain.   Genitourinary: Negative for difficulty urinating.   Musculoskeletal: Negative for gait problem.   Neurological: Negative for dizziness and headaches.       Objective:      Physical Exam   Constitutional: She is oriented to person, place, and time. She appears well-developed and well-nourished. No distress.   Eyes: No scleral icterus.   Neck: No tracheal deviation present.   Cardiovascular: Normal rate, regular rhythm and normal heart sounds.   Pulmonary/Chest: Effort normal and breath sounds normal. No respiratory distress. She has no wheezes. She has no rales.   Abdominal: Soft. Bowel sounds are normal.   Musculoskeletal: She exhibits no edema.   Neurological: She is alert and oriented to person, place, and time.   Skin: Skin is warm and dry.   Psychiatric: She has a normal mood and affect.   Vitals reviewed.      Assessment:       1. Hypertension goal BP (blood pressure) < 130/80    2.  Hyperlipidemia LDL goal <70    3. Controlled type 2 diabetes mellitus with microalbuminuria, without long-term current use of insulin    4. Morbid obesity with BMI of 40.0-44.9, adult    5. Colon cancer screening        Plan:       Carisa was seen today for 3 month f/u.    Diagnoses and all orders for this visit:    Hypertension goal BP (blood pressure) < 130/80  -     amLODIPine (NORVASC) 10 MG tablet; Take 1 tablet (10 mg total) by mouth once daily.  -     losartan-hydrochlorothiazide 100-25 mg (HYZAAR) 100-25 mg per tablet; Take 1 tablet by mouth once daily.  -     Continue metoprolol  -     Continue home b/p monitoring     Hyperlipidemia LDL goal <70  -     Continue pravastatin    Controlled type 2 diabetes mellitus with microalbuminuria, without long-term current use of insulin  -     Refill dulaglutide (TRULICITY) 1.5 mg/0.5 mL PnIj; Inject 1.5 mg into the skin every 7 days.  -     Continue metformin   -     Continue losartan  -     Avoid NSAID's   -     Microalbumin/creatinine urine ratio; Standing    Morbid obesity with BMI of 40.0-44.9, adult  -     Lifestyle modifications discussed    Colon cancer screening  -     Fecal Immunochemical Test (iFOBT); Future    Labs and f/u in 3 months.

## 2019-09-18 ENCOUNTER — TELEPHONE (OUTPATIENT)
Dept: INTERNAL MEDICINE | Facility: CLINIC | Age: 64
End: 2019-09-18

## 2019-09-20 ENCOUNTER — INITIAL CONSULT (OUTPATIENT)
Dept: PSYCHIATRY | Facility: CLINIC | Age: 64
End: 2019-09-20
Payer: MEDICARE

## 2019-09-20 VITALS
SYSTOLIC BLOOD PRESSURE: 185 MMHG | HEART RATE: 74 BPM | DIASTOLIC BLOOD PRESSURE: 86 MMHG | WEIGHT: 244.5 LBS | BODY MASS INDEX: 43.31 KG/M2

## 2019-09-20 DIAGNOSIS — F20.9 SCHIZOPHRENIA, UNSPECIFIED TYPE: Primary | ICD-10-CM

## 2019-09-20 PROCEDURE — 90792 PR PSYCHIATRIC DIAGNOSTIC EVALUATION W/MEDICAL SERVICES: ICD-10-PCS | Mod: ,,, | Performed by: PSYCHIATRY & NEUROLOGY

## 2019-09-20 PROCEDURE — 99999 PR PBB SHADOW E&M-EST. PATIENT-LVL II: CPT | Mod: PBBFAC,,, | Performed by: PSYCHIATRY & NEUROLOGY

## 2019-09-20 PROCEDURE — 99212 OFFICE O/P EST SF 10 MIN: CPT | Mod: PBBFAC | Performed by: PSYCHIATRY & NEUROLOGY

## 2019-09-20 PROCEDURE — 99999 PR PBB SHADOW E&M-EST. PATIENT-LVL II: ICD-10-PCS | Mod: PBBFAC,,, | Performed by: PSYCHIATRY & NEUROLOGY

## 2019-09-20 PROCEDURE — 90792 PSYCH DIAG EVAL W/MED SRVCS: CPT | Mod: ,,, | Performed by: PSYCHIATRY & NEUROLOGY

## 2019-09-20 RX ORDER — ARIPIPRAZOLE 10 MG/1
10 TABLET ORAL DAILY
Qty: 30 TABLET | Refills: 2 | Status: SHIPPED | OUTPATIENT
Start: 2019-09-20 | End: 2019-09-25 | Stop reason: SDUPTHER
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
## 2019-09-25 RX ORDER — ARIPIPRAZOLE 10 MG/1
10 TABLET ORAL DAILY
Qty: 30 TABLET | Refills: 0 | Status: SHIPPED | OUTPATIENT
Start: 2019-09-25 | End: 2019-10-29

## 2019-10-02 ENCOUNTER — TELEPHONE (OUTPATIENT)
Dept: PHARMACY | Facility: CLINIC | Age: 64
End: 2019-10-02

## 2019-10-02 NOTE — TELEPHONE ENCOUNTER
Good Morning Ms. Younger!  This is Alejandra with [facility].  The prior authorization for your generic Abilify prescription called in by Dr. Piper has been approved by your insurance, resulting in a copayment of $0.00.    We will call you when it is ready, so that you only have to make one trip here.    PA Information:  [employer]  [phone]  Pa approved for Aripiprazole 10mg tablets #30 per 30 days   pa case id # [id number]  pa approval dates: 9/2/19-10/1/2020    Thank You!   Alejandra Maloney CPhT, B.A  Patient Care Advocate   [facility]  [email or web address]  Phone: [phone] Ext [phone]  Fax: [phone]

## 2019-10-17 RX ORDER — METFORMIN HYDROCHLORIDE 1000 MG/1
TABLET ORAL
Qty: 180 TABLET | Refills: 0 | Status: SHIPPED | OUTPATIENT
Start: 2019-10-17 | End: 2019-12-04 | Stop reason: SDUPTHER

## 2019-10-25 ENCOUNTER — OFFICE VISIT (OUTPATIENT)
Dept: ORTHOPEDICS | Facility: CLINIC | Age: 64
End: 2019-10-25
Payer: MEDICARE

## 2019-10-25 VITALS
SYSTOLIC BLOOD PRESSURE: 153 MMHG | WEIGHT: 244.5 LBS | BODY MASS INDEX: 43.32 KG/M2 | DIASTOLIC BLOOD PRESSURE: 70 MMHG | HEART RATE: 87 BPM | HEIGHT: 63 IN

## 2019-10-25 DIAGNOSIS — M25.562 CHRONIC PAIN OF LEFT KNEE: ICD-10-CM

## 2019-10-25 DIAGNOSIS — E11.29 CONTROLLED TYPE 2 DIABETES MELLITUS WITH MICROALBUMINURIA, WITHOUT LONG-TERM CURRENT USE OF INSULIN: Chronic | ICD-10-CM

## 2019-10-25 DIAGNOSIS — G89.29 CHRONIC PAIN OF RIGHT KNEE: ICD-10-CM

## 2019-10-25 DIAGNOSIS — M25.561 CHRONIC PAIN OF RIGHT KNEE: ICD-10-CM

## 2019-10-25 DIAGNOSIS — R80.9 CONTROLLED TYPE 2 DIABETES MELLITUS WITH MICROALBUMINURIA, WITHOUT LONG-TERM CURRENT USE OF INSULIN: Chronic | ICD-10-CM

## 2019-10-25 DIAGNOSIS — M17.11 PRIMARY OSTEOARTHRITIS OF RIGHT KNEE: ICD-10-CM

## 2019-10-25 DIAGNOSIS — M17.12 PRIMARY OSTEOARTHRITIS OF LEFT KNEE: Primary | ICD-10-CM

## 2019-10-25 DIAGNOSIS — G89.29 CHRONIC PAIN OF LEFT KNEE: ICD-10-CM

## 2019-10-25 PROCEDURE — 99999 PR PBB SHADOW E&M-EST. PATIENT-LVL V: CPT | Mod: PBBFAC,,, | Performed by: PHYSICIAN ASSISTANT

## 2019-10-25 PROCEDURE — 99214 OFFICE O/P EST MOD 30 MIN: CPT | Mod: 25,S$PBB,, | Performed by: PHYSICIAN ASSISTANT

## 2019-10-25 PROCEDURE — 20610 PR DRAIN/INJECT LARGE JOINT/BURSA: ICD-10-PCS | Mod: 50,S$PBB,, | Performed by: PHYSICIAN ASSISTANT

## 2019-10-25 PROCEDURE — 99215 OFFICE O/P EST HI 40 MIN: CPT | Mod: PBBFAC | Performed by: PHYSICIAN ASSISTANT

## 2019-10-25 PROCEDURE — 20610 DRAIN/INJ JOINT/BURSA W/O US: CPT | Mod: 50,S$PBB,, | Performed by: PHYSICIAN ASSISTANT

## 2019-10-25 PROCEDURE — 20610 DRAIN/INJ JOINT/BURSA W/O US: CPT | Mod: 50,PBBFAC | Performed by: PHYSICIAN ASSISTANT

## 2019-10-25 PROCEDURE — 99214 PR OFFICE/OUTPT VISIT, EST, LEVL IV, 30-39 MIN: ICD-10-PCS | Mod: 25,S$PBB,, | Performed by: PHYSICIAN ASSISTANT

## 2019-10-25 PROCEDURE — 99999 PR PBB SHADOW E&M-EST. PATIENT-LVL V: ICD-10-PCS | Mod: PBBFAC,,, | Performed by: PHYSICIAN ASSISTANT

## 2019-10-25 RX ORDER — METHYLPREDNISOLONE ACETATE 80 MG/ML
80 INJECTION, SUSPENSION INTRA-ARTICULAR; INTRALESIONAL; INTRAMUSCULAR; SOFT TISSUE ONCE
Status: COMPLETED | OUTPATIENT
Start: 2019-10-25 | End: 2019-10-25

## 2019-10-25 RX ADMIN — METHYLPREDNISOLONE ACETATE 80 MG: 80 INJECTION, SUSPENSION INTRALESIONAL; INTRAMUSCULAR; INTRASYNOVIAL; SOFT TISSUE at 10:10

## 2019-10-25 NOTE — PROGRESS NOTES
Patient ID: Carisa Curry is a 64 y.o. female.    Chief Complaint: Pain of the Left Knee and Pain of the Right Knee      HPI: Carisa Curry  is a 64 y.o. female who c/o Pain of the Left Knee and Pain of the Right Knee   for duration of years.  I saw her previously and did a corticosteroid injection in both knees.  I followed that with Euflexxa injections approximately 3 months ago.  She comes in today for routine follow-up.  She complains of 10/10 pain at nighttime as well as with weight-bearing.  However, she cites some improvement in her symptoms.  Quality is intermittent aching pain.  Alleviating factors include corticosteroid injections and Euflexxa injections as well as rest.  Aggravating factors include getting up after prolonged inactivity, prolonged weight-bearing, full range of motion, nighttime.  She complains of start-up pain. She uses it quad post cane for ambulation.  She is not interested in any sort of surgical intervention.    Past Medical History:   Diagnosis Date    Arthritis     CHF (congestive heart failure)     Diabetes mellitus, type 2     Diabetic retinopathy     Hyperlipidemia     Hypertension     Schizophrenia     Seizures     reported per pt and sister, nothing since childhood     Past Surgical History:   Procedure Laterality Date    EYE SURGERY      SPINE SURGERY       Family History   Problem Relation Age of Onset    Kidney disease Mother     Heart failure Mother     Cataracts Mother     Hypertension Mother     Heart disease Mother     Stroke Mother     Cancer Father         brain    Diabetes Father     Diabetes Maternal Aunt     Hypertension Maternal Aunt     Diabetes Paternal Aunt     Hypertension Paternal Aunt     Heart disease Paternal Aunt     Diabetes Paternal Uncle     Heart disease Paternal Uncle      Social History     Socioeconomic History    Marital status: Single     Spouse name: Not on file    Number of children: Not on file     "Years of education: Not on file    Highest education level: Not on file   Occupational History    Not on file   Social Needs    Financial resource strain: Not on file    Food insecurity:     Worry: Not on file     Inability: Not on file    Transportation needs:     Medical: Not on file     Non-medical: Not on file   Tobacco Use    Smoking status: Never Smoker    Smokeless tobacco: Never Used   Substance and Sexual Activity    Alcohol use: No     Alcohol/week: 0.0 standard drinks    Drug use: No    Sexual activity: Never   Lifestyle    Physical activity:     Days per week: Not on file     Minutes per session: Not on file    Stress: Not on file   Relationships    Social connections:     Talks on phone: Not on file     Gets together: Not on file     Attends Zoroastrianism service: Not on file     Active member of club or organization: Not on file     Attends meetings of clubs or organizations: Not on file     Relationship status: Not on file   Other Topics Concern    Not on file   Social History Narrative    Not on file     Medication List with Changes/Refills   Current Medications    AMLODIPINE (NORVASC) 10 MG TABLET    Take 1 tablet (10 mg total) by mouth once daily.    ARIPIPRAZOLE (ABILIFY) 10 MG TAB    Take 1 tablet (10 mg total) by mouth once daily.    BLOOD SUGAR DIAGNOSTIC STRP    Twice daily glucose testing.    BLOOD-GLUCOSE METER KIT    Use as instructed    DULAGLUTIDE (TRULICITY) 1.5 MG/0.5 ML PNIJ    Inject 1.5 mg into the skin every 7 days.    INSULIN NEEDLES, DISPOSABLE, (PEN NEEDLE) 31 X 5/16 " NDLE    Once a day insulin injection.    LANCETS MISC    Twice daily glucose monitoring.    LOSARTAN-HYDROCHLOROTHIAZIDE 100-25 MG (HYZAAR) 100-25 MG PER TABLET    Take 1 tablet by mouth once daily.    MELOXICAM (MOBIC) 15 MG TABLET    Take 1 tablet (15 mg total) by mouth once daily. Take with food.  Discontinue if you develop GI side effects.    METFORMIN (GLUCOPHAGE) 1000 MG TABLET    Take 1 tablet by " mouth two times daily with meals.    METOPROLOL SUCCINATE (TOPROL-XL) 25 MG 24 HR TABLET    Take 1 tablet (25 mg total) by mouth once daily.    POTASSIUM CHLORIDE (KLOR-CON) 10 MEQ TBSR    Take 1 tablet (10 mEq total) by mouth once daily.    PRAVASTATIN (PRAVACHOL) 40 MG TABLET    Take 1 tablet (40 mg total) by mouth once daily.     Review of patient's allergies indicates:  No Known Allergies        Objective:        General    Nursing note and vitals reviewed.  Constitutional: She is oriented to person, place, and time. She appears well-developed and well-nourished.   HENT:   Head: Normocephalic and atraumatic.   Eyes: EOM are normal.   Cardiovascular: Normal rate and regular rhythm.    Pulmonary/Chest: Effort normal.   Abdominal: Soft.   Neurological: She is alert and oriented to person, place, and time.   Psychiatric: She has a normal mood and affect. Her behavior is normal.           Right Knee Exam     Inspection   Erythema: absent  Swelling: absent  Effusion: absent  Deformity: present (varus)  Bruising: absent    Tenderness   The patient is tender to palpation of the medial joint line and condyle.    Crepitus   The patient has crepitus of the patella and medial joint line.    Range of Motion   Extension: normal   Flexion:  90 abnormal     Tests   Meniscus   Jennifer:  Medial - negative Lateral - negative  Ligament Examination Lachman: normal (-1 to 2mm) PCL-Posterior Drawer: normal (0 to 2mm)     MCL - Valgus: abnormal - grade I  LCL - Varus: normal  Patella   Patellar apprehension: negative  Passive Patellar Tilt: neutral  Patellar Grind: positive    Other   Meniscal Cyst: absent  Popliteal (Baker's) Cyst: absent  Sensation: normal    Left Knee Exam     Inspection   Erythema: absent  Swelling: absent  Effusion: absent  Deformity: present (varus)  Bruising: absent    Tenderness   The patient tender to palpation of the condyle and medial joint line.    Crepitus   The patient has crepitus of the patella and  medial joint line.    Range of Motion   Extension: normal   Flexion:  90 abnormal     Tests   Meniscus   Jennifer:  Medial - negative Lateral - negative  Stability Lachman: normal (-1 to 2mm) PCL-Posterior Drawer: normal (0 to 2mm)  MCL - Valgus: abnormal - grade I  LCL - Varus: normal (0 to 2mm)  Patella   Patellar apprehension: negative  Passive Patellar Tilt: neutral  Patellar Grind: positive    Other   Meniscal Cyst: absent  Popliteal (Baker's) Cyst: absent  Sensation: normal    Right Hip Exam     Tests   Juan M: negative  Left Hip Exam     Tests   Juan M: negative          Muscle Strength   Right Lower Extremity   Quadriceps:  4/5   Hamstrin/5   Left Lower Extremity   Quadriceps:  4/5   Hamstrin/5     Vascular Exam       Edema  Right Lower Leg: absent  Left Lower Leg: absent              Assessment:       Encounter Diagnoses   Name Primary?    Primary osteoarthritis of left knee Yes    Primary osteoarthritis of right knee     Chronic pain of left knee     Chronic pain of right knee     Controlled type 2 diabetes mellitus with microalbuminuria, without long-term current use of insulin           Plan:       Carisa was seen today for pain and pain.    Diagnoses and all orders for this visit:    Primary osteoarthritis of left knee  -     methylPREDNISolone acetate injection 80 mg  -     Ambulatory referral to Pain Clinic  -     Cancel: Ambulatory Referral to Physical/Occupational Therapy  -     Ambulatory Referral to Physical/Occupational Therapy    Primary osteoarthritis of right knee  -     Ambulatory referral to Pain Clinic  -     Cancel: Ambulatory Referral to Physical/Occupational Therapy  -     methylPREDNISolone acetate injection 80 mg  -     Ambulatory Referral to Physical/Occupational Therapy    Chronic pain of left knee  -     methylPREDNISolone acetate injection 80 mg  -     Ambulatory referral to Pain Clinic  -     Cancel: Ambulatory Referral to Physical/Occupational Therapy  -     Ambulatory  Referral to Physical/Occupational Therapy    Chronic pain of right knee  -     Ambulatory referral to Pain Clinic  -     Cancel: Ambulatory Referral to Physical/Occupational Therapy  -     methylPREDNISolone acetate injection 80 mg  -     Ambulatory Referral to Physical/Occupational Therapy    Controlled type 2 diabetes mellitus with microalbuminuria, without long-term current use of insulin        Carisa Abreu Patricio is an established pt here for f/u on above s/p Euflexxa injections.  We had a long discussion today regarding degenerative arthritis in the knees. The patient understands that arthritis is chronic and will worsen over time.  The patient also understands that arthritis may cause episodic flare-ups in pain. Management or if arthritis is achieved through a multi-modal approach including weight loss in obese individuals, activity modification, NSAIDs (topical vs oral) where appropriate, periodic intra-articular steroid injections, viscosupplementation, physical therapy, knee bracing, ambulatory aids, as well as geniculate nerve blocks.      After discussion of risks and benefits of all of the above were discussed, the decision was made to move forward with CSI Bilat knees today with referral to IPM to consider geniculate nerve blocks.  I have also sent a referral to physical therapy for bilateral lower extremity strengthening.  She will monitor her blood sugars following injections today. She did okay with them last time.  She has any difficulty managing them, she will notify primary care.  She verbalizes understanding and agrees.  She is in no way interested in total knee arthroplasties.      Follow up for IPM consult for MARTHA.        Left Knee Injection Report:  After verbal consent was obtained for left knee injection, patient ID, site, and side were verified.  The  left  knee was sterilly prepped in the standard fashion.  A 22-gauge needle was introduced into left knee joint from an fantasma-lateral  site without complication. The left knee was then injected with 20 mg lidocaine plain and 80 mg depomedrol.  A sterile bandaid was applied.  The patient was informed to apply an ice pack approximately 10min once arriving home and not to do anything strenuous for 24hours.  She was instructed to call if there were any problems. The patient was discharged in stable condition.    Right Knee Injection Report:  After verbal consent was obtained for right knee injection, patient ID, site, and side were verified.  The  right  knee was sterilly prepped in the standard fashion.  A 22-gauge needle was introduced into right knee joint from an fantasma-lateral site without complication. The right knee was then injected with 20 mg lidocaine plain and 80 mg depomedrol.  A sterile bandaid was applied.  The patient was informed to apply an ice pack approximately 10min once arriving home and not to do anything strenuous for 24hours.  She was instructed to call if there were any problems. The patient was discharged in stable condition    The patient understands, chooses and consents to this plan and accepts all   the risks which include but are not limited to the risks mentioned above.     Disclaimer: This note was prepared using a voice recognition system and is likely to have sound alike errors within the text.

## 2019-10-29 RX ORDER — ARIPIPRAZOLE 10 MG/1
10 TABLET ORAL DAILY
Qty: 30 TABLET | Refills: 1 | Status: SHIPPED | OUTPATIENT
Start: 2019-10-29 | End: 2019-11-25 | Stop reason: SDUPTHER

## 2019-11-08 DIAGNOSIS — T50.2X5A DIURETIC-INDUCED HYPOKALEMIA: ICD-10-CM

## 2019-11-08 DIAGNOSIS — E78.5 HYPERLIPIDEMIA LDL GOAL <70: ICD-10-CM

## 2019-11-08 DIAGNOSIS — E87.6 DIURETIC-INDUCED HYPOKALEMIA: ICD-10-CM

## 2019-11-08 DIAGNOSIS — I10 HYPERTENSION GOAL BP (BLOOD PRESSURE) < 130/80: ICD-10-CM

## 2019-11-08 RX ORDER — PRAVASTATIN SODIUM 40 MG/1
TABLET ORAL
Qty: 90 TABLET | Refills: 0 | OUTPATIENT
Start: 2019-11-08

## 2019-11-08 RX ORDER — METOPROLOL SUCCINATE 25 MG/1
TABLET, EXTENDED RELEASE ORAL
Qty: 90 TABLET | Refills: 0 | OUTPATIENT
Start: 2019-11-08

## 2019-11-08 RX ORDER — POTASSIUM CHLORIDE 750 MG/1
TABLET, EXTENDED RELEASE ORAL
Qty: 90 TABLET | Refills: 0 | OUTPATIENT
Start: 2019-11-08

## 2019-11-21 DIAGNOSIS — E78.5 HYPERLIPIDEMIA LDL GOAL <70: ICD-10-CM

## 2019-11-21 DIAGNOSIS — I10 HYPERTENSION GOAL BP (BLOOD PRESSURE) < 130/80: ICD-10-CM

## 2019-11-21 RX ORDER — PRAVASTATIN SODIUM 40 MG/1
TABLET ORAL
Qty: 90 TABLET | Refills: 0 | OUTPATIENT
Start: 2019-11-21

## 2019-11-21 RX ORDER — METOPROLOL SUCCINATE 25 MG/1
TABLET, EXTENDED RELEASE ORAL
Qty: 90 TABLET | Refills: 0 | OUTPATIENT
Start: 2019-11-21

## 2019-11-21 NOTE — TELEPHONE ENCOUNTER
----- Message from Monika Araiza sent at 11/21/2019 11:05 AM CST -----  Contact: [phone]  Would like to consult with the nurse,  Patient states the pharmacy had been faxing concerning  Her Medication, Patient states that she is out of her Medication and needs this refill Today, Patient would like to speak with the nurse concerning this,  Please call back at [phone], Thanks sj

## 2019-11-25 DIAGNOSIS — E78.5 HYPERLIPIDEMIA LDL GOAL <70: ICD-10-CM

## 2019-11-25 DIAGNOSIS — I10 HYPERTENSION GOAL BP (BLOOD PRESSURE) < 130/80: Chronic | ICD-10-CM

## 2019-11-25 RX ORDER — LOSARTAN POTASSIUM AND HYDROCHLOROTHIAZIDE 25; 100 MG/1; MG/1
1 TABLET ORAL DAILY
Qty: 90 TABLET | Refills: 1 | Status: SHIPPED | OUTPATIENT
Start: 2019-11-25 | End: 2019-11-26 | Stop reason: RX

## 2019-11-25 RX ORDER — METOPROLOL SUCCINATE 25 MG/1
25 TABLET, EXTENDED RELEASE ORAL DAILY
Qty: 90 TABLET | Refills: 1 | Status: SHIPPED | OUTPATIENT
Start: 2019-11-25 | End: 2020-03-11

## 2019-11-25 RX ORDER — PRAVASTATIN SODIUM 40 MG/1
40 TABLET ORAL DAILY
Qty: 90 TABLET | Refills: 1 | Status: SHIPPED | OUTPATIENT
Start: 2019-11-25 | End: 2020-03-11

## 2019-11-25 RX ORDER — ARIPIPRAZOLE 10 MG/1
10 TABLET ORAL DAILY
Qty: 30 TABLET | Refills: 0 | Status: SHIPPED | OUTPATIENT
Start: 2019-11-25 | End: 2019-12-19 | Stop reason: SDUPTHER

## 2019-11-25 NOTE — TELEPHONE ENCOUNTER
Received Fax from Pharmacy requesting medication refills losartan potassium, 100mg/ HCTZ 25mg please separate the meds and send refill , pt is at pharmacy.

## 2019-11-25 NOTE — TELEPHONE ENCOUNTER
----- Message from Erica Rebolledo sent at 11/25/2019  1:42 PM CST -----  Contact: pt  Please call pt @ [phone] regarding medication, pt states she call last Wednesday, pt also call this morning, pt states she need today.    Type:  RX Refill Request    Who Called: Patinet  Refill or New Rx: Refill  RX Name and Strength:Pravastatin sod 40mg, Losartan Potassium 100mg-25mg, Metoprolol   How is the patient currently taking it? (ex. 1XDay):na  Is this a 30 day or 90 day RX:30  Preferred Pharmacy with phone number:[facility]  Local or Mail Order:Local  Ordering Provider:Dr Guzman  Would the patient rather a call back or a response via [facility]? Call back  Best Call Back Number:[phone]  Additional Information: na

## 2019-11-25 NOTE — TELEPHONE ENCOUNTER
----- Message from Maricruz Crawley sent at 11/25/2019  9:12 AM CST -----  Contact: pt  Pt is at the pharmacy. Pt presciption she called in last week is not ready that was called in last Wednesday.  She has been out of her prescription for 2 weeks. Please call pt when its been called in.

## 2019-11-26 RX ORDER — LOSARTAN POTASSIUM 100 MG/1
100 TABLET ORAL DAILY
Qty: 30 TABLET | Refills: 3 | Status: SHIPPED | OUTPATIENT
Start: 2019-11-26 | End: 2020-03-11

## 2019-11-26 RX ORDER — HYDROCHLOROTHIAZIDE 25 MG/1
25 TABLET ORAL DAILY
Qty: 30 TABLET | Refills: 3 | Status: SHIPPED | OUTPATIENT
Start: 2019-11-26 | End: 2020-04-13

## 2019-11-27 ENCOUNTER — LAB VISIT (OUTPATIENT)
Dept: LAB | Facility: HOSPITAL | Age: 64
End: 2019-11-27
Attending: PHYSICIAN ASSISTANT
Payer: MEDICARE

## 2019-11-27 DIAGNOSIS — I10 HYPERTENSION GOAL BP (BLOOD PRESSURE) < 130/80: ICD-10-CM

## 2019-11-27 DIAGNOSIS — E78.5 HYPERLIPIDEMIA LDL GOAL <70: ICD-10-CM

## 2019-11-27 LAB
ALBUMIN SERPL BCP-MCNC: 3.9 G/DL (ref 3.5–5.2)
ALP SERPL-CCNC: 97 U/L (ref 55–135)
ALT SERPL W/O P-5'-P-CCNC: 8 U/L (ref 10–44)
ANION GAP SERPL CALC-SCNC: 12 MMOL/L (ref 8–16)
AST SERPL-CCNC: 12 U/L (ref 10–40)
BILIRUB SERPL-MCNC: 0.5 MG/DL (ref 0.1–1)
BUN SERPL-MCNC: 18 MG/DL (ref 8–23)
CALCIUM SERPL-MCNC: 9.5 MG/DL (ref 8.7–10.5)
CHLORIDE SERPL-SCNC: 102 MMOL/L (ref 95–110)
CHOLEST SERPL-MCNC: 166 MG/DL (ref 120–199)
CHOLEST/HDLC SERPL: 2.7 {RATIO} (ref 2–5)
CO2 SERPL-SCNC: 26 MMOL/L (ref 23–29)
CREAT SERPL-MCNC: 1 MG/DL (ref 0.5–1.4)
EST. GFR  (AFRICAN AMERICAN): >60 ML/MIN/1.73 M^2
EST. GFR  (NON AFRICAN AMERICAN): 59.3 ML/MIN/1.73 M^2
ESTIMATED AVG GLUCOSE: 134 MG/DL (ref 68–131)
GLUCOSE SERPL-MCNC: 124 MG/DL (ref 70–110)
HBA1C MFR BLD HPLC: 6.3 % (ref 4–5.6)
HDLC SERPL-MCNC: 61 MG/DL (ref 40–75)
HDLC SERPL: 36.7 % (ref 20–50)
LDLC SERPL CALC-MCNC: 91.2 MG/DL (ref 63–159)
NONHDLC SERPL-MCNC: 105 MG/DL
POTASSIUM SERPL-SCNC: 3.6 MMOL/L (ref 3.5–5.1)
PROT SERPL-MCNC: 8.2 G/DL (ref 6–8.4)
SODIUM SERPL-SCNC: 140 MMOL/L (ref 136–145)
TRIGL SERPL-MCNC: 69 MG/DL (ref 30–150)

## 2019-11-27 PROCEDURE — 36415 COLL VENOUS BLD VENIPUNCTURE: CPT

## 2019-11-27 PROCEDURE — 80053 COMPREHEN METABOLIC PANEL: CPT

## 2019-11-27 PROCEDURE — 80061 LIPID PANEL: CPT

## 2019-11-27 PROCEDURE — 83036 HEMOGLOBIN GLYCOSYLATED A1C: CPT

## 2019-12-02 ENCOUNTER — TELEPHONE (OUTPATIENT)
Dept: INTERNAL MEDICINE | Facility: CLINIC | Age: 64
End: 2019-12-02

## 2019-12-02 NOTE — TELEPHONE ENCOUNTER
----- Message from Albert Patricia III, PA-C sent at 11/27/2019  2:16 PM CST -----  Bone density scan is abnormal. Please have her follow up with Dr call to discuss

## 2019-12-04 ENCOUNTER — OFFICE VISIT (OUTPATIENT)
Dept: INTERNAL MEDICINE | Facility: CLINIC | Age: 64
End: 2019-12-04
Payer: MEDICARE

## 2019-12-04 ENCOUNTER — OFFICE VISIT (OUTPATIENT)
Dept: PAIN MEDICINE | Facility: CLINIC | Age: 64
End: 2019-12-04
Payer: MEDICARE

## 2019-12-04 VITALS
HEIGHT: 63 IN | TEMPERATURE: 96 F | BODY MASS INDEX: 42.23 KG/M2 | SYSTOLIC BLOOD PRESSURE: 130 MMHG | WEIGHT: 238.31 LBS | OXYGEN SATURATION: 96 % | DIASTOLIC BLOOD PRESSURE: 80 MMHG | HEART RATE: 69 BPM

## 2019-12-04 VITALS
RESPIRATION RATE: 18 BRPM | BODY MASS INDEX: 42.17 KG/M2 | HEART RATE: 73 BPM | WEIGHT: 238 LBS | HEIGHT: 63 IN | DIASTOLIC BLOOD PRESSURE: 80 MMHG | SYSTOLIC BLOOD PRESSURE: 155 MMHG

## 2019-12-04 DIAGNOSIS — I10 HYPERTENSION GOAL BP (BLOOD PRESSURE) < 130/80: Primary | ICD-10-CM

## 2019-12-04 DIAGNOSIS — E78.5 HYPERLIPIDEMIA LDL GOAL <70: ICD-10-CM

## 2019-12-04 DIAGNOSIS — E66.01 MORBID OBESITY WITH BMI OF 40.0-44.9, ADULT: ICD-10-CM

## 2019-12-04 DIAGNOSIS — G89.29 CHRONIC PAIN OF BOTH KNEES: Primary | ICD-10-CM

## 2019-12-04 DIAGNOSIS — E87.6 DIURETIC-INDUCED HYPOKALEMIA: ICD-10-CM

## 2019-12-04 DIAGNOSIS — B37.2 INTERTRIGINOUS CANDIDIASIS: ICD-10-CM

## 2019-12-04 DIAGNOSIS — M17.11 PRIMARY OSTEOARTHRITIS OF RIGHT KNEE: ICD-10-CM

## 2019-12-04 DIAGNOSIS — T50.2X5A DIURETIC-INDUCED HYPOKALEMIA: ICD-10-CM

## 2019-12-04 DIAGNOSIS — M81.0 AGE-RELATED OSTEOPOROSIS WITHOUT CURRENT PATHOLOGICAL FRACTURE: ICD-10-CM

## 2019-12-04 DIAGNOSIS — M25.561 CHRONIC PAIN OF BOTH KNEES: Primary | ICD-10-CM

## 2019-12-04 DIAGNOSIS — M25.562 CHRONIC PAIN OF BOTH KNEES: Primary | ICD-10-CM

## 2019-12-04 DIAGNOSIS — R80.9 CONTROLLED TYPE 2 DIABETES MELLITUS WITH MICROALBUMINURIA, WITHOUT LONG-TERM CURRENT USE OF INSULIN: ICD-10-CM

## 2019-12-04 DIAGNOSIS — E11.29 CONTROLLED TYPE 2 DIABETES MELLITUS WITH MICROALBUMINURIA, WITHOUT LONG-TERM CURRENT USE OF INSULIN: ICD-10-CM

## 2019-12-04 DIAGNOSIS — R05.9 COUGH: ICD-10-CM

## 2019-12-04 DIAGNOSIS — Z23 IMMUNIZATION DUE: ICD-10-CM

## 2019-12-04 DIAGNOSIS — M17.12 PRIMARY OSTEOARTHRITIS OF LEFT KNEE: ICD-10-CM

## 2019-12-04 PROCEDURE — 99999 PR PBB SHADOW E&M-EST. PATIENT-LVL III: ICD-10-PCS | Mod: PBBFAC,,, | Performed by: PHYSICAL MEDICINE & REHABILITATION

## 2019-12-04 PROCEDURE — 99214 OFFICE O/P EST MOD 30 MIN: CPT | Mod: S$PBB,,, | Performed by: INTERNAL MEDICINE

## 2019-12-04 PROCEDURE — 99214 OFFICE O/P EST MOD 30 MIN: CPT | Mod: PBBFAC,27,25 | Performed by: INTERNAL MEDICINE

## 2019-12-04 PROCEDURE — 99204 PR OFFICE/OUTPT VISIT, NEW, LEVL IV, 45-59 MIN: ICD-10-PCS | Mod: S$PBB,,, | Performed by: PHYSICAL MEDICINE & REHABILITATION

## 2019-12-04 PROCEDURE — 99213 OFFICE O/P EST LOW 20 MIN: CPT | Mod: PBBFAC,25 | Performed by: PHYSICAL MEDICINE & REHABILITATION

## 2019-12-04 PROCEDURE — 99999 PR PBB SHADOW E&M-EST. PATIENT-LVL III: CPT | Mod: PBBFAC,,, | Performed by: PHYSICAL MEDICINE & REHABILITATION

## 2019-12-04 PROCEDURE — 99204 OFFICE O/P NEW MOD 45 MIN: CPT | Mod: S$PBB,,, | Performed by: PHYSICAL MEDICINE & REHABILITATION

## 2019-12-04 PROCEDURE — 99999 PR PBB SHADOW E&M-EST. PATIENT-LVL IV: CPT | Mod: PBBFAC,,, | Performed by: INTERNAL MEDICINE

## 2019-12-04 PROCEDURE — 99999 PR PBB SHADOW E&M-EST. PATIENT-LVL IV: ICD-10-PCS | Mod: PBBFAC,,, | Performed by: INTERNAL MEDICINE

## 2019-12-04 PROCEDURE — 90662 IIV NO PRSV INCREASED AG IM: CPT | Mod: PBBFAC

## 2019-12-04 PROCEDURE — 99214 PR OFFICE/OUTPT VISIT, EST, LEVL IV, 30-39 MIN: ICD-10-PCS | Mod: S$PBB,,, | Performed by: INTERNAL MEDICINE

## 2019-12-04 RX ORDER — POTASSIUM CHLORIDE 750 MG/1
10 TABLET, EXTENDED RELEASE ORAL DAILY
Qty: 90 TABLET | Refills: 1 | Status: SHIPPED | OUTPATIENT
Start: 2019-12-04 | End: 2020-05-27 | Stop reason: SDUPTHER

## 2019-12-04 RX ORDER — METFORMIN HYDROCHLORIDE 1000 MG/1
TABLET ORAL
Qty: 180 TABLET | Refills: 1 | Status: SHIPPED | OUTPATIENT
Start: 2019-12-04 | End: 2020-07-13

## 2019-12-04 RX ORDER — METFORMIN HYDROCHLORIDE 1000 MG/1
TABLET ORAL
Qty: 180 TABLET | Refills: 1 | Status: SHIPPED | OUTPATIENT
Start: 2019-12-04 | End: 2019-12-04 | Stop reason: SDUPTHER

## 2019-12-04 RX ORDER — POTASSIUM CHLORIDE 750 MG/1
10 TABLET, EXTENDED RELEASE ORAL DAILY
Qty: 90 TABLET | Refills: 1 | Status: SHIPPED | OUTPATIENT
Start: 2019-12-04 | End: 2019-12-04 | Stop reason: SDUPTHER

## 2019-12-04 RX ORDER — VIT C/E/ZN/COPPR/LUTEIN/ZEAXAN 250MG-90MG
1000 CAPSULE ORAL DAILY
Qty: 30 CAPSULE | Refills: 11 | Status: SHIPPED | OUTPATIENT
Start: 2019-12-04

## 2019-12-04 RX ORDER — NYSTATIN 100000 [USP'U]/G
POWDER TOPICAL 4 TIMES DAILY
Qty: 60 G | Refills: 1 | Status: SHIPPED | OUTPATIENT
Start: 2019-12-04 | End: 2021-01-07

## 2019-12-04 RX ORDER — NYSTATIN 100000 [USP'U]/G
POWDER TOPICAL 4 TIMES DAILY
Qty: 60 G | Refills: 1 | Status: SHIPPED | OUTPATIENT
Start: 2019-12-04 | End: 2019-12-04 | Stop reason: SDUPTHER

## 2019-12-04 RX ORDER — PROMETHAZINE HYDROCHLORIDE AND DEXTROMETHORPHAN HYDROBROMIDE 6.25; 15 MG/5ML; MG/5ML
5 SYRUP ORAL EVERY 8 HOURS PRN
Qty: 118 ML | Refills: 0 | Status: SHIPPED | OUTPATIENT
Start: 2019-12-04 | End: 2019-12-14

## 2019-12-04 RX ORDER — AMLODIPINE BESYLATE 10 MG/1
10 TABLET ORAL DAILY
Qty: 90 TABLET | Refills: 1 | Status: SHIPPED | OUTPATIENT
Start: 2019-12-04 | End: 2020-08-27

## 2019-12-04 RX ORDER — ALENDRONATE SODIUM 70 MG/1
70 TABLET ORAL
Qty: 4 TABLET | Refills: 11 | Status: SHIPPED | OUTPATIENT
Start: 2019-12-04 | End: 2019-12-04 | Stop reason: SDUPTHER

## 2019-12-04 RX ORDER — ALENDRONATE SODIUM 70 MG/1
70 TABLET ORAL
Qty: 4 TABLET | Refills: 11 | Status: SHIPPED | OUTPATIENT
Start: 2019-12-04 | End: 2020-12-15 | Stop reason: SDUPTHER

## 2019-12-04 NOTE — LETTER
December 4, 2019      Radha Valencia PA-C  [address]  [place] [address]           [facility]  [address] [facility] [address]  Phone: [phone]  Fax: [phone]          Patient: Carisa Curry   MR Number: [id number]   YOB: 1955   Date of Visit: 12/4/2019       Dear Radha Valencia:    Thank you for referring Carisa Curry to me for evaluation. Attached you will find relevant portions of my assessment and plan of care.    If you have questions, please do not hesitate to call me. I look forward to following Carisa Curry along with you.    Sincerely,    Butch Chao MD    Enclosure  CC:  No Recipients    If you would like to receive this communication electronically, please contact [email or web address] or [phone] to request more information on [employer] access.    For providers and/or their staff who would like to refer a patient to [facility], please contact us through our one-stop-shop provider referral line, [facility], at [phone].    If you feel you have received this communication in error or would no longer like to receive these types of communications, please e-mail [email or web address]

## 2019-12-04 NOTE — PROGRESS NOTES
Subjective:       Patient ID: Carisa Curry is a 64 y.o. female.    Chief Complaint: Follow-up (3 month )    Carisa Curry  64 y.o. Black or  female    Patient presents with:  Follow-up: 3 month     HPI: Here today to follow up on chronic conditions. She is here with her care provider.   HTN--compliant with losartan, amlodipine, HCTZ and metoprolol. She monitors her b/p at home. Her readings are in the 130's/80's.   She takes potassium due to being on a diuretic. She needs refills.   Diabetes--stable on Trulicity and metformin.                     HGBA1C                   6.3 (H)             11/27/2019            Microalbuminuria--stable on losartan.   HLD--compliant with pravastatin.                      CHOL                     166                 11/27/2019                 HDL                      61                  11/27/2019                 LDLCALC                  91.2                11/27/2019                TRIG                     69                  11/27/2019            She has a rash under her right breast. It is itchy. She sweats a lot under her breast.   Recent DEXA showed osteoporosis. She is not taking calcium or vitamin D.   She has a mild cough. The cough is not productive. She denies any other symptoms.       Past Medical History:  Arthritis  CHF (congestive heart failure)  Diabetes mellitus, type 2  Diabetic retinopathy  Hyperlipidemia  Hypertension  Schizophrenia  Seizures      Comment:  reported per pt and sister, nothing since childhood    Current Outpatient Medications on File Prior to Visit:  ARIPiprazole (ABILIFY) 10 MG Tab, Take 1 tablet (10 mg total) by mouth once daily., Disp: 30 tablet, Rfl: 0  blood sugar diagnostic Strp, Twice daily glucose testing., Disp: 60 strip, Rfl: 6  blood-glucose meter kit, Use as instructed, Disp: 1 each, Rfl: 0  hydroCHLOROthiazide (HYDRODIURIL) 25 MG tablet, Take 1 tablet (25 mg total) by mouth once daily., Disp: 30 tablet,  "Rfl: 3  insulin needles, disposable, (PEN NEEDLE) 31 X 5/16 " Ndle, Once a day insulin injection., Disp: 30 each, Rfl: 6  lancets Misc, Twice daily glucose monitoring., Disp: 60 each, Rfl: 6  losartan (COZAAR) 100 MG tablet, Take 1 tablet (100 mg total) by mouth once daily., Disp: 30 tablet, Rfl: 3  meloxicam (MOBIC) 15 MG tablet, Take 1 tablet (15 mg total) by mouth once daily. Take with food.  Discontinue if you develop GI side effects., Disp: 30 tablet, Rfl: 1  metoprolol succinate (TOPROL-XL) 25 MG 24 hr tablet, Take 1 tablet (25 mg total) by mouth once daily., Disp: 90 tablet, Rfl: 1  pravastatin (PRAVACHOL) 40 MG tablet, Take 1 tablet (40 mg total) by mouth once daily., Disp: 90 tablet, Rfl: 1  amLODIPine (NORVASC) 10 MG tablet, Take 1 tablet (10 mg total) by mouth once daily., Disp: 90 tablet, Rfl: 1  dulaglutide (TRULICITY) 1.5 mg/0.5 mL PnIj, Inject 1.5 mg into the skin every 7 days., Disp: 12 mL, Rfl: 1  metFORMIN (GLUCOPHAGE) 1000 MG tablet, Take 1 tablet by mouth two times daily with meals., Disp: 180 tablet, Rfl: 0  potassium chloride (KLOR-CON) 10 MEQ TbSR, Take 1 tablet (10 mEq total) by mouth once daily., Disp: 90 tablet, Rfl: 0    Allergies:  Review of patient's allergies indicates:  No Known Allergies          Review of Systems   Constitutional: Negative for fever.   Respiratory: Positive for cough. Negative for shortness of breath.    Cardiovascular: Positive for leg swelling. Negative for chest pain.   Gastrointestinal: Negative for abdominal pain.   Genitourinary: Negative for difficulty urinating.   Musculoskeletal: Positive for arthralgias.   Neurological: Negative for dizziness, numbness and headaches.       Objective:      Physical Exam   Constitutional: She is oriented to person, place, and time. She appears well-developed and well-nourished. No distress.   Eyes: No scleral icterus.   Cardiovascular: Normal rate, regular rhythm and normal heart sounds.   Pulses:       Dorsalis pedis pulses " are 2+ on the right side, and 1+ on the left side.   Pulmonary/Chest: Effort normal and breath sounds normal. No respiratory distress.   Musculoskeletal: She exhibits edema.   Feet:   Right Foot:   Protective Sensation: 5 sites tested. 5 sites sensed.   Skin Integrity: Positive for callus. Negative for ulcer.   Left Foot:   Protective Sensation: 5 sites tested. 5 sites sensed.   Skin Integrity: Positive for callus. Negative for ulcer.   Neurological: She is alert and oriented to person, place, and time.   Skin: Skin is warm and dry.   Psychiatric: She has a normal mood and affect.   Vitals reviewed.      Assessment:       1. Hypertension goal BP (blood pressure) < 130/80    2. Diuretic-induced hypokalemia    3. Controlled type 2 diabetes mellitus with microalbuminuria, without long-term current use of insulin    4. Hyperlipidemia LDL goal <70    5. Intertriginous candidiasis    6. Age-related osteoporosis without current pathological fracture    7. Cough    8. Morbid obesity with BMI of 40.0-44.9, adult    9. Immunization due        Plan:       Carisa was seen today for follow-up.    Diagnoses and all orders for this visit:    Hypertension goal BP (blood pressure) < 130/80  -     amLODIPine (NORVASC) 10 MG tablet; Take 1 tablet (10 mg total) by mouth once daily.  -     Continue metoprolol, losartan and HCTZ     Diuretic-induced hypokalemia  -     potassium chloride (KLOR-CON) 10 MEQ TbSR; Take 1 tablet (10 mEq total) by mouth once daily.    Controlled type 2 diabetes mellitus with microalbuminuria, without long-term current use of insulin  -     dulaglutide (TRULICITY) 1.5 mg/0.5 mL PnIj; Inject 1.5 mg into the skin every 7 days.  -     metFORMIN (GLUCOPHAGE) 1000 MG tablet; Take 1 tablet by mouth two times daily with meals.  -     Continue losartan     Hyperlipidemia LDL goal <70  -     Continue pravastatin    Intertriginous candidiasis  -     nystatin (MYCOSTATIN) powder; Apply topically 4 (four) times  daily.    Age-related osteoporosis without current pathological fracture  -     cholecalciferol, vitamin D3, (VITAMIN D3) 25 mcg (1,000 unit) capsule; Take 1 capsule (1,000 Units total) by mouth once daily.  -     Vitamin D; Future  -     alendronate (FOSAMAX) 70 MG tablet; Take 1 tablet (70 mg total) by mouth every 7 days.    Cough  -     promethazine-dextromethorphan (PROMETHAZINE-DM) 6.25-15 mg/5 mL Syrp; Take 5 mLs by mouth every 8 (eight) hours as needed.    Morbid obesity with BMI of 40.0-44.9, adult  -     Lifestyle modifications discussed    Immunization due  -     Influenza - High Dose (65+) (PF) (IM)    Labs and f/u in 6 months.

## 2019-12-04 NOTE — H&P (VIEW-ONLY)
New Patient Chronic Pain Note (Initial Visit)    Referring Physician: Radha Valencia,*    PCP: Guadalupe Guzman DO    Chief Complaint:   Chief Complaint   Patient presents with    Knee Pain     bilateral        SUBJECTIVE:     Carisa Curry is a 64 y.o. female who presents to the clinic for the evaluation of bilateral knee pain. The pain started several years ago without any specific injury or trauma and symptoms have been worsening.The pain is located in the bilateral knees.  The pain is described as aching and is rated as 10/10. The pain is rated with a score of  0/10 on the BEST day and a score of 10/10 on the WORST day.  Her pain level is mainly related to her weight-bearing status.  Symptoms interfere with daily activity and sleep. The pain is exacerbated by prolonged weight-bearing, sit to stand, full range of motion, and nighttime.  The pain is mitigated by rest in previous injections.  However, the injections have not provided long-lasting relief.  The patient reports spending 2-4 hours per day reclining. The patient reports 6-8 hours of uninterrupted sleep per night.  She uses a quad cane for ambulation assistance.    Patient denies night fever/night sweats, urinary incontinence, bowel incontinence, significant weight loss, significant motor weakness and loss of sensations.  She does report that the knees occasionally have given out on her and has caused falls in the past.  No recent falls and she reports that her last fall was likely 5/6 months ago.    Of note, patient is accompanied to today's clinic visit with her daughter who provides the majority of the history.    Pain Disability Index Review:     Last 3 PDI Scores 12/4/2019   Pain Disability Index (PDI) 37       Non-Pharmacologic Treatments:  Physical Therapy/Home Exercise: yes  Ice/Heat:yes  TENS: no  Acupuncture: no  Massage: no  Chiropractic: no    Other: yes, assistive ambulation devices      Pain Medications:  - Opioids: Ultram  (Tramadol HCL)  - Adjuvant Medications: Lyrica ( Pregabalin) and Mobic (Meloxicam)  - Anti-Coagulants: None     report:  Not applicable    Pain Procedures:   -corticosteroid injections to both knees  -Euflexxa injection series to both knees (given in 07/2019)      Imaging:   X-ray bilateral knee 05/27/2019:  Right knee: There is no radiographic evidence of acute osseous, articular, or soft tissue abnormality. There is severe tricompartmental osteoarthritis.  Joint space loss appears most severe in the medial compartment.    Left knee:  There is no radiographic evidence of acute osseous, articular, or soft tissue abnormality. There is severe tricompartmental osteoarthritis.  Joint space loss appears most severe in the medial compartment.    Past Medical History:   Diagnosis Date    Arthritis     CHF (congestive heart failure)     Diabetes mellitus, type 2     Diabetic retinopathy     Hyperlipidemia     Hypertension     Schizophrenia     Seizures     reported per pt and sister, nothing since childhood     Past Surgical History:   Procedure Laterality Date    EYE SURGERY      SPINE SURGERY       Social History     Socioeconomic History    Marital status: Single     Spouse name: Not on file    Number of children: Not on file    Years of education: Not on file    Highest education level: Not on file   Occupational History    Not on file   Social Needs    Financial resource strain: Not on file    Food insecurity:     Worry: Not on file     Inability: Not on file    Transportation needs:     Medical: Not on file     Non-medical: Not on file   Tobacco Use    Smoking status: Never Smoker    Smokeless tobacco: Never Used   Substance and Sexual Activity    Alcohol use: No     Alcohol/week: 0.0 standard drinks    Drug use: No    Sexual activity: Never   Lifestyle    Physical activity:     Days per week: Not on file     Minutes per session: Not on file    Stress: Not on file   Relationships    Social  "connections:     Talks on phone: Not on file     Gets together: Not on file     Attends L[employer] service: Not on file     Active member of club or organization: Not on file     Attends meetings of clubs or organizations: Not on file     Relationship status: Not on file   Other Topics Concern    Not on file   Social History Narrative    Not on file     Family History   Problem Relation Age of Onset    Kidney disease Mother     Heart failure Mother     Cataracts Mother     Hypertension Mother     Heart disease Mother     Stroke Mother     Cancer Father         brain    Diabetes Father     Diabetes Maternal Aunt     Hypertension Maternal Aunt     Diabetes Paternal Aunt     Hypertension Paternal Aunt     Heart disease Paternal Aunt     Diabetes Paternal Uncle     Heart disease Paternal Uncle        Review of patient's allergies indicates:  No Known Allergies    Current Outpatient Medications   Medication Sig    amLODIPine (NORVASC) 10 MG tablet Take 1 tablet (10 mg total) by mouth once daily.    ARIPiprazole (ABILIFY) 10 MG Tab Take 1 tablet (10 mg total) by mouth once daily.    blood sugar diagnostic Strp Twice daily glucose testing.    dulaglutide (TRULICITY) 1.5 mg/0.5 mL PnIj Inject 1.5 mg into the skin every 7 days.    hydroCHLOROthiazide (HYDRODIURIL) 25 MG tablet Take 1 tablet (25 mg total) by mouth once daily.    insulin needles, disposable, (PEN NEEDLE) 31 X 5/16 " Ndle Once a day insulin injection.    lancets Misc Twice daily glucose monitoring.    losartan (COZAAR) 100 MG tablet Take 1 tablet (100 mg total) by mouth once daily.    meloxicam (MOBIC) 15 MG tablet Take 1 tablet (15 mg total) by mouth once daily. Take with food.  Discontinue if you develop GI side effects.    metFORMIN (GLUCOPHAGE) 1000 MG tablet Take 1 tablet by mouth two times daily with meals.    metoprolol succinate (TOPROL-XL) 25 MG 24 hr tablet Take 1 tablet (25 mg total) by mouth once daily.    potassium " "chloride (KLOR-CON) 10 MEQ TbSR Take 1 tablet (10 mEq total) by mouth once daily.    pravastatin (PRAVACHOL) 40 MG tablet Take 1 tablet (40 mg total) by mouth once daily.    blood-glucose meter kit Use as instructed     No current facility-administered medications for this visit.        Review of Systems     GENERAL:  No weight loss, malaise or fevers.  HEENT:   No recent changes in vision or hearing  NECK:  Negative for lumps, no difficulty with swallowing.  RESPIRATORY:  Negative for cough, wheezing or shortness of breath, patient denies any recent URI.  CARDIOVASCULAR:  Negative for chest pain, leg swelling or palpitations.  GI:  Negative for abdominal discomfort, blood in stools or black stools or change in bowel habits.  MUSCULOSKELETAL:  See HPI.  SKIN:  Negative for lesions, rash, and itching.  PSYCH:  No mood disorder or recent psychosocial stressors.  Patients sleep is not disturbed secondary to pain.  HEMATOLOGY/LYMPHOLOGY:  Negative for prolonged bleeding, bruising easily or swollen nodes.  Patient is not currently taking any anti-coagulants  NEURO:   No history of headaches, syncope, paralysis, seizures or tremors.  ENDO: +DM  All other reviewed and negative other than HPI.    OBJECTIVE:    BP (!) 155/80 (BP Location: Left arm, Patient Position: Sitting, BP Method: Medium (Automatic))   Pulse 73   Resp 18   Ht 5' 3" (1.6 m)   Wt 108 kg (238 lb)   BMI 42.16 kg/m²         Physical Exam    GENERAL: Well appearing, in no acute distress, alert and oriented x3.  PSYCH:  Mood and affect appropriate.  SKIN: Skin color, texture, turgor normal, no rashes or lesions.  HEAD/FACE:  Normocephalic, atraumatic. Cranial nerves grossly intact.  NECK: No pain to palpation over the cervical paraspinous muscles. Spurling Negative. No pain with neck flexion, extension, or lateral flexion.   CV: RRR with palpation of the radial artery.  PULM: No evidence of respiratory difficulty, symmetric chest rise.  GI:  Soft and " non-tender.  BACK: Straight leg raising in the sitting and supine positions is negative to radicular pain. No pain to palpation over the facet joints of the lumbar spine or spinous processes. Normal range of motion without pain reproduction.  EXTREMITIES: Peripheral joint ROM is full and pain free without obvious instability or laxity in all four extremities with the exception of limited flexion to both knees secondary to pain and physical block, can get each knee to 90° flexion.. No deformities, edema, or skin discoloration. Good capillary refill.  No erythema, warmth, or swelling of either knee.  MUSCULOSKELETAL: Shoulder and hip provocative maneuvers are negative.  Tenderness to palpation over the medial and lateral joint lines of both knees.  No laxity noted in either knee.  There is no pain with palpation over the sacroiliac joints bilaterally.  FABERs test is negative.  FADIRs test is negative.   Bilateral upper and lower extremity strength is normal and symmetric.  No atrophy or tone abnormalities are noted.  NEURO: Bilateral upper and lower extremity coordination and muscle stretch reflexes are physiologic and symmetric.  Plantar response are downgoing. No clonus.  No loss of sensation is noted.  GAIT:  Antalgic, walks with quad cane for assistance.      LABS:  Lab Results   Component Value Date    WBC 7.06 07/01/2015    HGB 13.4 07/01/2015    HCT 40.3 07/01/2015    MCV 83 07/01/2015     07/01/2015       CMP  Sodium   Date Value Ref Range Status   11/27/2019 140 136 - 145 mmol/L Final     Potassium   Date Value Ref Range Status   11/27/2019 3.6 3.5 - 5.1 mmol/L Final     Chloride   Date Value Ref Range Status   11/27/2019 102 95 - 110 mmol/L Final     CO2   Date Value Ref Range Status   11/27/2019 26 23 - 29 mmol/L Final     Glucose   Date Value Ref Range Status   11/27/2019 124 (H) 70 - 110 mg/dL Final     BUN, Bld   Date Value Ref Range Status   11/27/2019 18 8 - 23 mg/dL Final     Creatinine   Date  Value Ref Range Status   11/27/2019 1.0 0.5 - 1.4 mg/dL Final     Calcium   Date Value Ref Range Status   11/27/2019 9.5 8.7 - 10.5 mg/dL Final     Total Protein   Date Value Ref Range Status   11/27/2019 8.2 6.0 - 8.4 g/dL Final     Albumin   Date Value Ref Range Status   11/27/2019 3.9 3.5 - 5.2 g/dL Final     Total Bilirubin   Date Value Ref Range Status   11/27/2019 0.5 0.1 - 1.0 mg/dL Final     Comment:     For infants and newborns, interpretation of results should be based  on gestational age, weight and in agreement with clinical  observations.  Premature Infant recommended reference ranges:  Up to 24 hours.............<8.0 mg/dL  Up to 48 hours............<12.0 mg/dL  3-5 days..................<15.0 mg/dL  6-29 days.................<15.0 mg/dL       Alkaline Phosphatase   Date Value Ref Range Status   11/27/2019 97 55 - 135 U/L Final     AST   Date Value Ref Range Status   11/27/2019 12 10 - 40 U/L Final     ALT   Date Value Ref Range Status   11/27/2019 8 (L) 10 - 44 U/L Final     Anion Gap   Date Value Ref Range Status   11/27/2019 12 8 - 16 mmol/L Final     eGFR if    Date Value Ref Range Status   11/27/2019 >60.0 >60 mL/min/1.73 m^2 Final     eGFR if non    Date Value Ref Range Status   11/27/2019 59.3 (A) >60 mL/min/1.73 m^2 Final     Comment:     Calculation used to obtain the estimated glomerular filtration  rate (eGFR) is the CKD-EPI equation.          Lab Results   Component Value Date    HGBA1C 6.3 (H) 11/27/2019             ASSESSMENT: 64 y.o. year old female with bilateral knee pain, consistent with     1. Chronic pain of both knees  IR Peripheral Nerve Injection    Case Request-RAD/Other Procedure Area: Bilateral Genicular nerve block (DIAGNOSTIC, NO STEROID) with RN IV sedation    IR Peripheral Nerve Injection   2. Primary osteoarthritis of left knee  IR Peripheral Nerve Injection    Case Request-RAD/Other Procedure Area: Bilateral Genicular nerve block  (DIAGNOSTIC, NO STEROID) with RN IV sedation    IR Peripheral Nerve Injection   3. Primary osteoarthritis of right knee  IR Peripheral Nerve Injection    Case Request-RAD/Other Procedure Area: Bilateral Genicular nerve block (DIAGNOSTIC, NO STEROID) with RN IV sedation    IR Peripheral Nerve Injection         PLAN:   - Interventions: Will schedule for bilateral genicular nerve blocks for diagnostic purposes (no steroid).. Explained the risks and benefits of the procedure in detail with the patient today in clinic along with alternative treatment options, and the patient elected to pursue the intervention at this time.      - Anticoagulation use: no     - If significant benefit with above procedure, consider Cooled radiofrequency ablation of the bilateral knees, 2 weeks apart..     - Medications: I have stressed the importance of physical activity and a home exercise plan to help with pain and improve health., Patient can continue with medications for now since they are providing benefits, using them appropriately, and without side effects. and I do not feel this patient is a candidate for long term opioids for this non-cancer pain at this time     - Therapy:  Advised patient to continue with home exercises as previously instructed from previous physical therapy sessions.    - Labs:  Reviewed    - Imaging: Reviewed available imaging with patient and answered any questions they had regarding study.    - Consults/Referrals:  None at this time    - Records:  Reviewed/Obtain old records from outside physicians and imaging    - Follow up visit:  Will call following the diagnostic blocks to determine future plan of care    - Counseled patient regarding the importance of activity modification and physical therapy    - This condition does not require this patient to take time off of work, and the primary goal of our Pain Management services is to improve the patient's functional capacity.    - Patient Questions: Answered all  of the patient's questions regarding diagnosis, therapy, and treatment        The above plan and management options were discussed at length with patient. Patient is in agreement with the above and verbalized understanding.    I discussed the goals of interventional chronic pain management with the patient on today's visit.  I explained the utility of injections for diagnostic and therapeutic purposes.  We discussed a multimodal approach to pain including treating the patient's given worst pain at any given time.  We will use a systematic approach to addressing pain.  We will also adopt a multimodal approach that includes injections, adjuvant medications, physical therapy, at times psychiatry.  There may be a limited role for opioid use intermittently in the treatment of pain, more particularly for acute pain although no one approach can be used as a sole treatment modality.    I emphasized the importance of regular exercise, core strengthening and stretching, diet and weight loss as a cornerstone of long-term pain management.      Butch Chao MD  Interventional Pain Management  [facility]

## 2019-12-04 NOTE — PROGRESS NOTES
New Patient Chronic Pain Note (Initial Visit)    Referring Physician: Radha Valencia,*    PCP: Guadalupe Guzman DO    Chief Complaint:   Chief Complaint   Patient presents with    Knee Pain     bilateral        SUBJECTIVE:     Carisa Curry is a 64 y.o. female who presents to the clinic for the evaluation of bilateral knee pain. The pain started several years ago without any specific injury or trauma and symptoms have been worsening.The pain is located in the bilateral knees.  The pain is described as aching and is rated as 10/10. The pain is rated with a score of  0/10 on the BEST day and a score of 10/10 on the WORST day.  Her pain level is mainly related to her weight-bearing status.  Symptoms interfere with daily activity and sleep. The pain is exacerbated by prolonged weight-bearing, sit to stand, full range of motion, and nighttime.  The pain is mitigated by rest in previous injections.  However, the injections have not provided long-lasting relief.  The patient reports spending 2-4 hours per day reclining. The patient reports 6-8 hours of uninterrupted sleep per night.  She uses a quad cane for ambulation assistance.    Patient denies night fever/night sweats, urinary incontinence, bowel incontinence, significant weight loss, significant motor weakness and loss of sensations.  She does report that the knees occasionally have given out on her and has caused falls in the past.  No recent falls and she reports that her last fall was likely 5/6 months ago.    Of note, patient is accompanied to today's clinic visit with her daughter who provides the majority of the history.    Pain Disability Index Review:     Last 3 PDI Scores 12/4/2019   Pain Disability Index (PDI) 37       Non-Pharmacologic Treatments:  Physical Therapy/Home Exercise: yes  Ice/Heat:yes  TENS: no  Acupuncture: no  Massage: no  Chiropractic: no    Other: yes, assistive ambulation devices      Pain Medications:  - Opioids: Ultram  (Tramadol HCL)  - Adjuvant Medications: Lyrica ( Pregabalin) and Mobic (Meloxicam)  - Anti-Coagulants: None     report:  Not applicable    Pain Procedures:   -corticosteroid injections to both knees  -Euflexxa injection series to both knees (given in 07/2019)      Imaging:   X-ray bilateral knee 05/27/2019:  Right knee: There is no radiographic evidence of acute osseous, articular, or soft tissue abnormality. There is severe tricompartmental osteoarthritis.  Joint space loss appears most severe in the medial compartment.    Left knee:  There is no radiographic evidence of acute osseous, articular, or soft tissue abnormality. There is severe tricompartmental osteoarthritis.  Joint space loss appears most severe in the medial compartment.    Past Medical History:   Diagnosis Date    Arthritis     CHF (congestive heart failure)     Diabetes mellitus, type 2     Diabetic retinopathy     Hyperlipidemia     Hypertension     Schizophrenia     Seizures     reported per pt and sister, nothing since childhood     Past Surgical History:   Procedure Laterality Date    EYE SURGERY      SPINE SURGERY       Social History     Socioeconomic History    Marital status: Single     Spouse name: Not on file    Number of children: Not on file    Years of education: Not on file    Highest education level: Not on file   Occupational History    Not on file   Social Needs    Financial resource strain: Not on file    Food insecurity:     Worry: Not on file     Inability: Not on file    Transportation needs:     Medical: Not on file     Non-medical: Not on file   Tobacco Use    Smoking status: Never Smoker    Smokeless tobacco: Never Used   Substance and Sexual Activity    Alcohol use: No     Alcohol/week: 0.0 standard drinks    Drug use: No    Sexual activity: Never   Lifestyle    Physical activity:     Days per week: Not on file     Minutes per session: Not on file    Stress: Not on file   Relationships    Social  "connections:     Talks on phone: Not on file     Gets together: Not on file     Attends Advent service: Not on file     Active member of club or organization: Not on file     Attends meetings of clubs or organizations: Not on file     Relationship status: Not on file   Other Topics Concern    Not on file   Social History Narrative    Not on file     Family History   Problem Relation Age of Onset    Kidney disease Mother     Heart failure Mother     Cataracts Mother     Hypertension Mother     Heart disease Mother     Stroke Mother     Cancer Father         brain    Diabetes Father     Diabetes Maternal Aunt     Hypertension Maternal Aunt     Diabetes Paternal Aunt     Hypertension Paternal Aunt     Heart disease Paternal Aunt     Diabetes Paternal Uncle     Heart disease Paternal Uncle        Review of patient's allergies indicates:  No Known Allergies    Current Outpatient Medications   Medication Sig    amLODIPine (NORVASC) 10 MG tablet Take 1 tablet (10 mg total) by mouth once daily.    ARIPiprazole (ABILIFY) 10 MG Tab Take 1 tablet (10 mg total) by mouth once daily.    blood sugar diagnostic Strp Twice daily glucose testing.    dulaglutide (TRULICITY) 1.5 mg/0.5 mL PnIj Inject 1.5 mg into the skin every 7 days.    hydroCHLOROthiazide (HYDRODIURIL) 25 MG tablet Take 1 tablet (25 mg total) by mouth once daily.    insulin needles, disposable, (PEN NEEDLE) 31 X 5/16 " Ndle Once a day insulin injection.    lancets Misc Twice daily glucose monitoring.    losartan (COZAAR) 100 MG tablet Take 1 tablet (100 mg total) by mouth once daily.    meloxicam (MOBIC) 15 MG tablet Take 1 tablet (15 mg total) by mouth once daily. Take with food.  Discontinue if you develop GI side effects.    metFORMIN (GLUCOPHAGE) 1000 MG tablet Take 1 tablet by mouth two times daily with meals.    metoprolol succinate (TOPROL-XL) 25 MG 24 hr tablet Take 1 tablet (25 mg total) by mouth once daily.    potassium " "chloride (KLOR-CON) 10 MEQ TbSR Take 1 tablet (10 mEq total) by mouth once daily.    pravastatin (PRAVACHOL) 40 MG tablet Take 1 tablet (40 mg total) by mouth once daily.    blood-glucose meter kit Use as instructed     No current facility-administered medications for this visit.        Review of Systems     GENERAL:  No weight loss, malaise or fevers.  HEENT:   No recent changes in vision or hearing  NECK:  Negative for lumps, no difficulty with swallowing.  RESPIRATORY:  Negative for cough, wheezing or shortness of breath, patient denies any recent URI.  CARDIOVASCULAR:  Negative for chest pain, leg swelling or palpitations.  GI:  Negative for abdominal discomfort, blood in stools or black stools or change in bowel habits.  MUSCULOSKELETAL:  See HPI.  SKIN:  Negative for lesions, rash, and itching.  PSYCH:  No mood disorder or recent psychosocial stressors.  Patients sleep is not disturbed secondary to pain.  HEMATOLOGY/LYMPHOLOGY:  Negative for prolonged bleeding, bruising easily or swollen nodes.  Patient is not currently taking any anti-coagulants  NEURO:   No history of headaches, syncope, paralysis, seizures or tremors.  ENDO: +DM  All other reviewed and negative other than HPI.    OBJECTIVE:    BP (!) 155/80 (BP Location: Left arm, Patient Position: Sitting, BP Method: Medium (Automatic))   Pulse 73   Resp 18   Ht 5' 3" (1.6 m)   Wt 108 kg (238 lb)   BMI 42.16 kg/m²         Physical Exam    GENERAL: Well appearing, in no acute distress, alert and oriented x3.  PSYCH:  Mood and affect appropriate.  SKIN: Skin color, texture, turgor normal, no rashes or lesions.  HEAD/FACE:  Normocephalic, atraumatic. Cranial nerves grossly intact.  NECK: No pain to palpation over the cervical paraspinous muscles. Spurling Negative. No pain with neck flexion, extension, or lateral flexion.   CV: RRR with palpation of the radial artery.  PULM: No evidence of respiratory difficulty, symmetric chest rise.  GI:  Soft and " non-tender.  BACK: Straight leg raising in the sitting and supine positions is negative to radicular pain. No pain to palpation over the facet joints of the lumbar spine or spinous processes. Normal range of motion without pain reproduction.  EXTREMITIES: Peripheral joint ROM is full and pain free without obvious instability or laxity in all four extremities with the exception of limited flexion to both knees secondary to pain and physical block, can get each knee to 90° flexion.. No deformities, edema, or skin discoloration. Good capillary refill.  No erythema, warmth, or swelling of either knee.  MUSCULOSKELETAL: Shoulder and hip provocative maneuvers are negative.  Tenderness to palpation over the medial and lateral joint lines of both knees.  No laxity noted in either knee.  There is no pain with palpation over the sacroiliac joints bilaterally.  FABERs test is negative.  FADIRs test is negative.   Bilateral upper and lower extremity strength is normal and symmetric.  No atrophy or tone abnormalities are noted.  NEURO: Bilateral upper and lower extremity coordination and muscle stretch reflexes are physiologic and symmetric.  Plantar response are downgoing. No clonus.  No loss of sensation is noted.  GAIT:  Antalgic, walks with quad cane for assistance.      LABS:  Lab Results   Component Value Date    WBC 7.06 07/01/2015    HGB 13.4 07/01/2015    HCT 40.3 07/01/2015    MCV 83 07/01/2015     07/01/2015       CMP  Sodium   Date Value Ref Range Status   11/27/2019 140 136 - 145 mmol/L Final     Potassium   Date Value Ref Range Status   11/27/2019 3.6 3.5 - 5.1 mmol/L Final     Chloride   Date Value Ref Range Status   11/27/2019 102 95 - 110 mmol/L Final     CO2   Date Value Ref Range Status   11/27/2019 26 23 - 29 mmol/L Final     Glucose   Date Value Ref Range Status   11/27/2019 124 (H) 70 - 110 mg/dL Final     BUN, Bld   Date Value Ref Range Status   11/27/2019 18 8 - 23 mg/dL Final     Creatinine   Date  Value Ref Range Status   11/27/2019 1.0 0.5 - 1.4 mg/dL Final     Calcium   Date Value Ref Range Status   11/27/2019 9.5 8.7 - 10.5 mg/dL Final     Total Protein   Date Value Ref Range Status   11/27/2019 8.2 6.0 - 8.4 g/dL Final     Albumin   Date Value Ref Range Status   11/27/2019 3.9 3.5 - 5.2 g/dL Final     Total Bilirubin   Date Value Ref Range Status   11/27/2019 0.5 0.1 - 1.0 mg/dL Final     Comment:     For infants and newborns, interpretation of results should be based  on gestational age, weight and in agreement with clinical  observations.  Premature Infant recommended reference ranges:  Up to 24 hours.............<8.0 mg/dL  Up to 48 hours............<12.0 mg/dL  3-5 days..................<15.0 mg/dL  6-29 days.................<15.0 mg/dL       Alkaline Phosphatase   Date Value Ref Range Status   11/27/2019 97 55 - 135 U/L Final     AST   Date Value Ref Range Status   11/27/2019 12 10 - 40 U/L Final     ALT   Date Value Ref Range Status   11/27/2019 8 (L) 10 - 44 U/L Final     Anion Gap   Date Value Ref Range Status   11/27/2019 12 8 - 16 mmol/L Final     eGFR if    Date Value Ref Range Status   11/27/2019 >60.0 >60 mL/min/1.73 m^2 Final     eGFR if non    Date Value Ref Range Status   11/27/2019 59.3 (A) >60 mL/min/1.73 m^2 Final     Comment:     Calculation used to obtain the estimated glomerular filtration  rate (eGFR) is the CKD-EPI equation.          Lab Results   Component Value Date    HGBA1C 6.3 (H) 11/27/2019             ASSESSMENT: 64 y.o. year old female with bilateral knee pain, consistent with     1. Chronic pain of both knees  IR Peripheral Nerve Injection    Case Request-RAD/Other Procedure Area: Bilateral Genicular nerve block (DIAGNOSTIC, NO STEROID) with RN IV sedation    IR Peripheral Nerve Injection   2. Primary osteoarthritis of left knee  IR Peripheral Nerve Injection    Case Request-RAD/Other Procedure Area: Bilateral Genicular nerve block  (DIAGNOSTIC, NO STEROID) with RN IV sedation    IR Peripheral Nerve Injection   3. Primary osteoarthritis of right knee  IR Peripheral Nerve Injection    Case Request-RAD/Other Procedure Area: Bilateral Genicular nerve block (DIAGNOSTIC, NO STEROID) with RN IV sedation    IR Peripheral Nerve Injection         PLAN:   - Interventions: Will schedule for bilateral genicular nerve blocks for diagnostic purposes (no steroid).. Explained the risks and benefits of the procedure in detail with the patient today in clinic along with alternative treatment options, and the patient elected to pursue the intervention at this time.      - Anticoagulation use: no     - If significant benefit with above procedure, consider Cooled radiofrequency ablation of the bilateral knees, 2 weeks apart..     - Medications: I have stressed the importance of physical activity and a home exercise plan to help with pain and improve health., Patient can continue with medications for now since they are providing benefits, using them appropriately, and without side effects. and I do not feel this patient is a candidate for long term opioids for this non-cancer pain at this time     - Therapy:  Advised patient to continue with home exercises as previously instructed from previous physical therapy sessions.    - Labs:  Reviewed    - Imaging: Reviewed available imaging with patient and answered any questions they had regarding study.    - Consults/Referrals:  None at this time    - Records:  Reviewed/Obtain old records from outside physicians and imaging    - Follow up visit:  Will call following the diagnostic blocks to determine future plan of care    - Counseled patient regarding the importance of activity modification and physical therapy    - This condition does not require this patient to take time off of work, and the primary goal of our Pain Management services is to improve the patient's functional capacity.    - Patient Questions: Answered all  of the patient's questions regarding diagnosis, therapy, and treatment        The above plan and management options were discussed at length with patient. Patient is in agreement with the above and verbalized understanding.    I discussed the goals of interventional chronic pain management with the patient on today's visit.  I explained the utility of injections for diagnostic and therapeutic purposes.  We discussed a multimodal approach to pain including treating the patient's given worst pain at any given time.  We will use a systematic approach to addressing pain.  We will also adopt a multimodal approach that includes injections, adjuvant medications, physical therapy, at times psychiatry.  There may be a limited role for opioid use intermittently in the treatment of pain, more particularly for acute pain although no one approach can be used as a sole treatment modality.    I emphasized the importance of regular exercise, core strengthening and stretching, diet and weight loss as a cornerstone of long-term pain management.      Butch Chao MD  Interventional Pain Management  [facility]

## 2019-12-04 NOTE — PATIENT INSTRUCTIONS
Alendronate tablets  What is this medicine?  ALENDRONATE (a RYLAN droe john) slows calcium loss from bones. It helps to make normal healthy bone and to slow bone loss in people with Paget's disease and osteoporosis. It may be used in others at risk for bone loss.  How should I use this medicine?  You must take this medicine exactly as directed or you will lower the amount of the medicine you absorb into your body or you may cause yourself harm. Take this medicine by mouth first thing in the morning, after you are up for the day. Do not eat or drink anything before you take your medicine. Swallow the tablet with a full glass (6 to 8 fluid ounces) of plain water. Do not take this medicine with any other drink. Do not chew or crush the tablet. After taking this medicine, do not eat breakfast, drink, or take any medicines or vitamins for at least 30 minutes. Sit or stand up for at least 30 minutes after you take this medicine; do not lie down. Do not take your medicine more often than directed.  Talk to your pediatrician regarding the use of this medicine in children. Special care may be needed.  What side effects may I notice from receiving this medicine?  Side effects that you should report to your doctor or health care professional as soon as possible:  · allergic reactions like skin rash, itching or hives, swelling of the face, lips, or tongue  · black or tarry stools  · bone, muscle or joint pain  · changes in vision  · chest pain  · heartburn or stomach pain  · jaw pain, especially after dental work  · pain or trouble when swallowing  · redness, blistering, peeling or loosening of the skin, including inside the mouth  Side effects that usually do not require medical attention (report to your doctor or health care professional if they continue or are bothersome):  · changes in taste  · diarrhea or constipation  · eye pain or itching  · headache  · nausea or vomiting  · stomach gas or fullness  What may interact  with this medicine?  · aluminum hydroxide  · antacids  · aspirin  · calcium supplements  · drugs for inflammation like ibuprofen, naproxen, and others  · iron supplements  · magnesium supplements  · vitamins with minerals  What if I miss a dose?  If you miss a dose, do not take it later in the day. Continue your normal schedule starting the next morning. Do not take double or extra doses.  Where should I keep my medicine?  Keep out of the reach of children.  Store at room temperature of 15 and 30 degrees C (59 and 86 degrees F). Throw away any unused medicine after the expiration date.  What should I tell my health care provider before I take this medicine?  They need to know if you have any of these conditions:  · dental disease  · esophagus, stomach, or intestine problems, like acid reflux or GERD  · kidney disease  · low blood calcium  · low vitamin D  · problems sitting or standing 30 minutes  · trouble swallowing  · an unusual or allergic reaction to alendronate, other medicines, foods, dyes, or preservatives  · pregnant or trying to get pregnant  · breast-feeding  What should I watch for while using this medicine?  Visit your doctor or health care professional for regular checks ups. It may be some time before you see benefit from this medicine. Do not stop taking your medicine except on your doctor's advice. Your doctor or health care professional may order blood tests and other tests to see how you are doing.  You should make sure you get enough calcium and vitamin D while you are taking this medicine, unless your doctor tells you not to. Discuss the foods you eat and the vitamins you take with your health care professional.  Some people who take this medicine have severe bone, joint, and/or muscle pain. This medicine may also increase your risk for a broken thigh bone. Tell your doctor right away if you have pain in your upper leg or groin. Tell your doctor if you have any pain that does not go away or that  gets worse.  This medicine can make you more sensitive to the sun. If you get a rash while taking this medicine, sunlight may cause the rash to get worse. Keep out of the sun. If you cannot avoid being in the sun, wear protective clothing and use sunscreen. Do not use sun lamps or tanning beds/booths.  NOTE:This sheet is a summary. It may not cover all possible information. If you have questions about this medicine, talk to your doctor, pharmacist, or health care provider. Copyright© 2017 [employer]        Living with Osteoporosis: Preventing Fractures  If you have osteoporosis, you can do a lot to reduce its effect on your life. Knowing how to prevent fractures and spinal curvature can help you live more comfortably and safely with this disease.    Reducing your risk for fractures  The most common fracture sites in people with osteoporosis are the wrist, spine, and hip. These fractures are often caused by accidents and falls. All fractures are painful and may limit what you can do. But hip fractures are very serious. They need surgery, and it can take months to recover. To reduce your risk for fractures:  · Get regular exercise. Try walking, swimming, or weight training.  · Eat foods that are rich in calcium, or take calcium supplements.  · Make your home safe to help avoid accidents.  · Take extra precautions not to fall in risky areas, such as icy sidewalks.  Understanding spinal fractures  Your spine is made up of many bones called vertebrae. Osteoporosis can cause the vertebrae in your spine to collapse. As a result, your upper back may arch forward, creating a curvature. Spine fractures may also result from back strain and bad posture. You will also lose height. Your lower spine must then adjust to keep your body balanced. This can cause back pain. To prevent or lessen these spinal changes:  · Practice good posture.  · Use proper techniques if you need to lift heavy objects.  · Do back exercises to help  your posture.  · Lie on your back when you have pain.  · Ask your healthcare provider about these and other ways to help your spine.  Date Last Reviewed: 10/17/2015  © 3646-3709 [employer]. [address] [place] [address], [place] [address]. All rights reserved. This information is not intended as a substitute for professional medical care. Always follow your healthcare professional's instructions.

## 2019-12-04 NOTE — PATIENT INSTRUCTIONS
- Schedule for a bilateral genicular nerve blocks for diagnostic purposes.  - Continue current medications as prescribed.  - Continue home based exercises and discussed the importance of a healthy and active lifestyle  - will call following the diagnostic blocks to determine future plan of care    Please do not hesitate to contact the clinic if you have any questions regarding your treatment plan.     Butch Chao MD  Interventional Pain Medicine  [facility]         Pain Management Pre-Procedure Instructions  (also available in your MyChart account)    Patient Name:___Carisa Curry____MRN: [id number] you are scheduled to have the following procedure:__ Nerve Block  _with______Butch Chao MD on: ___12/09/2019____ at: [facility]    You will be contacted the day before your procedure to be given an arrival and procedure time                                                                                                            Day of Procedure   Ensure you have obtained arrival time from the Pain Management department  o We will call 48 hours in advance with your arrival time. Please check any voicemails you may have  o If you arrive past your scheduled procedure time, you may be asked to reschedule your procedure.   For your safety, ensure you have a  with you to remain present throughout your procedure   o If you arrive without a responsible adult to stay with you and drive you home, you may be asked to reschedule your procedure   Take all of your prescribed medications (exceptions noted below) with a small amount of water  o DO NOT HAVE TO STOP MEDS  o [x] Nothing by mouth after midnight the night before your procedure.  It is ok to take your regular medications with a small sip of water.     Wear loose, comfortable clothing    You may wear glasses, dentures, contact lenses and/or hearing aids. Please leave all valuable items at home.   Contact the Pain  Management department at [phone] or via [employer] if you are:  o Running a fever above 100 degrees  o Feel ill, have any type of infection, or are taking antibiotics now or have in the past 2 weeks  o Have had any outpatient procedures in the past 2 weeks (colonoscopy, major dental work, etc.)  o If you are allergic to iodine, IVP dye or shellfish.      Contact Information: [phone], ask to speak to the pain management department with any questions or concerns or send a message via [employer]

## 2019-12-04 NOTE — PRE-PROCEDURE INSTRUCTIONS
Spoke with patient's sister casey     regarding procedure scheduled on 12/9/19  Arrival time 0730  Has patient been sick with fever or on antibiotics within the last 7 days? no  Has patient received a vaccination within the last 7 days? Yes, flu vaccine 12/4/19. Dr. Chao ok with it because we will not be using steroids for her bilateral genicular injection  Has the patient stopped all medications as directed? Not required  Does patient have a pacemaker and or defibrillator? no  Does the patient have a ride to and from procedure and someone reliable to remain with patient? Yes, sister  Is the patient diabetic? yes  Does the patient have sleep apnea? No Or use O2 at home? No  Is the patient receiving sedation? yes  Is the patient instructed to remain NPO beginning at midnight the night before their procedure? yes  Procedure location confirmed with patient? yes

## 2019-12-09 ENCOUNTER — HOSPITAL ENCOUNTER (OUTPATIENT)
Facility: HOSPITAL | Age: 64
Discharge: HOME | End: 2019-12-09
Attending: PHYSICAL MEDICINE & REHABILITATION | Admitting: PHYSICAL MEDICINE & REHABILITATION
Payer: MEDICARE

## 2019-12-09 VITALS
SYSTOLIC BLOOD PRESSURE: 161 MMHG | OXYGEN SATURATION: 98 % | HEART RATE: 82 BPM | BODY MASS INDEX: 38.09 KG/M2 | RESPIRATION RATE: 16 BRPM | HEIGHT: 66 IN | WEIGHT: 237 LBS | DIASTOLIC BLOOD PRESSURE: 70 MMHG | TEMPERATURE: 97 F

## 2019-12-09 DIAGNOSIS — G89.29 KNEE PAIN, CHRONIC: ICD-10-CM

## 2019-12-09 DIAGNOSIS — M25.569 KNEE PAIN, CHRONIC: ICD-10-CM

## 2019-12-09 LAB — POCT GLUCOSE: 127 MG/DL (ref 70–110)

## 2019-12-09 PROCEDURE — 63600175 PHARM REV CODE 636 W HCPCS: Performed by: PHYSICAL MEDICINE & REHABILITATION

## 2019-12-09 PROCEDURE — 99152 MOD SED SAME PHYS/QHP 5/>YRS: CPT | Mod: ,,, | Performed by: PHYSICAL MEDICINE & REHABILITATION

## 2019-12-09 PROCEDURE — 99152 PR MOD CONSCIOUS SEDATION, SAME PHYS, 5+ YRS, FIRST 15 MIN: ICD-10-PCS | Mod: ,,, | Performed by: PHYSICAL MEDICINE & REHABILITATION

## 2019-12-09 PROCEDURE — 64450 NJX AA&/STRD OTHER PN/BRANCH: CPT | Mod: 50 | Performed by: PHYSICAL MEDICINE & REHABILITATION

## 2019-12-09 PROCEDURE — 64450 PR NERVE BLOCK INJ, ANES/STEROID, OTHER PERIPHERAL: ICD-10-PCS | Mod: 50,,, | Performed by: PHYSICAL MEDICINE & REHABILITATION

## 2019-12-09 PROCEDURE — S0020 INJECTION, BUPIVICAINE HYDRO: HCPCS | Performed by: PHYSICAL MEDICINE & REHABILITATION

## 2019-12-09 PROCEDURE — 64450 NJX AA&/STRD OTHER PN/BRANCH: CPT | Mod: 50,,, | Performed by: PHYSICAL MEDICINE & REHABILITATION

## 2019-12-09 PROCEDURE — 25000003 PHARM REV CODE 250: Performed by: PHYSICAL MEDICINE & REHABILITATION

## 2019-12-09 RX ORDER — ONDANSETRON 2 MG/ML
4 INJECTION INTRAMUSCULAR; INTRAVENOUS ONCE AS NEEDED
Status: DISCONTINUED | OUTPATIENT
Start: 2019-12-09 | End: 2019-12-09 | Stop reason: HOSPADM

## 2019-12-09 RX ORDER — HYDRALAZINE HYDROCHLORIDE 20 MG/ML
10 INJECTION INTRAMUSCULAR; INTRAVENOUS ONCE
Status: COMPLETED | OUTPATIENT
Start: 2019-12-09 | End: 2019-12-09

## 2019-12-09 RX ORDER — BUPIVACAINE HYDROCHLORIDE 5 MG/ML
INJECTION, SOLUTION EPIDURAL; INTRACAUDAL
Status: DISCONTINUED | OUTPATIENT
Start: 2019-12-09 | End: 2019-12-09 | Stop reason: HOSPADM

## 2019-12-09 RX ORDER — FENTANYL CITRATE 50 UG/ML
INJECTION, SOLUTION INTRAMUSCULAR; INTRAVENOUS
Status: DISCONTINUED | OUTPATIENT
Start: 2019-12-09 | End: 2019-12-09 | Stop reason: HOSPADM

## 2019-12-09 RX ORDER — MIDAZOLAM HYDROCHLORIDE 1 MG/ML
INJECTION, SOLUTION INTRAMUSCULAR; INTRAVENOUS
Status: DISCONTINUED | OUTPATIENT
Start: 2019-12-09 | End: 2019-12-09 | Stop reason: HOSPADM

## 2019-12-09 RX ORDER — LIDOCAINE HYDROCHLORIDE 10 MG/ML
INJECTION, SOLUTION EPIDURAL; INFILTRATION; INTRACAUDAL; PERINEURAL
Status: DISCONTINUED | OUTPATIENT
Start: 2019-12-09 | End: 2019-12-09 | Stop reason: HOSPADM

## 2019-12-09 RX ADMIN — HYDRALAZINE HYDROCHLORIDE 10 MG: 20 INJECTION INTRAMUSCULAR; INTRAVENOUS at 07:12

## 2019-12-09 NOTE — INTERVAL H&P NOTE
The patient has been examined and the H&P has been reviewed:    I concur with the findings and no changes have occurred since H&P was written.    Anesthesia/Surgery risks, benefits and alternative options discussed and understood by patient/family.          Active Hospital Problems    Diagnosis  POA    Knee pain, chronic [M25.569, G89.29]  Yes      Resolved Hospital Problems   No resolved problems to display.       Plan:  Proceed with bilateral genicular nerve blocks (DIAGNOSTIC, NO STEROID)    Butch Chao MD  Interventional Pain Medicine  [facility] [place]

## 2019-12-09 NOTE — OP NOTE
Procedure Note:    Bilateral  Geniculate nerve block under fluoroscopy                               Surgeon: Butch Chao MD  Assistant: None    Sedation: Conscious sedation provided by M.D    The patient was monitored with continuous pulse oximetry, EKG, and intermittent blood pressure monitors.  The patient was hemodynamically stable throughout the entire process was responsive to voice, and breathing spontaneously.  Supplemental O2 was provided at 2L/min via nasal cannula.  Patient was comfortable for the duration of the procedure. (See nurse documentation and case log for sedation time)    There was a total of 1mg IV Midazolam and 25mcg Fentanyl titrated for the procedure      Pre-Op Diagnosis:  Bilateral  Primary osteoarthritis of left knee [M17.12]  Primary osteoarthritis of right knee [M17.11]  Chronic pain of both knees [M25.561, M25.562, G89.29]    Post-Op Diagnosis: Primary osteoarthritis of left knee [M17.12]  Primary osteoarthritis of right knee [M17.11]  Chronic pain of both knees [M25.561, M25.562, G89.29]    EBL: None  Complications: None  Specimens: None    Description of procedure:  After written consent was obtained, patient placed in supine position.  The area over the medial and lateral aspect of the superior epi-condyle of the femur and the medial tibial metaphysis were prepped with chlorhexidine.  The area was draped in the usual sterile fashion.  Approximately 8 mL total 1% lidocaine was infiltrated into the skin overlying the 3 predetermined entry points. A 25 gauge spinal needle was then advanced under fluoroscopy in the AP and lateral views into the positions of the geniculate nerves at these levels. After negative aspiration and no paresthesias there was injection of 2 mL of 0.25% bupivacaine into each of these 3 areas for a total volume of 6 mL of 0.25% bupivacaine per side. Needle was withdrawn and a sterile band-aid applied to the skin.    Patient tolerated the procedure well, and  was reporting improvement of pain symptoms after the injection.  She was discharged from the clinic in stable condition.

## 2019-12-09 NOTE — DISCHARGE INSTRUCTIONS

## 2019-12-09 NOTE — PLAN OF CARE
Discharge instructions reviewed with pt and sister. States understanding and has no further questions. Pt AAOx4, stable

## 2019-12-09 NOTE — DISCHARGE SUMMARY
Discharge Note  Short Stay      SUMMARY     Admit Date: 12/9/2019    Attending Physician: Butch Chao MD        Discharge Physician: Butch Chao MD        Discharge Date: 12/9/2019 9:03 AM    Procedure(s) (LRB):  Bilateral Genicular nerve block (DIAGNOSTIC, NO STEROID) with RN IV sedation (Bilateral)    Final Diagnosis: Primary osteoarthritis of left knee [M17.12]  Primary osteoarthritis of right knee [M17.11]  Chronic pain of both knees [M25.561, M25.562, G89.29]    Disposition: Home or self care    Patient Instructions:   Current Discharge Medication List      CONTINUE these medications which have NOT CHANGED    Details   alendronate (FOSAMAX) 70 MG tablet Take 1 tablet (70 mg total) by mouth every 7 days.  Qty: 4 tablet, Refills: 11    Associated Diagnoses: Age-related osteoporosis without current pathological fracture      amLODIPine (NORVASC) 10 MG tablet Take 1 tablet (10 mg total) by mouth once daily.  Qty: 90 tablet, Refills: 1    Associated Diagnoses: Hypertension goal BP (blood pressure) < 130/80      ARIPiprazole (ABILIFY) 10 MG Tab Take 1 tablet (10 mg total) by mouth once daily.  Qty: 30 tablet, Refills: 0    Comments: NEEDS RUSH PA PLEASE      cholecalciferol, vitamin D3, (VITAMIN D3) 25 mcg (1,000 unit) capsule Take 1 capsule (1,000 Units total) by mouth once daily.  Qty: 30 capsule, Refills: 11    Associated Diagnoses: Age-related osteoporosis without current pathological fracture      dulaglutide (TRULICITY) 1.5 mg/0.5 mL PnIj Inject 1.5 mg into the skin every 7 days.  Qty: 12 mL, Refills: 1    Associated Diagnoses: Controlled type 2 diabetes mellitus with microalbuminuria, without long-term current use of insulin      hydroCHLOROthiazide (HYDRODIURIL) 25 MG tablet Take 1 tablet (25 mg total) by mouth once daily.  Qty: 30 tablet, Refills: 3    Associated Diagnoses: Hypertension goal BP (blood pressure) < 130/80      losartan (COZAAR) 100 MG tablet Take 1 tablet (100 mg total) by mouth  "once daily.  Qty: 30 tablet, Refills: 3    Associated Diagnoses: Hypertension goal BP (blood pressure) < 130/80      meloxicam (MOBIC) 15 MG tablet Take 1 tablet (15 mg total) by mouth once daily. Take with food.  Discontinue if you develop GI side effects.  Qty: 30 tablet, Refills: 1    Associated Diagnoses: Primary osteoarthritis of left knee; Primary osteoarthritis of right knee; Chronic pain of left knee; Chronic pain of right knee      metFORMIN (GLUCOPHAGE) 1000 MG tablet Take 1 tablet by mouth two times daily with meals.  Qty: 180 tablet, Refills: 1    Associated Diagnoses: Controlled type 2 diabetes mellitus with microalbuminuria, without long-term current use of insulin      metoprolol succinate (TOPROL-XL) 25 MG 24 hr tablet Take 1 tablet (25 mg total) by mouth once daily.  Qty: 90 tablet, Refills: 1    Associated Diagnoses: Hypertension goal BP (blood pressure) < 130/80      potassium chloride (KLOR-CON) 10 MEQ TbSR Take 1 tablet (10 mEq total) by mouth once daily.  Qty: 90 tablet, Refills: 1    Associated Diagnoses: Diuretic-induced hypokalemia      pravastatin (PRAVACHOL) 40 MG tablet Take 1 tablet (40 mg total) by mouth once daily.  Qty: 90 tablet, Refills: 1    Associated Diagnoses: Hyperlipidemia LDL goal <70      promethazine-dextromethorphan (PROMETHAZINE-DM) 6.25-15 mg/5 mL Syrp Take 5 mLs by mouth every 8 (eight) hours as needed.  Qty: 118 mL, Refills: 0    Associated Diagnoses: Cough      blood sugar diagnostic Strp Twice daily glucose testing.  Qty: 60 strip, Refills: 6    Associated Diagnoses: Uncontrolled type 2 diabetes mellitus with microalbuminuria or microproteinuria      blood-glucose meter kit Use as instructed  Qty: 1 each, Refills: 0    Associated Diagnoses: Uncontrolled type 2 diabetes mellitus with microalbuminuria or microproteinuria      insulin needles, disposable, (PEN NEEDLE) 31 X 5/16 " Ndle Once a day insulin injection.  Qty: 30 each, Refills: 6    Associated Diagnoses: " Uncontrolled type 2 diabetes mellitus with microalbuminuria or microproteinuria      lancets Misc Twice daily glucose monitoring.  Qty: 60 each, Refills: 6    Associated Diagnoses: Uncontrolled type 2 diabetes mellitus with microalbuminuria or microproteinuria      nystatin (MYCOSTATIN) powder Apply topically 4 (four) times daily.  Qty: 60 g, Refills: 1    Associated Diagnoses: Intertriginous candidiasis                 Discharge Diagnosis: Primary osteoarthritis of left knee [M17.12]  Primary osteoarthritis of right knee [M17.11]  Chronic pain of both knees [M25.561, M25.562, G89.29]  Condition on Discharge: Stable with no complications to procedure   Diet on Discharge: Same as before.  Activity: as per instruction sheet.  Discharge to: Home with a responsible adult.  Follow up: 2-4 weeks       Please call the office if you experience any weakness or loss of sensation, fever > 101.5, pain uncontrolled with oral medications, persistent nausea/vomiting/or diarrhea, redness or drainage from the incisions, or any other worrisome concerns. If physician on call was not reached or could not communicate with our office for any reason please go to the nearest emergency department

## 2019-12-10 ENCOUNTER — TELEPHONE (OUTPATIENT)
Dept: PAIN MEDICINE | Facility: CLINIC | Age: 64
End: 2019-12-10

## 2019-12-10 DIAGNOSIS — M17.11 PRIMARY OSTEOARTHRITIS OF RIGHT KNEE: Primary | ICD-10-CM

## 2019-12-10 DIAGNOSIS — M17.12 PRIMARY OSTEOARTHRITIS OF LEFT KNEE: ICD-10-CM

## 2019-12-10 DIAGNOSIS — G89.29 CHRONIC PAIN OF BOTH KNEES: ICD-10-CM

## 2019-12-10 DIAGNOSIS — M25.562 CHRONIC PAIN OF BOTH KNEES: ICD-10-CM

## 2019-12-10 DIAGNOSIS — M25.561 CHRONIC PAIN OF BOTH KNEES: ICD-10-CM

## 2019-12-10 NOTE — TELEPHONE ENCOUNTER
Contacted patient to check relief of diagnostic injection with Dr. Chao on 12/09/2019. Patient reports 80% relief from injection. Will notify Dr. Chao of patient's relief.

## 2019-12-11 ENCOUNTER — TELEPHONE (OUTPATIENT)
Dept: PAIN MEDICINE | Facility: CLINIC | Age: 64
End: 2019-12-11

## 2019-12-12 ENCOUNTER — TELEPHONE (OUTPATIENT)
Dept: PAIN MEDICINE | Facility: CLINIC | Age: 64
End: 2019-12-12

## 2019-12-12 NOTE — TELEPHONE ENCOUNTER
Contacted pt. Appt for procedure scheduled Dec. 23 2019 and Jan . 6,2020 with . Went over instructions with pt and pt verbalized understanding.Instructions also mailed to pt.  All questions answered.

## 2019-12-16 NOTE — PRE-PROCEDURE INSTRUCTIONS
Spoke with  Patients sister Anjali     regarding procedure scheduled on 12/23/19  Arrival time  730  Has patient been sick with fever or on antibiotics within the last 7 days?no  Has patient received a vaccination within the last 7 days? no  Has the patient stopped all medications as directed? Yes, anjali manages patients medications. Instructed to hold her meloxican, vitamin D, calcium and potassium from 12/16/19 until procedure. Also instructed to hold her diabetic medication the morning of since she will be NPO, but to be sure and give her, her blood pressure medication with a small sip of water. Anjali stated understanding.   Does patient have a pacemaker and or defibrillator? no  Does the patient have a ride to and from procedure and someone reliable to remain with patient? Yes, sister Anjali  Is the patient diabetic? yes  Does the patient have sleep apnea? Or use O2 at home? No, no  Is the patient receiving sedation? yes  Is the patient instructed to remain NPO beginning at midnight the night before their procedure? yes  Procedure location confirmed with patient? yes

## 2019-12-19 ENCOUNTER — OFFICE VISIT (OUTPATIENT)
Dept: PSYCHIATRY | Facility: CLINIC | Age: 64
End: 2019-12-19
Payer: MEDICARE

## 2019-12-19 VITALS
HEART RATE: 75 BPM | BODY MASS INDEX: 38.79 KG/M2 | SYSTOLIC BLOOD PRESSURE: 163 MMHG | DIASTOLIC BLOOD PRESSURE: 78 MMHG | WEIGHT: 240.31 LBS

## 2019-12-19 DIAGNOSIS — F20.9 SCHIZOPHRENIA, UNSPECIFIED TYPE: Primary | ICD-10-CM

## 2019-12-19 PROCEDURE — 99213 OFFICE O/P EST LOW 20 MIN: CPT | Mod: S$PBB,,, | Performed by: PSYCHIATRY & NEUROLOGY

## 2019-12-19 PROCEDURE — 99212 OFFICE O/P EST SF 10 MIN: CPT | Mod: PBBFAC | Performed by: PSYCHIATRY & NEUROLOGY

## 2019-12-19 PROCEDURE — 99999 PR PBB SHADOW E&M-EST. PATIENT-LVL II: CPT | Mod: PBBFAC,,, | Performed by: PSYCHIATRY & NEUROLOGY

## 2019-12-19 PROCEDURE — 99213 PR OFFICE/OUTPT VISIT, EST, LEVL III, 20-29 MIN: ICD-10-PCS | Mod: S$PBB,,, | Performed by: PSYCHIATRY & NEUROLOGY

## 2019-12-19 PROCEDURE — 99999 PR PBB SHADOW E&M-EST. PATIENT-LVL II: ICD-10-PCS | Mod: PBBFAC,,, | Performed by: PSYCHIATRY & NEUROLOGY

## 2019-12-19 RX ORDER — ARIPIPRAZOLE 10 MG/1
10 TABLET ORAL DAILY
Qty: 30 TABLET | Refills: 3 | Status: SHIPPED | OUTPATIENT
Start: 2019-12-19 | End: 2020-04-21 | Stop reason: SDUPTHER
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
## 2019-12-23 ENCOUNTER — HOSPITAL ENCOUNTER (OUTPATIENT)
Facility: HOSPITAL | Age: 64
Discharge: HOME | End: 2019-12-23
Attending: PHYSICAL MEDICINE & REHABILITATION | Admitting: PHYSICAL MEDICINE & REHABILITATION
Payer: MEDICARE

## 2019-12-23 VITALS
HEIGHT: 67 IN | HEART RATE: 69 BPM | TEMPERATURE: 98 F | BODY MASS INDEX: 37.29 KG/M2 | DIASTOLIC BLOOD PRESSURE: 68 MMHG | SYSTOLIC BLOOD PRESSURE: 145 MMHG | RESPIRATION RATE: 14 BRPM | OXYGEN SATURATION: 100 % | WEIGHT: 237.56 LBS

## 2019-12-23 DIAGNOSIS — M25.569 KNEE PAIN, CHRONIC: ICD-10-CM

## 2019-12-23 DIAGNOSIS — G89.29 KNEE PAIN, CHRONIC: ICD-10-CM

## 2019-12-23 LAB — POCT GLUCOSE: 125 MG/DL (ref 70–110)

## 2019-12-23 PROCEDURE — 99152 MOD SED SAME PHYS/QHP 5/>YRS: CPT | Mod: ,,, | Performed by: PHYSICAL MEDICINE & REHABILITATION

## 2019-12-23 PROCEDURE — 25000003 PHARM REV CODE 250: Performed by: PHYSICAL MEDICINE & REHABILITATION

## 2019-12-23 PROCEDURE — 64640 INJECTION TREATMENT OF NERVE: CPT | Mod: RT,,, | Performed by: PHYSICAL MEDICINE & REHABILITATION

## 2019-12-23 PROCEDURE — 63600175 PHARM REV CODE 636 W HCPCS: Performed by: PHYSICAL MEDICINE & REHABILITATION

## 2019-12-23 PROCEDURE — 64640 INJECTION TREATMENT OF NERVE: CPT | Performed by: PHYSICAL MEDICINE & REHABILITATION

## 2019-12-23 PROCEDURE — 99152 PR MOD CONSCIOUS SEDATION, SAME PHYS, 5+ YRS, FIRST 15 MIN: ICD-10-PCS | Mod: ,,, | Performed by: PHYSICAL MEDICINE & REHABILITATION

## 2019-12-23 PROCEDURE — 64640 PR DESTRUCT BY NEURO AGENT; OTHER PERIPH NERVE: ICD-10-PCS | Mod: RT,,, | Performed by: PHYSICAL MEDICINE & REHABILITATION

## 2019-12-23 PROCEDURE — 82962 GLUCOSE BLOOD TEST: CPT | Performed by: PHYSICAL MEDICINE & REHABILITATION

## 2019-12-23 RX ORDER — FENTANYL CITRATE 50 UG/ML
INJECTION, SOLUTION INTRAMUSCULAR; INTRAVENOUS
Status: DISCONTINUED | OUTPATIENT
Start: 2019-12-23 | End: 2019-12-23 | Stop reason: HOSPADM

## 2019-12-23 RX ORDER — BUPIVACAINE HYDROCHLORIDE 2.5 MG/ML
INJECTION, SOLUTION EPIDURAL; INFILTRATION; INTRACAUDAL
Status: DISCONTINUED | OUTPATIENT
Start: 2019-12-23 | End: 2019-12-23 | Stop reason: HOSPADM

## 2019-12-23 RX ORDER — MIDAZOLAM HYDROCHLORIDE 1 MG/ML
INJECTION, SOLUTION INTRAMUSCULAR; INTRAVENOUS
Status: DISCONTINUED | OUTPATIENT
Start: 2019-12-23 | End: 2019-12-23 | Stop reason: HOSPADM

## 2019-12-23 RX ORDER — ONDANSETRON 2 MG/ML
4 INJECTION INTRAMUSCULAR; INTRAVENOUS ONCE AS NEEDED
Status: DISCONTINUED | OUTPATIENT
Start: 2019-12-23 | End: 2019-12-23 | Stop reason: HOSPADM

## 2019-12-23 NOTE — H&P
"HPI  Patient presenting for Procedure(s) (LRB):  Right Genicular Nerve RFA (COOLED) with RN IV sedation (Right)     Patient on Anti-coagulation No    No health changes since previous encounter    Past Medical History:   Diagnosis Date    Arthritis     CHF (congestive heart failure)     Diabetes mellitus, type 2     Diabetic retinopathy     Hyperlipidemia     Hypertension     Schizophrenia     Seizures     reported per pt and sister, nothing since childhood     Past Surgical History:   Procedure Laterality Date    EYE SURGERY      INJECTION OF ANESTHETIC AGENT AROUND NERVE Bilateral 12/9/2019    Procedure: Bilateral Genicular nerve block (DIAGNOSTIC, NO STEROID) with RN IV sedation;  Surgeon: Butch Chao MD;  Location: [facility];  Service: Pain Management;  Laterality: Bilateral;    SPINE SURGERY       Review of patient's allergies indicates:  No Known Allergies   Current Facility-Administered Medications   Medication    ondansetron injection 4 mg       PMHx, PSHx, Allergies, Medications reviewed in epic    ROS negative except pain complaints in HPI    OBJECTIVE:    BP (!) 184/85 (BP Location: Right arm, Patient Position: Sitting)   Pulse 86   Temp 98.7 °F (37.1 °C) (Oral)   Resp 18   Ht 5' 7" (1.702 m)   Wt 107.7 kg (237 lb 8.7 oz)   SpO2 99%   Breastfeeding? No   BMI 37.20 kg/m²     PHYSICAL EXAMINATION:    GENERAL: Well appearing, in no acute distress, alert and oriented x3.  PSYCH:  Mood and affect appropriate.  SKIN: Skin color, texture, turgor normal, no rashes or lesions which will impact the procedure.  CV: RRR with palpation of the radial artery.  PULM: No evidence of respiratory difficulty, symmetric chest rise. Clear to auscultation.  NEURO: Cranial nerves grossly intact.    Plan:    Proceed with procedure as planned Procedure(s) (LRB):  Right Genicular Nerve RFA (COOLED) with RN IV sedation (Right)    Butch Chao MD  12/23/2019            "

## 2019-12-23 NOTE — DISCHARGE SUMMARY
Discharge Note  Short Stay      SUMMARY     Admit Date: 12/23/2019    Attending Physician: Butch Chao MD        Discharge Physician: Butch Chao MD        Discharge Date: 12/23/2019 9:15 AM    Procedure(s) (LRB):  Right Genicular Nerve RFA (COOLED) with RN IV sedation (Right)    Final Diagnosis: Primary osteoarthritis of right knee [M17.11]  Chronic pain of both knees [M25.561, M25.562, G89.29]    Disposition: Home or self care    Patient Instructions:   Current Discharge Medication List      CONTINUE these medications which have NOT CHANGED    Details   alendronate (FOSAMAX) 70 MG tablet Take 1 tablet (70 mg total) by mouth every 7 days.  Qty: 4 tablet, Refills: 11    Associated Diagnoses: Age-related osteoporosis without current pathological fracture      amLODIPine (NORVASC) 10 MG tablet Take 1 tablet (10 mg total) by mouth once daily.  Qty: 90 tablet, Refills: 1    Associated Diagnoses: Hypertension goal BP (blood pressure) < 130/80      ARIPiprazole (ABILIFY) 10 MG Tab Take 1 tablet (10 mg total) by mouth once daily.  Qty: 30 tablet, Refills: 3    Comments: NEEDS RUSH PA PLEASE      dulaglutide (TRULICITY) 1.5 mg/0.5 mL PnIj Inject 1.5 mg into the skin every 7 days.  Qty: 12 mL, Refills: 1    Associated Diagnoses: Controlled type 2 diabetes mellitus with microalbuminuria, without long-term current use of insulin      hydroCHLOROthiazide (HYDRODIURIL) 25 MG tablet Take 1 tablet (25 mg total) by mouth once daily.  Qty: 30 tablet, Refills: 3    Associated Diagnoses: Hypertension goal BP (blood pressure) < 130/80      losartan (COZAAR) 100 MG tablet Take 1 tablet (100 mg total) by mouth once daily.  Qty: 30 tablet, Refills: 3    Associated Diagnoses: Hypertension goal BP (blood pressure) < 130/80      metFORMIN (GLUCOPHAGE) 1000 MG tablet Take 1 tablet by mouth two times daily with meals.  Qty: 180 tablet, Refills: 1    Associated Diagnoses: Controlled type 2 diabetes mellitus with microalbuminuria,  "without long-term current use of insulin      metoprolol succinate (TOPROL-XL) 25 MG 24 hr tablet Take 1 tablet (25 mg total) by mouth once daily.  Qty: 90 tablet, Refills: 1    Associated Diagnoses: Hypertension goal BP (blood pressure) < 130/80      pravastatin (PRAVACHOL) 40 MG tablet Take 1 tablet (40 mg total) by mouth once daily.  Qty: 90 tablet, Refills: 1    Associated Diagnoses: Hyperlipidemia LDL goal <70      blood sugar diagnostic Strp Twice daily glucose testing.  Qty: 60 strip, Refills: 6    Associated Diagnoses: Uncontrolled type 2 diabetes mellitus with microalbuminuria or microproteinuria      blood-glucose meter kit Use as instructed  Qty: 1 each, Refills: 0    Associated Diagnoses: Uncontrolled type 2 diabetes mellitus with microalbuminuria or microproteinuria      cholecalciferol, vitamin D3, (VITAMIN D3) 25 mcg (1,000 unit) capsule Take 1 capsule (1,000 Units total) by mouth once daily.  Qty: 30 capsule, Refills: 11    Associated Diagnoses: Age-related osteoporosis without current pathological fracture      insulin needles, disposable, (PEN NEEDLE) 31 X 5/16 " Ndle Once a day insulin injection.  Qty: 30 each, Refills: 6    Associated Diagnoses: Uncontrolled type 2 diabetes mellitus with microalbuminuria or microproteinuria      lancets Misc Twice daily glucose monitoring.  Qty: 60 each, Refills: 6    Associated Diagnoses: Uncontrolled type 2 diabetes mellitus with microalbuminuria or microproteinuria      meloxicam (MOBIC) 15 MG tablet Take 1 tablet (15 mg total) by mouth once daily. Take with food.  Discontinue if you develop GI side effects.  Qty: 30 tablet, Refills: 1    Associated Diagnoses: Primary osteoarthritis of left knee; Primary osteoarthritis of right knee; Chronic pain of left knee; Chronic pain of right knee      nystatin (MYCOSTATIN) powder Apply topically 4 (four) times daily.  Qty: 60 g, Refills: 1    Associated Diagnoses: Intertriginous candidiasis      potassium chloride " (KLOR-CON) 10 MEQ TbSR Take 1 tablet (10 mEq total) by mouth once daily.  Qty: 90 tablet, Refills: 1    Associated Diagnoses: Diuretic-induced hypokalemia                 Discharge Diagnosis: Primary osteoarthritis of right knee [M17.11]  Chronic pain of both knees [M25.561, M25.562, G89.29]  Condition on Discharge: Stable with no complications to procedure   Diet on Discharge: Same as before.  Activity: as per instruction sheet.  Discharge to: Home with a responsible adult.  Follow up: 2-4 weeks       Please call the office  if you experience any weakness or loss of sensation, fever > 101.5, pain uncontrolled with oral medications, persistent nausea/vomiting/or diarrhea, redness or drainage from the incisions, or any other worrisome concerns. If physician on call was not reached or could not communicate with our office for any reason please go to the nearest emergency department

## 2019-12-23 NOTE — OP NOTE
Procedure Note:    Right  Geniculate nerve cooled radiofrequency ablation under fluoroscopy     1) medial superior epicondyle geniculate nerve cooled radiofrequency ablation  2) lateral superior epicondyle geniculate nerve cooled radiofrequency ablation  3) medial tibial metaphysis geniculate nerve cooled radiofrequency ablation  4) xray guidance for needle placement  5) Conscious sedation provided by MD                                Surgeon: Butch Chao MD  Assistant: None    Sedation: Conscious sedation provided by M.D    The patient was monitored with continuous pulse oximetry, EKG, and intermittent blood pressure monitors.  The patient was hemodynamically stable throughout the entire process was responsive to voice, and breathing spontaneously.  Supplemental O2 was provided at 2L/min via nasal cannula.  Patient was comfortable for the duration of the procedure. (See nurse documentation and case log for sedation time)    There was a total of 1mg IV Midazolam and 25mcg fentanyl given for the procedure    Pre-Op Diagnosis:  Right  Primary osteoarthritis of right knee [M17.11]  Chronic pain of both knees [M25.561, M25.562, G89.29]    Post-Op Diagnosis: Primary osteoarthritis of right knee [M17.11]  Chronic pain of both knees [M25.561, M25.562, G89.29]    EBL: None  Complications: None  Specimens: None    Description of procedure:  After written consent was obtained, patient placed in supine position.  The area over the medial and lateral aspect of the superior epi-condyle of the femur and the medial tibial metaphysis were prepped with chlorhexidine.  The area was draped in the usual sterile fashion.  Approximately 3 mL total 1% lidocaine was infiltrated into the skin overlying the 3 predetermined entry points. A 17 gauge 10mm active tip needle was then advanced under fluoroscopy in the AP and lateral views into the positions of the geniculate nerves at these levels. This was followed by motor testing at each  of the nerves to ensure that there was no dorsiflexion of the foot.  After negative aspiration and no paresthesias there was injection of 2.5 mL of 0.25% bupivacaine into each of these  3 areas for a total volume of 6 mL of 0.25% bupivacaine. This was followed by cooled thermal lesioning at 80 degrees celsius for 150 seconds at each site.   Needle was withdrawn and a sterile band-aid applied to the skin.        Patient tolerated the procedure well, and was reporting improvement of pain symptoms after the injection.  She was discharged from the clinic in stable condition.

## 2019-12-23 NOTE — PLAN OF CARE
Pt and caregiver verbalized understanding of discharge instructions. Bandaids x 3 to R knee c/d/i. Patient voiced no complaints, with no further questions at this time. Patient stood at side of bed, walked steps with no new motor deficits. Neuro intact. VSS on RA. Recovery care complete.

## 2019-12-30 NOTE — PRE-PROCEDURE INSTRUCTIONS
Spoke with  Patients sister anjali     regarding procedure scheduled on 1/6/20  Arrival time not yet approved, will update  Has patient been sick with fever or on antibiotics within the last 7 days?no   Has patient received a vaccination within the last 7 days? no  Has the patient stopped all medications as directed? Yes, last dose of vitamin D and mobic on 12/30/19  Does patient have a pacemaker and or defibrillator? no  Does the patient have a ride to and from procedure and someone reliable to remain with patient? Yes, sister Anjali  Is the patient diabetic? yes  Does the patient have sleep apnea? Or use O2 at home? No, no  Is the patient receiving sedation? yes  Is the patient instructed to remain NPO beginning at midnight the night before their procedure? yes  Procedure location confirmed with patient? yes

## 2020-01-06 ENCOUNTER — HOSPITAL ENCOUNTER (OUTPATIENT)
Facility: HOSPITAL | Age: 65
Discharge: HOME | End: 2020-01-06
Attending: PHYSICAL MEDICINE & REHABILITATION | Admitting: PHYSICAL MEDICINE & REHABILITATION
Payer: MEDICARE

## 2020-01-06 VITALS
HEIGHT: 65 IN | DIASTOLIC BLOOD PRESSURE: 73 MMHG | OXYGEN SATURATION: 98 % | TEMPERATURE: 98 F | BODY MASS INDEX: 39.01 KG/M2 | SYSTOLIC BLOOD PRESSURE: 169 MMHG | WEIGHT: 234.13 LBS | RESPIRATION RATE: 16 BRPM | HEART RATE: 65 BPM

## 2020-01-06 DIAGNOSIS — G89.29 KNEE PAIN, CHRONIC: ICD-10-CM

## 2020-01-06 DIAGNOSIS — M25.569 KNEE PAIN, CHRONIC: ICD-10-CM

## 2020-01-06 LAB — POCT GLUCOSE: 77 MG/DL (ref 70–110)

## 2020-01-06 PROCEDURE — 25000003 PHARM REV CODE 250: Performed by: PHYSICAL MEDICINE & REHABILITATION

## 2020-01-06 PROCEDURE — 82962 GLUCOSE BLOOD TEST: CPT | Performed by: PHYSICAL MEDICINE & REHABILITATION

## 2020-01-06 PROCEDURE — 99152 MOD SED SAME PHYS/QHP 5/>YRS: CPT | Performed by: PHYSICAL MEDICINE & REHABILITATION

## 2020-01-06 PROCEDURE — 99153 MOD SED SAME PHYS/QHP EA: CPT | Performed by: PHYSICAL MEDICINE & REHABILITATION

## 2020-01-06 PROCEDURE — 64640 INJECTION TREATMENT OF NERVE: CPT | Mod: LT,,, | Performed by: PHYSICAL MEDICINE & REHABILITATION

## 2020-01-06 PROCEDURE — 64640 PR DESTRUCT BY NEURO AGENT; OTHER PERIPH NERVE: ICD-10-PCS | Mod: LT,,, | Performed by: PHYSICAL MEDICINE & REHABILITATION

## 2020-01-06 PROCEDURE — 99152 PR MOD CONSCIOUS SEDATION, SAME PHYS, 5+ YRS, FIRST 15 MIN: ICD-10-PCS | Mod: ,,, | Performed by: PHYSICAL MEDICINE & REHABILITATION

## 2020-01-06 PROCEDURE — 64640 INJECTION TREATMENT OF NERVE: CPT | Performed by: PHYSICAL MEDICINE & REHABILITATION

## 2020-01-06 PROCEDURE — 99152 MOD SED SAME PHYS/QHP 5/>YRS: CPT | Mod: ,,, | Performed by: PHYSICAL MEDICINE & REHABILITATION

## 2020-01-06 PROCEDURE — 63600175 PHARM REV CODE 636 W HCPCS: Performed by: PHYSICAL MEDICINE & REHABILITATION

## 2020-01-06 RX ORDER — FENTANYL CITRATE 50 UG/ML
INJECTION, SOLUTION INTRAMUSCULAR; INTRAVENOUS
Status: DISCONTINUED | OUTPATIENT
Start: 2020-01-06 | End: 2020-01-06 | Stop reason: HOSPADM

## 2020-01-06 RX ORDER — BUPIVACAINE HYDROCHLORIDE 2.5 MG/ML
INJECTION, SOLUTION EPIDURAL; INFILTRATION; INTRACAUDAL
Status: DISCONTINUED | OUTPATIENT
Start: 2020-01-06 | End: 2020-01-06 | Stop reason: HOSPADM

## 2020-01-06 RX ORDER — ONDANSETRON 2 MG/ML
4 INJECTION INTRAMUSCULAR; INTRAVENOUS ONCE AS NEEDED
Status: DISCONTINUED | OUTPATIENT
Start: 2020-01-06 | End: 2020-01-06 | Stop reason: HOSPADM

## 2020-01-06 RX ORDER — MIDAZOLAM HYDROCHLORIDE 1 MG/ML
INJECTION, SOLUTION INTRAMUSCULAR; INTRAVENOUS
Status: DISCONTINUED | OUTPATIENT
Start: 2020-01-06 | End: 2020-01-06 | Stop reason: HOSPADM

## 2020-01-06 RX ORDER — LIDOCAINE HYDROCHLORIDE 10 MG/ML
INJECTION, SOLUTION EPIDURAL; INFILTRATION; INTRACAUDAL; PERINEURAL
Status: DISCONTINUED | OUTPATIENT
Start: 2020-01-06 | End: 2020-01-06 | Stop reason: HOSPADM

## 2020-01-06 NOTE — PLAN OF CARE
Discharge instructions reviewed with patient and caretaker, both verbalized understanding. Injection sites to left knee are clean, dry and intact. Voiced no complaints. Discharging home with vidal.

## 2020-01-06 NOTE — H&P
"HPI  Patient presenting for Procedure(s) (LRB):  Left Genicular Nerve RFA (COOLED) with RN IV sedation (Left)     Patient on Anti-coagulation No    No health changes since previous encounter    Past Medical History:   Diagnosis Date    Arthritis     CHF (congestive heart failure)     Diabetes mellitus, type 2     Diabetic retinopathy     Hyperlipidemia     Hypertension     Schizophrenia     Seizures     reported per pt and sister, nothing since childhood     Past Surgical History:   Procedure Laterality Date    EYE SURGERY      INJECTION OF ANESTHETIC AGENT AROUND NERVE Bilateral 12/9/2019    Procedure: Bilateral Genicular nerve block (DIAGNOSTIC, NO STEROID) with RN IV sedation;  Surgeon: Butch Chao MD;  Location: [facility];  Service: Pain Management;  Laterality: Bilateral;    RADIOFREQUENCY THERMOCOAGULATION Right 12/23/2019    Procedure: Right Genicular Nerve RFA (COOLED) with RN IV sedation;  Surgeon: Butch Chao MD;  Location: [facility];  Service: Pain Management;  Laterality: Right;    SPINE SURGERY       Review of patient's allergies indicates:  No Known Allergies   Current Facility-Administered Medications   Medication    ondansetron injection 4 mg       PMHx, PSHx, Allergies, Medications reviewed in epic    ROS negative except pain complaints in HPI    OBJECTIVE:    BP (!) 184/81 (BP Location: Right arm, Patient Position: Sitting)   Pulse 76   Temp 97.7 °F (36.5 °C) (Skin)   Resp 16   Ht 5' 5" (1.651 m)   Wt 106.2 kg (234 lb 2.1 oz)   SpO2 97%   Breastfeeding? No   BMI 38.96 kg/m²     PHYSICAL EXAMINATION:    GENERAL: Well appearing, in no acute distress, alert and oriented x3.  PSYCH:  Mood and affect appropriate.  SKIN: Skin color, texture, turgor normal, no rashes or lesions which will impact the procedure.  CV: RRR with palpation of the radial artery.  PULM: No evidence of respiratory difficulty, symmetric chest rise. Clear to auscultation.  NEURO: Cranial " nerves grossly intact.    Plan:    Proceed with procedure as planned Procedure(s) (LRB):  Left Genicular Nerve RFA (COOLED) with RN IV sedation (Left)    Butch Chao MD  01/06/2020

## 2020-01-06 NOTE — DISCHARGE INSTRUCTIONS

## 2020-01-06 NOTE — OP NOTE
Procedure Note:    Left  Geniculate nerve cooled radiofrequency ablation under fluoroscopy     1) medial superior epicondyle geniculate nerve cooled radiofrequency ablation  2) lateral superior epicondyle geniculate nerve cooled radiofrequency ablation  3) medial tibial metaphysis geniculate nerve cooled radiofrequency ablation  4) xray guidance for needle placement  5) Conscious sedation provided by MD                                Surgeon: Butch Chao MD  Assistant: None    Sedation: Conscious sedation provided by M.D    The patient was monitored with continuous pulse oximetry, EKG, and intermittent blood pressure monitors.  The patient was hemodynamically stable throughout the entire process was responsive to voice, and breathing spontaneously.  Supplemental O2 was provided at 2L/min via nasal cannula.  Patient was comfortable for the duration of the procedure. (See nurse documentation and case log for sedation time)    There was a total of 3mg IV Midazolam and 25mcg fentanyl given for the procedure    Pre-Op Diagnosis:  Left  Primary osteoarthritis of left knee [M17.12]  Chronic pain of both knees [M25.561, M25.562, G89.29]    Post-Op Diagnosis: Primary osteoarthritis of left knee [M17.12]  Chronic pain of both knees [M25.561, M25.562, G89.29]    EBL: None  Complications: None  Specimens: None    Description of procedure:    After written consent was obtained, patient placed in supine position.  The area over the medial and lateral aspect of the superior epi-condyle of the femur and the medial tibial metaphysis were prepped with chlorhexidine.  The area was draped in the usual sterile fashion.  Approximately 3 mL total 1% lidocaine was infiltrated into the skin overlying the 3 predetermined entry points. A 17 gauge 10mm active tip needle was then advanced under fluoroscopy in the AP and lateral views into the positions of the geniculate nerves at these levels. This was followed by motor testing at each of  the nerves to ensure that there was no dorsiflexion of the foot.  After negative aspiration and no paresthesias there was injection of 2.5 mL of 0.25% bupivacaine into each of these  3 areas for a total volume of 6 mL of 0.25% bupivacaine. This was followed by cooled thermal lesioning at 80 degrees celsius for 150 seconds at each site.   Needle was withdrawn and a sterile band-aid applied to the skin.      Patient tolerated the procedure well, and was reporting improvement of pain symptoms after the injection.  She was discharged from the clinic in stable condition.

## 2020-01-06 NOTE — DISCHARGE SUMMARY
Discharge Note  Short Stay      SUMMARY     Admit Date: 1/6/2020    Attending Physician: Butch Chao MD        Discharge Physician: Butch Chao MD        Discharge Date: 1/6/2020 9:34 AM    Procedure(s) (LRB):  Left Genicular Nerve RFA (COOLED) with RN IV sedation (Left)    Final Diagnosis: Primary osteoarthritis of left knee [M17.12]  Chronic pain of both knees [M25.561, M25.562, G89.29]    Disposition: Home or self care    Patient Instructions:   Current Discharge Medication List      CONTINUE these medications which have NOT CHANGED    Details   alendronate (FOSAMAX) 70 MG tablet Take 1 tablet (70 mg total) by mouth every 7 days.  Qty: 4 tablet, Refills: 11    Associated Diagnoses: Age-related osteoporosis without current pathological fracture      amLODIPine (NORVASC) 10 MG tablet Take 1 tablet (10 mg total) by mouth once daily.  Qty: 90 tablet, Refills: 1    Associated Diagnoses: Hypertension goal BP (blood pressure) < 130/80      ARIPiprazole (ABILIFY) 10 MG Tab Take 1 tablet (10 mg total) by mouth once daily.  Qty: 30 tablet, Refills: 3    Comments: NEEDS RUSH PA PLEASE      dulaglutide (TRULICITY) 1.5 mg/0.5 mL PnIj Inject 1.5 mg into the skin every 7 days.  Qty: 12 mL, Refills: 1    Associated Diagnoses: Controlled type 2 diabetes mellitus with microalbuminuria, without long-term current use of insulin      hydroCHLOROthiazide (HYDRODIURIL) 25 MG tablet Take 1 tablet (25 mg total) by mouth once daily.  Qty: 30 tablet, Refills: 3    Associated Diagnoses: Hypertension goal BP (blood pressure) < 130/80      losartan (COZAAR) 100 MG tablet Take 1 tablet (100 mg total) by mouth once daily.  Qty: 30 tablet, Refills: 3    Associated Diagnoses: Hypertension goal BP (blood pressure) < 130/80      metFORMIN (GLUCOPHAGE) 1000 MG tablet Take 1 tablet by mouth two times daily with meals.  Qty: 180 tablet, Refills: 1    Associated Diagnoses: Controlled type 2 diabetes mellitus with microalbuminuria, without  "long-term current use of insulin      metoprolol succinate (TOPROL-XL) 25 MG 24 hr tablet Take 1 tablet (25 mg total) by mouth once daily.  Qty: 90 tablet, Refills: 1    Associated Diagnoses: Hypertension goal BP (blood pressure) < 130/80      nystatin (MYCOSTATIN) powder Apply topically 4 (four) times daily.  Qty: 60 g, Refills: 1    Associated Diagnoses: Intertriginous candidiasis      pravastatin (PRAVACHOL) 40 MG tablet Take 1 tablet (40 mg total) by mouth once daily.  Qty: 90 tablet, Refills: 1    Associated Diagnoses: Hyperlipidemia LDL goal <70      blood sugar diagnostic Strp Twice daily glucose testing.  Qty: 60 strip, Refills: 6    Associated Diagnoses: Uncontrolled type 2 diabetes mellitus with microalbuminuria or microproteinuria      blood-glucose meter kit Use as instructed  Qty: 1 each, Refills: 0    Associated Diagnoses: Uncontrolled type 2 diabetes mellitus with microalbuminuria or microproteinuria      cholecalciferol, vitamin D3, (VITAMIN D3) 25 mcg (1,000 unit) capsule Take 1 capsule (1,000 Units total) by mouth once daily.  Qty: 30 capsule, Refills: 11    Associated Diagnoses: Age-related osteoporosis without current pathological fracture      insulin needles, disposable, (PEN NEEDLE) 31 X 5/16 " Ndle Once a day insulin injection.  Qty: 30 each, Refills: 6    Associated Diagnoses: Uncontrolled type 2 diabetes mellitus with microalbuminuria or microproteinuria      lancets Misc Twice daily glucose monitoring.  Qty: 60 each, Refills: 6    Associated Diagnoses: Uncontrolled type 2 diabetes mellitus with microalbuminuria or microproteinuria      meloxicam (MOBIC) 15 MG tablet Take 1 tablet (15 mg total) by mouth once daily. Take with food.  Discontinue if you develop GI side effects.  Qty: 30 tablet, Refills: 1    Associated Diagnoses: Primary osteoarthritis of left knee; Primary osteoarthritis of right knee; Chronic pain of left knee; Chronic pain of right knee      potassium chloride (KLOR-CON) 10 " MEQ TbSR Take 1 tablet (10 mEq total) by mouth once daily.  Qty: 90 tablet, Refills: 1    Associated Diagnoses: Diuretic-induced hypokalemia                 Discharge Diagnosis: Primary osteoarthritis of left knee [M17.12]  Chronic pain of both knees [M25.561, M25.562, G89.29]  Condition on Discharge: Stable with no complications to procedure   Diet on Discharge: Same as before.  Activity: as per instruction sheet.  Discharge to: Home with a responsible adult.  Follow up: 2-4 weeks       Please call the office if you experience any weakness or loss of sensation, fever > 101.5, pain uncontrolled with oral medications, persistent nausea/vomiting/or diarrhea, redness or drainage from the incisions, or any other worrisome concerns. If physician on call was not reached or could not communicate with our office for any reason please go to the nearest emergency department

## 2020-02-04 ENCOUNTER — OFFICE VISIT (OUTPATIENT)
Dept: PAIN MEDICINE | Facility: CLINIC | Age: 65
End: 2020-02-04
Payer: MEDICARE

## 2020-02-04 VITALS
WEIGHT: 234 LBS | HEIGHT: 65 IN | BODY MASS INDEX: 38.99 KG/M2 | DIASTOLIC BLOOD PRESSURE: 72 MMHG | SYSTOLIC BLOOD PRESSURE: 127 MMHG | RESPIRATION RATE: 18 BRPM | HEART RATE: 73 BPM

## 2020-02-04 DIAGNOSIS — M17.11 PRIMARY OSTEOARTHRITIS OF RIGHT KNEE: ICD-10-CM

## 2020-02-04 DIAGNOSIS — M25.562 CHRONIC PAIN OF BOTH KNEES: ICD-10-CM

## 2020-02-04 DIAGNOSIS — M25.561 CHRONIC PAIN OF BOTH KNEES: ICD-10-CM

## 2020-02-04 DIAGNOSIS — G89.29 CHRONIC PAIN OF BOTH KNEES: ICD-10-CM

## 2020-02-04 DIAGNOSIS — M17.12 PRIMARY OSTEOARTHRITIS OF LEFT KNEE: Primary | ICD-10-CM

## 2020-02-04 PROCEDURE — 99214 PR OFFICE/OUTPT VISIT, EST, LEVL IV, 30-39 MIN: ICD-10-PCS | Mod: S$PBB,,, | Performed by: PHYSICIAN ASSISTANT

## 2020-02-04 PROCEDURE — 99999 PR PBB SHADOW E&M-EST. PATIENT-LVL IV: ICD-10-PCS | Mod: PBBFAC,,, | Performed by: PHYSICIAN ASSISTANT

## 2020-02-04 PROCEDURE — 99214 OFFICE O/P EST MOD 30 MIN: CPT | Mod: PBBFAC | Performed by: PHYSICIAN ASSISTANT

## 2020-02-04 PROCEDURE — 99999 PR PBB SHADOW E&M-EST. PATIENT-LVL IV: CPT | Mod: PBBFAC,,, | Performed by: PHYSICIAN ASSISTANT

## 2020-02-04 PROCEDURE — 99214 OFFICE O/P EST MOD 30 MIN: CPT | Mod: S$PBB,,, | Performed by: PHYSICIAN ASSISTANT

## 2020-02-04 NOTE — PROGRESS NOTES
Established Patient Chronic Pain Note     Referring Physician: Iram Valencia PA-C    PCP: Guadalupe Guzman DO      Chief Complaint:   Chief Complaint   Patient presents with    Knee Pain     bilateral        SUBJECTIVE:     Interval History: S/p right genicular RFA on 12/23/19 then left genicular RFA on 1/6/20.The patient reports that she is/was better following the procedure.  she reports 100% pain relief.  The changes lasted >4 weeks so far.  The changes have continued through this visit.    Initial HPI (12/4/2019):  Carisa Curry is a 65 y.o. female who presents to the clinic for the evaluation of bilateral knee pain. The pain started several years ago without any specific injury or trauma and symptoms have been worsening.The pain is located in the bilateral knees.  The pain is described as aching and is rated as 10/10. The pain is rated with a score of  0/10 on the BEST day and a score of 10/10 on the WORST day.  Her pain level is mainly related to her weight-bearing status.  Symptoms interfere with daily activity and sleep. The pain is exacerbated by prolonged weight-bearing, sit to stand, full range of motion, and nighttime.  The pain is mitigated by rest in previous injections.  However, the injections have not provided long-lasting relief.  The patient reports spending 2-4 hours per day reclining. The patient reports 6-8 hours of uninterrupted sleep per night.  She uses a quad cane for ambulation assistance.  Patient denies night fever/night sweats, urinary incontinence, bowel incontinence, significant weight loss, significant motor weakness and loss of sensations.  She does report that the knees occasionally have given out on her and has caused falls in the past.  No recent falls and she reports that her last fall was likely 5/6 months ago.  Of note, patient is accompanied to today's clinic visit with her daughter who provides the majority of the history.      Pain Disability Index Review:  Last 3  PDI Scores 2/4/2020 12/4/2019   Pain Disability Index (PDI) 19 37       Non-Pharmacologic Treatments:  Physical Therapy/Home Exercise: yes  Ice/Heat:yes  TENS: no  Acupuncture: no  Massage: no  Chiropractic: no    Other: yes, assistive ambulation devices      Pain Medications:  - Opioids: Ultram (Tramadol HCL)  - Adjuvant Medications: Lyrica ( Pregabalin) and Mobic (Meloxicam)  - Anti-Coagulants: None     report:  Not applicable    Pain Procedures:   -corticosteroid injections to both knees  -Euflexxa injection series to both knees (given in 07/2019) with ortho  -bilateral genicular nerve blocks on 12/9/19 with 80% pain relief  - right genicular RFA on 12/23/19 with 100% pain relief  - left genicular RFA on 1/6/20 with 100% pain relief      Imaging (reviewed on 2/4/2020):     X-ray bilateral knee 05/27/2019:  Right knee: There is no radiographic evidence of acute osseous, articular, or soft tissue abnormality. There is severe tricompartmental osteoarthritis.  Joint space loss appears most severe in the medial compartment.  Left knee:  There is no radiographic evidence of acute osseous, articular, or soft tissue abnormality. There is severe tricompartmental osteoarthritis.  Joint space loss appears most severe in the medial compartment.      Past Medical History:   Diagnosis Date    Arthritis     CHF (congestive heart failure)     Diabetes mellitus, type 2     Diabetic retinopathy     Hyperlipidemia     Hypertension     Schizophrenia     Seizures     reported per pt and sister, nothing since childhood     Past Surgical History:   Procedure Laterality Date    EYE SURGERY      INJECTION OF ANESTHETIC AGENT AROUND NERVE Bilateral 12/9/2019    Procedure: Bilateral Genicular nerve block (DIAGNOSTIC, NO STEROID) with RN IV sedation;  Surgeon: Butch Chao MD;  Location: [facility];  Service: Pain Management;  Laterality: Bilateral;    RADIOFREQUENCY THERMOCOAGULATION Right 12/23/2019    Procedure:  Right Genicular Nerve RFA (COOLED) with RN IV sedation;  Surgeon: Butch Chao MD;  Location: [facility];  Service: Pain Management;  Laterality: Right;    RADIOFREQUENCY THERMOCOAGULATION Left 1/6/2020    Procedure: Left Genicular Nerve RFA (COOLED) with RN IV sedation;  Surgeon: Butch Chao MD;  Location: [facility];  Service: Pain Management;  Laterality: Left;    SPINE SURGERY       Social History     Socioeconomic History    Marital status: Single     Spouse name: Not on file    Number of children: Not on file    Years of education: Not on file    Highest education level: Not on file   Occupational History    Not on file   Social Needs    Financial resource strain: Not on file    Food insecurity:     Worry: Not on file     Inability: Not on file    Transportation needs:     Medical: Not on file     Non-medical: Not on file   Tobacco Use    Smoking status: Never Smoker    Smokeless tobacco: Never Used   Substance and Sexual Activity    Alcohol use: No     Alcohol/week: 0.0 standard drinks    Drug use: No    Sexual activity: Never   Lifestyle    Physical activity:     Days per week: Not on file     Minutes per session: Not on file    Stress: Not on file   Relationships    Social connections:     Talks on phone: Not on file     Gets together: Not on file     Attends Jew service: Not on file     Active member of club or organization: Not on file     Attends meetings of clubs or organizations: Not on file     Relationship status: Not on file   Other Topics Concern    Not on file   Social History Narrative    Not on file     Family History   Problem Relation Age of Onset    Kidney disease Mother     Heart failure Mother     Cataracts Mother     Hypertension Mother     Heart disease Mother     Stroke Mother     Cancer Father         brain    Diabetes Father     Diabetes Maternal Aunt     Hypertension Maternal Aunt     Diabetes Paternal Aunt     Hypertension  "Paternal Aunt     Heart disease Paternal Aunt     Diabetes Paternal Uncle     Heart disease Paternal Uncle        Review of patient's allergies indicates:  No Known Allergies    Current Outpatient Medications   Medication Sig    alendronate (FOSAMAX) 70 MG tablet Take 1 tablet (70 mg total) by mouth every 7 days.    amLODIPine (NORVASC) 10 MG tablet Take 1 tablet (10 mg total) by mouth once daily.    ARIPiprazole (ABILIFY) 10 MG Tab Take 1 tablet (10 mg total) by mouth once daily.    blood sugar diagnostic Strp Twice daily glucose testing.    cholecalciferol, vitamin D3, (VITAMIN D3) 25 mcg (1,000 unit) capsule Take 1 capsule (1,000 Units total) by mouth once daily.    dulaglutide (TRULICITY) 1.5 mg/0.5 mL PnIj Inject 1.5 mg into the skin every 7 days.    hydroCHLOROthiazide (HYDRODIURIL) 25 MG tablet Take 1 tablet (25 mg total) by mouth once daily.    insulin needles, disposable, (PEN NEEDLE) 31 X 5/16 " Ndle Once a day insulin injection.    lancets Misc Twice daily glucose monitoring.    losartan (COZAAR) 100 MG tablet Take 1 tablet (100 mg total) by mouth once daily.    meloxicam (MOBIC) 15 MG tablet Take 1 tablet (15 mg total) by mouth once daily. Take with food.  Discontinue if you develop GI side effects.    metFORMIN (GLUCOPHAGE) 1000 MG tablet Take 1 tablet by mouth two times daily with meals.    metoprolol succinate (TOPROL-XL) 25 MG 24 hr tablet Take 1 tablet (25 mg total) by mouth once daily.    nystatin (MYCOSTATIN) powder Apply topically 4 (four) times daily.    potassium chloride (KLOR-CON) 10 MEQ TbSR Take 1 tablet (10 mEq total) by mouth once daily.    pravastatin (PRAVACHOL) 40 MG tablet Take 1 tablet (40 mg total) by mouth once daily.    blood-glucose meter kit Use as instructed     No current facility-administered medications for this visit.          Review of Systems:   GENERAL:  No weight loss, malaise or fevers.  HEENT:   No recent changes in vision or hearing  NECK:  " "Negative for lumps, no difficulty with swallowing.  RESPIRATORY:  Negative for cough, wheezing or shortness of breath, patient denies any recent URI.  CARDIOVASCULAR:  Negative for chest pain, leg swelling or palpitations.  GI:  Negative for abdominal discomfort, blood in stools or black stools or change in bowel habits.  MUSCULOSKELETAL:  See HPI.  SKIN:  Negative for lesions, rash, and itching.  PSYCH:  No mood disorder or recent psychosocial stressors.  Patients sleep is not disturbed secondary to pain.  HEMATOLOGY/LYMPHOLOGY:  Negative for prolonged bleeding, bruising easily or swollen nodes.  Patient is not currently taking any anti-coagulants  NEURO:   No history of headaches, syncope, paralysis, seizures or tremors.  ENDO: +DM  All other reviewed and negative other than HPI.        OBJECTIVE:    Physical Exam:   Vitals:  /72 (BP Location: Right arm, Patient Position: Sitting, BP Method: Medium (Automatic))   Pulse 73   Resp 18   Ht 5' 5" (1.651 m)   Wt 106.1 kg (234 lb)   BMI 38.94 kg/m²   (reviewed on 2/4/2020)    General: alert and oriented, in no apparent distress.  Gait: Antalgic, walks with quad cane for assistance.  Skin: no rashes, no discoloration, no obvious lesions  HEENT: normocephalic, atraumatic. Pupils equal and round.  Cardiovascular: no significant peripheral edema present.  Respiratory: without use of accessory muscles of respiration.    Musculoskeletal - Lumbar Spine:  - Pain on flexion of lumbar spine: Absent   - Pain on extension of lumbar spine: Absent         - Lumbar facet loading: Absent   - TTP over the lumbar facet joints: Absent  - TTP over the SI joints:  Absent     Knees:  - ROM: decreased flexion secondary to pain  - TTP: Present over medial/ lateral joint line  - Pain with extension: Present, improved  - Pain with flexion: Present, improved  - Crepitus: Present bilaterally    Neuro - Lower Extremities:  - RLE Strength:     >> 5/5 strength with right hip flexion/ " extension    >> 5/5 strength with right knee flexion/ extension    >> 5/5 strength in right ankle with plantar and dorsiflexion  - LLE Strength:     >> 5/5 strength with left hip flexion/ extension    >> 5/5 strength with knee flexion extension on the left     >> 5/5 strength in left ankle with plantar and dorsiflexion  - BLE Strength: R/L: HF: 5/5, HE: 5/5, KF: 5/5; KE: 5/5; FE: 5/5; FF: 5/5  - Extremity Reflexes: Brisk and symmetric throughout  - Sensory: Sensation to light touch intact bilaterally      Psych:  Mood and affect is appropriate      Tenderness to palpation over the medial and lateral joint lines of both knees.       LABS:  Lab Results   Component Value Date    WBC 7.06 07/01/2015    HGB 13.4 07/01/2015    HCT 40.3 07/01/2015    MCV 83 07/01/2015     07/01/2015       CMP  Sodium   Date Value Ref Range Status   11/27/2019 140 136 - 145 mmol/L Final     Potassium   Date Value Ref Range Status   11/27/2019 3.6 3.5 - 5.1 mmol/L Final     Chloride   Date Value Ref Range Status   11/27/2019 102 95 - 110 mmol/L Final     CO2   Date Value Ref Range Status   11/27/2019 26 23 - 29 mmol/L Final     Glucose   Date Value Ref Range Status   11/27/2019 124 (H) 70 - 110 mg/dL Final     BUN, Bld   Date Value Ref Range Status   11/27/2019 18 8 - 23 mg/dL Final     Creatinine   Date Value Ref Range Status   11/27/2019 1.0 0.5 - 1.4 mg/dL Final     Calcium   Date Value Ref Range Status   11/27/2019 9.5 8.7 - 10.5 mg/dL Final     Total Protein   Date Value Ref Range Status   11/27/2019 8.2 6.0 - 8.4 g/dL Final     Albumin   Date Value Ref Range Status   11/27/2019 3.9 3.5 - 5.2 g/dL Final     Total Bilirubin   Date Value Ref Range Status   11/27/2019 0.5 0.1 - 1.0 mg/dL Final     Comment:     For infants and newborns, interpretation of results should be based  on gestational age, weight and in agreement with clinical  observations.  Premature Infant recommended reference ranges:  Up to 24 hours.............<8.0  mg/dL  Up to 48 hours............<12.0 mg/dL  3-5 days..................<15.0 mg/dL  6-29 days.................<15.0 mg/dL       Alkaline Phosphatase   Date Value Ref Range Status   11/27/2019 97 55 - 135 U/L Final     AST   Date Value Ref Range Status   11/27/2019 12 10 - 40 U/L Final     ALT   Date Value Ref Range Status   11/27/2019 8 (L) 10 - 44 U/L Final     Anion Gap   Date Value Ref Range Status   11/27/2019 12 8 - 16 mmol/L Final     eGFR if    Date Value Ref Range Status   11/27/2019 >60.0 >60 mL/min/1.73 m^2 Final     eGFR if non    Date Value Ref Range Status   11/27/2019 59.3 (A) >60 mL/min/1.73 m^2 Final     Comment:     Calculation used to obtain the estimated glomerular filtration  rate (eGFR) is the CKD-EPI equation.          Lab Results   Component Value Date    HGBA1C 6.3 (H) 11/27/2019             ASSESSMENT: 65 y.o. year old female with bilateral knee pain, consistent with     1. Primary osteoarthritis of left knee     2. Primary osteoarthritis of right knee     3. Chronic pain of both knees           PLAN:     1. Interventional:   - S/p right genicular RFA on 12/23/19 with 100% pain relief.  - S/p left genicular RFA on 1/6/20 with 100% pain relief.    2. Pharmacologic: Continue Mobic 15mg QD PRN.     3. Rehabilitative: Encouraged regular exercise.    4. Diagnostic: None for now.    5. Follow up: PRN     - I discussed the risks, benefits, and alternatives to potential treatment options. All questions and concerns were fully addressed today in clinic. Dr. Chao was consulted regarding the patient plan and agrees.

## 2020-03-10 DIAGNOSIS — I10 HYPERTENSION GOAL BP (BLOOD PRESSURE) < 130/80: Chronic | ICD-10-CM

## 2020-03-10 DIAGNOSIS — E78.5 HYPERLIPIDEMIA LDL GOAL <70: ICD-10-CM

## 2020-03-11 RX ORDER — PRAVASTATIN SODIUM 40 MG/1
TABLET ORAL
Qty: 90 TABLET | Refills: 0 | Status: SHIPPED | OUTPATIENT
Start: 2020-03-11 | End: 2020-07-29

## 2020-03-11 RX ORDER — METOPROLOL SUCCINATE 25 MG/1
TABLET, EXTENDED RELEASE ORAL
Qty: 90 TABLET | Refills: 0 | Status: SHIPPED | OUTPATIENT
Start: 2020-03-11 | End: 2020-07-29

## 2020-03-11 RX ORDER — LOSARTAN POTASSIUM 100 MG/1
TABLET ORAL
Qty: 90 TABLET | Refills: 0 | Status: SHIPPED | OUTPATIENT
Start: 2020-03-11 | End: 2020-07-29

## 2020-04-09 DIAGNOSIS — I10 HYPERTENSION GOAL BP (BLOOD PRESSURE) < 130/80: Chronic | ICD-10-CM

## 2020-04-13 RX ORDER — HYDROCHLOROTHIAZIDE 25 MG/1
TABLET ORAL
Qty: 30 TABLET | Refills: 3 | Status: SHIPPED | OUTPATIENT
Start: 2020-04-13 | End: 2020-07-29

## 2020-04-21 RX ORDER — ARIPIPRAZOLE 10 MG/1
10 TABLET ORAL DAILY
Qty: 30 TABLET | Refills: 0 | Status: SHIPPED | OUTPATIENT
Start: 2020-04-21 | End: 2020-05-06 | Stop reason: SDUPTHER

## 2020-05-06 ENCOUNTER — OFFICE VISIT (OUTPATIENT)
Dept: PSYCHIATRY | Facility: CLINIC | Age: 65
End: 2020-05-06
Payer: MEDICARE

## 2020-05-06 DIAGNOSIS — F20.9 SCHIZOPHRENIA, UNSPECIFIED TYPE: Primary | ICD-10-CM

## 2020-05-06 PROCEDURE — 99211 OFF/OP EST MAY X REQ PHY/QHP: CPT | Mod: PBBFAC | Performed by: PSYCHIATRY & NEUROLOGY

## 2020-05-06 PROCEDURE — 99999 PR PBB SHADOW E&M-EST. PATIENT-LVL I: CPT | Mod: PBBFAC,,, | Performed by: PSYCHIATRY & NEUROLOGY

## 2020-05-06 PROCEDURE — 99213 PR OFFICE/OUTPT VISIT, EST, LEVL III, 20-29 MIN: ICD-10-PCS | Mod: S$PBB,,, | Performed by: PSYCHIATRY & NEUROLOGY

## 2020-05-06 PROCEDURE — 99999 PR PBB SHADOW E&M-EST. PATIENT-LVL I: ICD-10-PCS | Mod: PBBFAC,,, | Performed by: PSYCHIATRY & NEUROLOGY

## 2020-05-06 PROCEDURE — 99213 OFFICE O/P EST LOW 20 MIN: CPT | Mod: S$PBB,,, | Performed by: PSYCHIATRY & NEUROLOGY

## 2020-05-06 RX ORDER — ARIPIPRAZOLE 10 MG/1
10 TABLET ORAL DAILY
Qty: 30 TABLET | Refills: 5 | Status: SHIPPED | OUTPATIENT
Start: 2020-05-06 | End: 2020-12-07
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
## 2020-05-26 RX ORDER — INSULIN PUMP SYRINGE, 3 ML
EACH MISCELLANEOUS
Qty: 1 EACH | Refills: 0 | Status: SHIPPED | OUTPATIENT
Start: 2020-05-26 | End: 2023-06-29

## 2020-05-26 RX ORDER — LANCETS
EACH MISCELLANEOUS
Qty: 100 EACH | Refills: 0 | Status: SHIPPED | OUTPATIENT
Start: 2020-05-26

## 2020-05-26 NOTE — TELEPHONE ENCOUNTER
Spoke with patient and informed her that her refill were sent to the pharmacy. She thanked me for calling.

## 2020-05-27 RX ORDER — POTASSIUM CHLORIDE 750 MG/1
TABLET, EXTENDED RELEASE ORAL
Qty: 90 TABLET | Refills: 0 | Status: SHIPPED | OUTPATIENT
Start: 2020-05-27 | End: 2020-08-27

## 2020-06-05 ENCOUNTER — PATIENT OUTREACH (OUTPATIENT)
Dept: ADMINISTRATIVE | Facility: OTHER | Age: 65
End: 2020-06-05

## 2020-06-05 NOTE — PROGRESS NOTES
Chart reviewed.   Immunizations: Triggered Imm Registry     Orders placed: n/a  Upcoming appts to satisfy MARIO topics: eye exam

## 2020-06-08 ENCOUNTER — LAB VISIT (OUTPATIENT)
Dept: LAB | Facility: HOSPITAL | Age: 65
End: 2020-06-08
Attending: INTERNAL MEDICINE
Payer: MEDICARE

## 2020-06-08 ENCOUNTER — OFFICE VISIT (OUTPATIENT)
Dept: OPHTHALMOLOGY | Facility: CLINIC | Age: 65
End: 2020-06-08
Payer: MEDICARE

## 2020-06-08 DIAGNOSIS — H52.03 HYPEROPIA, BILATERAL: ICD-10-CM

## 2020-06-08 DIAGNOSIS — I10 HYPERTENSION GOAL BP (BLOOD PRESSURE) < 130/80: ICD-10-CM

## 2020-06-08 DIAGNOSIS — M81.0 AGE-RELATED OSTEOPOROSIS WITHOUT CURRENT PATHOLOGICAL FRACTURE: ICD-10-CM

## 2020-06-08 DIAGNOSIS — H25.013 CORTICAL AGE-RELATED CATARACT, BILATERAL: ICD-10-CM

## 2020-06-08 DIAGNOSIS — E11.36 DIABETIC CATARACT: ICD-10-CM

## 2020-06-08 DIAGNOSIS — H52.4 BILATERAL PRESBYOPIA: ICD-10-CM

## 2020-06-08 DIAGNOSIS — E78.5 HYPERLIPIDEMIA LDL GOAL <70: ICD-10-CM

## 2020-06-08 DIAGNOSIS — E11.9 DIABETES MELLITUS TYPE 2 WITHOUT RETINOPATHY: Primary | ICD-10-CM

## 2020-06-08 LAB
25(OH)D3+25(OH)D2 SERPL-MCNC: 42 NG/ML (ref 30–96)
ALBUMIN SERPL BCP-MCNC: 3.9 G/DL (ref 3.5–5.2)
ALP SERPL-CCNC: 81 U/L (ref 55–135)
ALT SERPL W/O P-5'-P-CCNC: 6 U/L (ref 10–44)
ANION GAP SERPL CALC-SCNC: 10 MMOL/L (ref 8–16)
AST SERPL-CCNC: 10 U/L (ref 10–40)
BILIRUB SERPL-MCNC: 0.4 MG/DL (ref 0.1–1)
BUN SERPL-MCNC: 25 MG/DL (ref 8–23)
CALCIUM SERPL-MCNC: 9.8 MG/DL (ref 8.7–10.5)
CHLORIDE SERPL-SCNC: 101 MMOL/L (ref 95–110)
CHOLEST SERPL-MCNC: 136 MG/DL (ref 120–199)
CHOLEST/HDLC SERPL: 2.3 {RATIO} (ref 2–5)
CO2 SERPL-SCNC: 29 MMOL/L (ref 23–29)
CREAT SERPL-MCNC: 1.1 MG/DL (ref 0.5–1.4)
EST. GFR  (AFRICAN AMERICAN): >60 ML/MIN/1.73 M^2
EST. GFR  (NON AFRICAN AMERICAN): 52.4 ML/MIN/1.73 M^2
ESTIMATED AVG GLUCOSE: 105 MG/DL (ref 68–131)
GLUCOSE SERPL-MCNC: 99 MG/DL (ref 70–110)
HBA1C MFR BLD HPLC: 5.3 % (ref 4–5.6)
HDLC SERPL-MCNC: 58 MG/DL (ref 40–75)
HDLC SERPL: 42.6 % (ref 20–50)
LDLC SERPL CALC-MCNC: 64.8 MG/DL (ref 63–159)
LEFT EYE DM RETINOPATHY: NEGATIVE
NONHDLC SERPL-MCNC: 78 MG/DL
POTASSIUM SERPL-SCNC: 3.5 MMOL/L (ref 3.5–5.1)
PROT SERPL-MCNC: 8.3 G/DL (ref 6–8.4)
RIGHT EYE DM RETINOPATHY: NEGATIVE
SODIUM SERPL-SCNC: 140 MMOL/L (ref 136–145)
TRIGL SERPL-MCNC: 66 MG/DL (ref 30–150)

## 2020-06-08 PROCEDURE — 92014 PR EYE EXAM, EST PATIENT,COMPREHESV: ICD-10-PCS | Mod: S$PBB,,, | Performed by: OPTOMETRIST

## 2020-06-08 PROCEDURE — 36415 COLL VENOUS BLD VENIPUNCTURE: CPT

## 2020-06-08 PROCEDURE — 92015 PR REFRACTION: ICD-10-PCS | Mod: ,,, | Performed by: OPTOMETRIST

## 2020-06-08 PROCEDURE — 99999 PR PBB SHADOW E&M-EST. PATIENT-LVL I: ICD-10-PCS | Mod: PBBFAC,,, | Performed by: OPTOMETRIST

## 2020-06-08 PROCEDURE — 80061 LIPID PANEL: CPT

## 2020-06-08 PROCEDURE — 99211 OFF/OP EST MAY X REQ PHY/QHP: CPT | Mod: PBBFAC | Performed by: OPTOMETRIST

## 2020-06-08 PROCEDURE — 92015 DETERMINE REFRACTIVE STATE: CPT | Mod: ,,, | Performed by: OPTOMETRIST

## 2020-06-08 PROCEDURE — 82306 VITAMIN D 25 HYDROXY: CPT

## 2020-06-08 PROCEDURE — 83036 HEMOGLOBIN GLYCOSYLATED A1C: CPT

## 2020-06-08 PROCEDURE — 99999 PR PBB SHADOW E&M-EST. PATIENT-LVL I: CPT | Mod: PBBFAC,,, | Performed by: OPTOMETRIST

## 2020-06-08 PROCEDURE — 80053 COMPREHEN METABOLIC PANEL: CPT

## 2020-06-08 PROCEDURE — 92014 COMPRE OPH EXAM EST PT 1/>: CPT | Mod: S$PBB,,, | Performed by: OPTOMETRIST

## 2020-06-08 NOTE — PROGRESS NOTES
HPI     NIDDM exam.   No visual complaints.   Last eye exam 06/03/2019 TRF.   Lab Results       Component                Value               Date                       HGBA1C                   6.3 (H)             11/27/2019        Last edited by Jonas Major, OD on 6/8/2020  9:04 AM. (History)            Assessment /Plan     For exam results, see Encounter Report.    Diabetes mellitus type 2 without retinopathy    Cortical age-related cataract, bilateral    Hyperopia, bilateral    Bilateral presbyopia    Diabetic cataract      No Background Diabetic Retinopathy    Moderate cataracts OU, not surgical    Dispense Final Rx for glasses or may use OTC glasses.  RTC 1 year  Discussed above and answered questions.

## 2020-06-11 ENCOUNTER — PES CALL (OUTPATIENT)
Dept: ADMINISTRATIVE | Facility: CLINIC | Age: 65
End: 2020-06-11

## 2020-06-15 ENCOUNTER — OFFICE VISIT (OUTPATIENT)
Dept: INTERNAL MEDICINE | Facility: CLINIC | Age: 65
End: 2020-06-15
Payer: MEDICARE

## 2020-06-15 VITALS
DIASTOLIC BLOOD PRESSURE: 66 MMHG | HEIGHT: 64 IN | TEMPERATURE: 96 F | WEIGHT: 209 LBS | BODY MASS INDEX: 35.68 KG/M2 | SYSTOLIC BLOOD PRESSURE: 132 MMHG

## 2020-06-15 DIAGNOSIS — E66.01 SEVERE OBESITY WITH BODY MASS INDEX (BMI) OF 35.0 TO 39.9 WITH COMORBIDITY: ICD-10-CM

## 2020-06-15 DIAGNOSIS — R80.9 TYPE 2 DIABETES MELLITUS WITH MICROALBUMINURIA, WITHOUT LONG-TERM CURRENT USE OF INSULIN: Primary | ICD-10-CM

## 2020-06-15 DIAGNOSIS — E11.29 TYPE 2 DIABETES MELLITUS WITH MICROALBUMINURIA, WITHOUT LONG-TERM CURRENT USE OF INSULIN: Primary | ICD-10-CM

## 2020-06-15 DIAGNOSIS — I10 HYPERTENSION GOAL BP (BLOOD PRESSURE) < 130/80: ICD-10-CM

## 2020-06-15 DIAGNOSIS — E78.5 HYPERLIPIDEMIA LDL GOAL <70: ICD-10-CM

## 2020-06-15 DIAGNOSIS — F20.9 SCHIZOPHRENIA, UNSPECIFIED TYPE: ICD-10-CM

## 2020-06-15 PROCEDURE — 99999 PR PBB SHADOW E&M-EST. PATIENT-LVL III: ICD-10-PCS | Mod: PBBFAC,,, | Performed by: INTERNAL MEDICINE

## 2020-06-15 PROCEDURE — 99214 OFFICE O/P EST MOD 30 MIN: CPT | Mod: S$PBB,,, | Performed by: INTERNAL MEDICINE

## 2020-06-15 PROCEDURE — 99214 PR OFFICE/OUTPT VISIT, EST, LEVL IV, 30-39 MIN: ICD-10-PCS | Mod: S$PBB,,, | Performed by: INTERNAL MEDICINE

## 2020-06-15 PROCEDURE — 99999 PR PBB SHADOW E&M-EST. PATIENT-LVL III: CPT | Mod: PBBFAC,,, | Performed by: INTERNAL MEDICINE

## 2020-06-15 PROCEDURE — 99213 OFFICE O/P EST LOW 20 MIN: CPT | Mod: PBBFAC | Performed by: INTERNAL MEDICINE

## 2020-06-15 NOTE — PROGRESS NOTES
Subjective:       Patient ID: Carisa Curry is a 65 y.o. female.    Chief Complaint: Follow-up    Carisa Curry  65 y.o. Black or  female    Patient presents with:  Follow-up    HPI: Here today to follow up on chronic conditions. She is here with her care provider.   Diabetes--continues to improve. She is compliant with her diet and medications. She checks her glucoses once daily and gets readings in the 110's-130's. She denies episodes of hypoglycemia.                   HGBA1C                   5.3                 06/08/2020            Microalbuminuria--stable on losartan. She denies taking NSAID's.   HTN--stable on HCTZ, amlodipine and losartan.   HLD--compliant with pravastatin.                   CHOL                     136                 06/08/2020                 HDL                      58                  06/08/2020                 LDLCALC                  64.8                06/08/2020                 TRIG                     66                  06/08/2020            Schizophrenia--management per psychiatry.        Past Medical History:  Arthritis  CHF (congestive heart failure)  Diabetes mellitus, type 2  Diabetic retinopathy  Hyperlipidemia  Hypertension  Schizophrenia  Seizures      Comment:  reported per pt and sister, nothing since childhood    Current Outpatient Medications on File Prior to Visit:  alendronate (FOSAMAX) 70 MG tablet, Take 1 tablet (70 mg total) by mouth every 7 days., Disp: 4 tablet, Rfl: 11  amLODIPine (NORVASC) 10 MG tablet, Take 1 tablet (10 mg total) by mouth once daily., Disp: 90 tablet, Rfl: 1  ARIPiprazole (ABILIFY) 10 MG Tab, Take 1 tablet (10 mg total) by mouth once daily., Disp: 30 tablet, Rfl: 5  blood sugar diagnostic Strp, To check BG 2 times daily, to use with insurance preferred meter, Disp: 100 strip, Rfl: 0  blood-glucose meter kit, To check BG 2 times daily, to use with insurance preferred meter, Disp: 1 each, Rfl: 0  cholecalciferol,  "vitamin D3, (VITAMIN D3) 25 mcg (1,000 unit) capsule, Take 1 capsule (1,000 Units total) by mouth once daily., Disp: 30 capsule, Rfl: 11  dulaglutide (TRULICITY) 1.5 mg/0.5 mL PnIj, Inject 1.5 mg into the skin every 7 days., Disp: 12 mL, Rfl: 1  hydroCHLOROthiazide (HYDRODIURIL) 25 MG tablet, TAKE ONE TABLET BY MOUTH ONE TIME DAILY , Disp: 30 tablet, Rfl: 3  insulin needles, disposable, (PEN NEEDLE) 31 X 5/16 " Ndle, Once a day insulin injection., Disp: 30 each, Rfl: 6  lancets Misc, To check BG 2 times daily, to use with insurance preferred meter, Disp: 100 each, Rfl: 0  losartan (COZAAR) 100 MG tablet, TAKE ONE TABLET BY MOUTH ONE TIME DAILY , Disp: 90 tablet, Rfl: 0  metFORMIN (GLUCOPHAGE) 1000 MG tablet, Take 1 tablet by mouth two times daily with meals., Disp: 180 tablet, Rfl: 1  metoprolol succinate (TOPROL-XL) 25 MG 24 hr tablet, Take 1 tablet by mouth daily., Disp: 90 tablet, Rfl: 0  nystatin (MYCOSTATIN) powder, Apply topically 4 (four) times daily., Disp: 60 g, Rfl: 1  potassium chloride SA (K-DUR,KLOR-CON) 10 MEQ tablet, TAKE ONE TABLET BY MOUTH ONE TIME DAILY , Disp: 90 tablet, Rfl: 0  pravastatin (PRAVACHOL) 40 MG tablet, Take 1 tablet by mouth daily., Disp: 90 tablet, Rfl: 0    Allergies:  Review of patient's allergies indicates:  No Known Allergies      Review of Systems   Constitutional: Negative for fever and unexpected weight change.   Respiratory: Negative for cough and shortness of breath.    Cardiovascular: Negative for chest pain.   Neurological: Negative for dizziness and headaches.   Psychiatric/Behavioral: Negative for behavioral problems.       Objective:      Physical Exam  Vitals signs reviewed.   Constitutional:       General: She is not in acute distress.     Appearance: She is well-developed.   Eyes:      General: No scleral icterus.  Cardiovascular:      Rate and Rhythm: Normal rate and regular rhythm.      Heart sounds: Normal heart sounds.   Pulmonary:      Effort: Pulmonary effort " is normal. No respiratory distress.      Breath sounds: Normal breath sounds.   Skin:     General: Skin is warm and dry.   Neurological:      Mental Status: She is alert.         Assessment:       1. Type 2 diabetes mellitus with microalbuminuria, without long-term current use of insulin    2. Hypertension goal BP (blood pressure) < 130/80    3. Hyperlipidemia LDL goal <70    4. Schizophrenia, unspecified type    5. Severe obesity with body mass index (BMI) of 35.0 to 39.9 with comorbidity        Plan:       Carisa was seen today for follow-up.    Diagnoses and all orders for this visit:    Type 2 diabetes mellitus with microalbuminuria, without long-term current use of insulin  -     Continue current management    Hypertension goal BP (blood pressure) < 130/80  -     Continue current management    Hyperlipidemia LDL goal <70  -     Continue current management    Schizophrenia, unspecified type  -     F/U with psychiatry    Severe obesity with body mass index (BMI) of 35.0 to 39.9 with comorbidity  -     Lifestyle modifications discussed    Labs and f/u in 6 months and as needed.

## 2020-07-07 ENCOUNTER — TELEPHONE (OUTPATIENT)
Dept: INTERNAL MEDICINE | Facility: CLINIC | Age: 65
End: 2020-07-07

## 2020-07-07 NOTE — TELEPHONE ENCOUNTER
----- Message from Hetal Borjas sent at 7/7/2020  9:46 AM CDT -----  Regarding: [facility]  The caller is requesting a copy of the patient's face to face documentation from the past 6 months sent to them for the patient to receive their meter. Please call [facility] back at [phone] Fax:[phone]

## 2020-07-28 ENCOUNTER — TELEPHONE (OUTPATIENT)
Dept: INTERNAL MEDICINE | Facility: CLINIC | Age: 65
End: 2020-07-28

## 2020-11-18 ENCOUNTER — PES CALL (OUTPATIENT)
Dept: ADMINISTRATIVE | Facility: CLINIC | Age: 65
End: 2020-11-18

## 2020-12-09 ENCOUNTER — LAB VISIT (OUTPATIENT)
Dept: LAB | Facility: HOSPITAL | Age: 65
End: 2020-12-09
Attending: INTERNAL MEDICINE
Payer: MEDICARE

## 2020-12-09 DIAGNOSIS — E78.5 HYPERLIPIDEMIA LDL GOAL <70: ICD-10-CM

## 2020-12-09 DIAGNOSIS — I10 HYPERTENSION GOAL BP (BLOOD PRESSURE) < 130/80: ICD-10-CM

## 2020-12-09 LAB
ALBUMIN SERPL BCP-MCNC: 3.5 G/DL (ref 3.5–5.2)
ALP SERPL-CCNC: 65 U/L (ref 55–135)
ALT SERPL W/O P-5'-P-CCNC: 8 U/L (ref 10–44)
ANION GAP SERPL CALC-SCNC: 11 MMOL/L (ref 8–16)
AST SERPL-CCNC: 10 U/L (ref 10–40)
BILIRUB SERPL-MCNC: 0.3 MG/DL (ref 0.1–1)
BUN SERPL-MCNC: 20 MG/DL (ref 8–23)
CALCIUM SERPL-MCNC: 9.1 MG/DL (ref 8.7–10.5)
CHLORIDE SERPL-SCNC: 103 MMOL/L (ref 95–110)
CHOLEST SERPL-MCNC: 177 MG/DL (ref 120–199)
CHOLEST/HDLC SERPL: 2.9 {RATIO} (ref 2–5)
CO2 SERPL-SCNC: 27 MMOL/L (ref 23–29)
CREAT SERPL-MCNC: 1.2 MG/DL (ref 0.5–1.4)
EST. GFR  (AFRICAN AMERICAN): 54.4 ML/MIN/1.73 M^2
EST. GFR  (NON AFRICAN AMERICAN): 47.2 ML/MIN/1.73 M^2
ESTIMATED AVG GLUCOSE: 91 MG/DL (ref 68–131)
GLUCOSE SERPL-MCNC: 115 MG/DL (ref 70–110)
HBA1C MFR BLD HPLC: 4.8 % (ref 4–5.6)
HDLC SERPL-MCNC: 61 MG/DL (ref 40–75)
HDLC SERPL: 34.5 % (ref 20–50)
LDLC SERPL CALC-MCNC: 101 MG/DL (ref 63–159)
NONHDLC SERPL-MCNC: 116 MG/DL
POTASSIUM SERPL-SCNC: 4.1 MMOL/L (ref 3.5–5.1)
PROT SERPL-MCNC: 7.5 G/DL (ref 6–8.4)
SODIUM SERPL-SCNC: 141 MMOL/L (ref 136–145)
TRIGL SERPL-MCNC: 75 MG/DL (ref 30–150)

## 2020-12-09 PROCEDURE — 80053 COMPREHEN METABOLIC PANEL: CPT

## 2020-12-09 PROCEDURE — 80061 LIPID PANEL: CPT

## 2020-12-09 PROCEDURE — 83036 HEMOGLOBIN GLYCOSYLATED A1C: CPT

## 2020-12-09 PROCEDURE — 36415 COLL VENOUS BLD VENIPUNCTURE: CPT

## 2020-12-11 ENCOUNTER — PES CALL (OUTPATIENT)
Dept: ADMINISTRATIVE | Facility: CLINIC | Age: 65
End: 2020-12-11

## 2020-12-15 ENCOUNTER — OFFICE VISIT (OUTPATIENT)
Dept: INTERNAL MEDICINE | Facility: CLINIC | Age: 65
End: 2020-12-15
Payer: MEDICARE

## 2020-12-15 ENCOUNTER — TELEPHONE (OUTPATIENT)
Dept: INTERNAL MEDICINE | Facility: CLINIC | Age: 65
End: 2020-12-15

## 2020-12-15 VITALS
BODY MASS INDEX: 31.69 KG/M2 | DIASTOLIC BLOOD PRESSURE: 72 MMHG | HEIGHT: 64 IN | WEIGHT: 185.63 LBS | SYSTOLIC BLOOD PRESSURE: 130 MMHG | TEMPERATURE: 96 F

## 2020-12-15 DIAGNOSIS — E11.29 CONTROLLED TYPE 2 DIABETES MELLITUS WITH MICROALBUMINURIA, WITHOUT LONG-TERM CURRENT USE OF INSULIN: Primary | Chronic | ICD-10-CM

## 2020-12-15 DIAGNOSIS — M81.0 AGE-RELATED OSTEOPOROSIS WITHOUT CURRENT PATHOLOGICAL FRACTURE: ICD-10-CM

## 2020-12-15 DIAGNOSIS — I10 HYPERTENSION GOAL BP (BLOOD PRESSURE) < 130/80: Chronic | ICD-10-CM

## 2020-12-15 DIAGNOSIS — N28.9 RENAL INSUFFICIENCY: ICD-10-CM

## 2020-12-15 DIAGNOSIS — E87.6 DIURETIC-INDUCED HYPOKALEMIA: ICD-10-CM

## 2020-12-15 DIAGNOSIS — T50.2X5A DIURETIC-INDUCED HYPOKALEMIA: ICD-10-CM

## 2020-12-15 DIAGNOSIS — R80.9 CONTROLLED TYPE 2 DIABETES MELLITUS WITH MICROALBUMINURIA, WITHOUT LONG-TERM CURRENT USE OF INSULIN: Primary | Chronic | ICD-10-CM

## 2020-12-15 DIAGNOSIS — E78.5 HYPERLIPIDEMIA LDL GOAL <70: ICD-10-CM

## 2020-12-15 DIAGNOSIS — Z12.11 COLON CANCER SCREENING: ICD-10-CM

## 2020-12-15 DIAGNOSIS — Z23 IMMUNIZATION DUE: ICD-10-CM

## 2020-12-15 LAB — GLUCOSE SERPL-MCNC: 80 MG/DL (ref 70–110)

## 2020-12-15 PROCEDURE — 99999 PR PBB SHADOW E&M-EST. PATIENT-LVL IV: CPT | Mod: PBBFAC,,, | Performed by: INTERNAL MEDICINE

## 2020-12-15 PROCEDURE — 90694 VACC AIIV4 NO PRSRV 0.5ML IM: CPT | Mod: PBBFAC

## 2020-12-15 PROCEDURE — 99214 OFFICE O/P EST MOD 30 MIN: CPT | Mod: S$PBB,,, | Performed by: INTERNAL MEDICINE

## 2020-12-15 PROCEDURE — 99999 PR PBB SHADOW E&M-EST. PATIENT-LVL IV: ICD-10-PCS | Mod: PBBFAC,,, | Performed by: INTERNAL MEDICINE

## 2020-12-15 PROCEDURE — G0008 ADMIN INFLUENZA VIRUS VAC: HCPCS | Mod: PBBFAC

## 2020-12-15 PROCEDURE — 99214 OFFICE O/P EST MOD 30 MIN: CPT | Mod: PBBFAC | Performed by: INTERNAL MEDICINE

## 2020-12-15 PROCEDURE — 82962 GLUCOSE BLOOD TEST: CPT | Mod: PBBFAC | Performed by: INTERNAL MEDICINE

## 2020-12-15 PROCEDURE — 99214 PR OFFICE/OUTPT VISIT, EST, LEVL IV, 30-39 MIN: ICD-10-PCS | Mod: S$PBB,,, | Performed by: INTERNAL MEDICINE

## 2020-12-15 RX ORDER — HYDROCHLOROTHIAZIDE 25 MG/1
25 TABLET ORAL DAILY
Qty: 90 TABLET | Refills: 1 | Status: SHIPPED | OUTPATIENT
Start: 2020-12-15 | End: 2021-04-21 | Stop reason: SDUPTHER

## 2020-12-15 RX ORDER — POTASSIUM CHLORIDE 750 MG/1
10 TABLET, EXTENDED RELEASE ORAL DAILY
Qty: 90 TABLET | Refills: 1 | Status: SHIPPED | OUTPATIENT
Start: 2020-12-15 | End: 2021-04-21 | Stop reason: SDUPTHER

## 2020-12-15 RX ORDER — AMLODIPINE BESYLATE 10 MG/1
10 TABLET ORAL DAILY
Qty: 90 TABLET | Refills: 1 | Status: SHIPPED | OUTPATIENT
Start: 2020-12-15 | End: 2021-04-21 | Stop reason: SDUPTHER

## 2020-12-15 RX ORDER — METOPROLOL SUCCINATE 25 MG/1
25 TABLET, EXTENDED RELEASE ORAL DAILY
Qty: 90 TABLET | Refills: 1 | Status: SHIPPED | OUTPATIENT
Start: 2020-12-15 | End: 2021-04-21 | Stop reason: SDUPTHER

## 2020-12-15 RX ORDER — ALENDRONATE SODIUM 70 MG/1
70 TABLET ORAL
Qty: 4 TABLET | Refills: 11 | Status: SHIPPED | OUTPATIENT
Start: 2020-12-15 | End: 2021-04-21 | Stop reason: SDUPTHER

## 2020-12-15 RX ORDER — PRAVASTATIN SODIUM 40 MG/1
40 TABLET ORAL DAILY
Qty: 90 TABLET | Refills: 1 | Status: SHIPPED | OUTPATIENT
Start: 2020-12-15 | End: 2021-04-21 | Stop reason: SDUPTHER

## 2020-12-15 RX ORDER — METFORMIN HYDROCHLORIDE 1000 MG/1
TABLET ORAL
Qty: 180 TABLET | Refills: 1 | Status: SHIPPED | OUTPATIENT
Start: 2020-12-15 | End: 2021-04-21 | Stop reason: SDUPTHER

## 2020-12-15 RX ORDER — LOSARTAN POTASSIUM 100 MG/1
100 TABLET ORAL DAILY
Qty: 90 TABLET | Refills: 1 | Status: SHIPPED | OUTPATIENT
Start: 2020-12-15 | End: 2021-04-21 | Stop reason: SDUPTHER

## 2020-12-15 NOTE — PROGRESS NOTES
Subjective:       Patient ID: Carisa Curry is a 65 y.o. female.    Chief Complaint: Follow-up (6 month) and Immunizations (flu shot)    Carisa Curry  65 y.o. Black or  female    Patient presents with:  Follow-up: 6 month  Immunizations: flu shot    HPI: Here today to follow up on chronic conditions. She is here with her care provider.   Diabetes--A1C has decreased. She has been having low blood glucose readings. Her appetite is decreased. She is taking metformin and Trulicity.                       HGBA1C                   4.8                 12/09/2020            Microalbuminuria--compliant with losartan. Her kidney function is reduced. She admits to not drinking enough water.                      NA                       141                 12/09/2020                 K                        4.1                 12/09/2020                 CL                       103                 12/09/2020                 CO2                      27                  12/09/2020                 BUN                      20                  12/09/2020                 CREATININE               1.2                 12/09/2020                 CALCIUM                  9.1                 12/09/2020                 ANIONGAP                 11                  12/09/2020                 ESTGFRAFRICA             54.4 (A)            12/09/2020            HTN--stable on amlodipine, losartan, HCTZ and metoprolol.   Osteoporosis--compliant with alendronate and vitamin D.     Past Medical History:  Arthritis  CHF (congestive heart failure)  Diabetes mellitus, type 2  Diabetic retinopathy  Hyperlipidemia  Hypertension  Schizophrenia  Seizures      Comment:  reported per pt and sister, nothing since childhood    Current Outpatient Medications:     alendronate (FOSAMAX) 70 MG tablet, Take 1 tablet (70 mg total) by mouth every 7 days., Disp: 4 tablet, Rfl: 11    amLODIPine (NORVASC) 10 MG tablet, Take 1 tablet (10  "mg total) by mouth once daily., Disp: 90 tablet, Rfl: 1    ARIPiprazole (ABILIFY) 10 MG Tab, TAKE ONE TABLET BY MOUTH ONE TIME DAILY , Disp: 30 tablet, Rfl: 1    blood sugar diagnostic Strp, To check BG 2 times daily, to use with insurance preferred meter, Disp: 100 strip, Rfl: 0    blood-glucose meter kit, To check BG 2 times daily, to use with insurance preferred meter, Disp: 1 each, Rfl: 0    cholecalciferol, vitamin D3, (VITAMIN D3) 25 mcg (1,000 unit) capsule, Take 1 capsule (1,000 Units total) by mouth once daily., Disp: 30 capsule, Rfl: 11    hydroCHLOROthiazide (HYDRODIURIL) 25 MG tablet, Take 1 tablet (25 mg total) by mouth once daily., Disp: 90 tablet, Rfl: 1    insulin needles, disposable, (PEN NEEDLE) 31 X 5/16 " Ndle, Once a day insulin injection., Disp: 30 each, Rfl: 6    lancets Misc, To check BG 2 times daily, to use with insurance preferred meter, Disp: 100 each, Rfl: 0    losartan (COZAAR) 100 MG tablet, Take 1 tablet (100 mg total) by mouth once daily., Disp: 90 tablet, Rfl: 1    metFORMIN (GLUCOPHAGE) 1000 MG tablet, TAKE ONE TABLET BY MOUTH TWICE DAILY WITH MEALS, Disp: 180 tablet, Rfl: 1    metoprolol succinate (TOPROL-XL) 25 MG 24 hr tablet, Take 1 tablet (25 mg total) by mouth once daily., Disp: 90 tablet, Rfl: 1    nystatin (MYCOSTATIN) powder, Apply topically 4 (four) times daily., Disp: 60 g, Rfl: 1    potassium chloride SA (K-DUR,KLOR-CON) 10 MEQ tablet, Take 1 tablet (10 mEq total) by mouth once daily., Disp: 90 tablet, Rfl: 1    pravastatin (PRAVACHOL) 40 MG tablet, Take 1 tablet (40 mg total) by mouth once daily., Disp: 90 tablet, Rfl: 1    Allergies:  Review of patient's allergies indicates:  No Known Allergies          Review of Systems   Constitutional: Positive for appetite change. Negative for unexpected weight change.   Respiratory: Negative for shortness of breath.    Cardiovascular: Negative for chest pain.   Genitourinary: Negative for difficulty urinating. "   Neurological: Negative for dizziness and headaches.         Objective:      Physical Exam  Constitutional:       General: She is not in acute distress.     Appearance: She is well-developed. She is not ill-appearing.   Cardiovascular:      Pulses:           Dorsalis pedis pulses are 2+ on the right side and 2+ on the left side.   Pulmonary:      Effort: Pulmonary effort is normal. No respiratory distress.   Feet:      Right foot:      Protective Sensation: 5 sites tested. 5 sites sensed.      Skin integrity: Callus present. No ulcer.      Left foot:      Protective Sensation: 5 sites tested. 5 sites sensed.      Skin integrity: Callus present. No ulcer.   Neurological:      Mental Status: She is alert.   Psychiatric:         Behavior: Behavior normal.         Thought Content: Thought content normal.         Judgment: Judgment normal.         Assessment:       1. Controlled type 2 diabetes mellitus with microalbuminuria, without long-term current use of insulin    2. Renal insufficiency    3. Hypertension goal BP (blood pressure) < 130/80    4. Diuretic-induced hypokalemia    5. Hyperlipidemia LDL goal <70    6. Age-related osteoporosis without current pathological fracture    7. Colon cancer screening    8. Immunization due        Plan:       Carisa was seen today for follow-up and immunizations.    Diagnoses and all orders for this visit:    Controlled type 2 diabetes mellitus with microalbuminuria, without long-term current use of insulin  -     D/C Trulicity  -     Continue metFORMIN (GLUCOPHAGE) 1000 MG tablet; TAKE ONE TABLET BY MOUTH TWICE DAILY WITH MEALS  -     Microalbumin/Creatinine Ratio, Urine  -     POCT Glucose, Hand-Held Device--80    Renal insufficiency  -     Recommend increased water intake  -     Basic Metabolic Panel; Future    Hypertension goal BP (blood pressure) < 130/80  -     hydroCHLOROthiazide (HYDRODIURIL) 25 MG tablet; Take 1 tablet (25 mg total) by mouth once daily.  -     amLODIPine  (NORVASC) 10 MG tablet; Take 1 tablet (10 mg total) by mouth once daily.  -     losartan (COZAAR) 100 MG tablet; Take 1 tablet (100 mg total) by mouth once daily.  -     metoprolol succinate (TOPROL-XL) 25 MG 24 hr tablet; Take 1 tablet (25 mg total) by mouth once daily.    Diuretic-induced hypokalemia  -     Refill potassium chloride SA (K-DUR,KLOR-CON) 10 MEQ tablet; Take 1 tablet (10 mEq total) by mouth once daily.    Hyperlipidemia LDL goal <70  -     Refill pravastatin (PRAVACHOL) 40 MG tablet; Take 1 tablet (40 mg total) by mouth once daily.    Age-related osteoporosis without current pathological fracture  -     Refill alendronate (FOSAMAX) 70 MG tablet; Take 1 tablet (70 mg total) by mouth every 7 days.    Colon cancer screening  -     Fecal Immunochemical Test (iFOBT); Future    Immunization due  -     Influenza - Quadrivalent (Adjuvanted)    Labs and f/u in 3 months.

## 2021-01-27 ENCOUNTER — TELEPHONE (OUTPATIENT)
Dept: ADMINISTRATIVE | Facility: HOSPITAL | Age: 66
End: 2021-01-27

## 2021-01-27 DIAGNOSIS — E11.9 TYPE 2 DIABETES MELLITUS WITHOUT COMPLICATION: ICD-10-CM

## 2021-02-26 ENCOUNTER — PES CALL (OUTPATIENT)
Dept: ADMINISTRATIVE | Facility: CLINIC | Age: 66
End: 2021-02-26

## 2021-03-08 ENCOUNTER — TELEPHONE (OUTPATIENT)
Dept: ADMINISTRATIVE | Facility: HOSPITAL | Age: 66
End: 2021-03-08

## 2021-03-18 ENCOUNTER — PATIENT OUTREACH (OUTPATIENT)
Dept: ADMINISTRATIVE | Facility: OTHER | Age: 66
End: 2021-03-18

## 2021-03-18 DIAGNOSIS — Z12.31 ENCOUNTER FOR SCREENING MAMMOGRAM FOR BREAST CANCER: Primary | ICD-10-CM

## 2021-03-19 ENCOUNTER — OFFICE VISIT (OUTPATIENT)
Dept: PODIATRY | Facility: CLINIC | Age: 66
End: 2021-03-19
Payer: MEDICARE

## 2021-03-19 ENCOUNTER — PATIENT OUTREACH (OUTPATIENT)
Dept: ADMINISTRATIVE | Facility: OTHER | Age: 66
End: 2021-03-19

## 2021-03-19 VITALS — WEIGHT: 201.06 LBS | BODY MASS INDEX: 34.33 KG/M2 | HEIGHT: 64 IN

## 2021-03-19 DIAGNOSIS — B35.1 ONYCHOMYCOSIS: Primary | ICD-10-CM

## 2021-03-19 DIAGNOSIS — E11.42 TYPE 2 DIABETES MELLITUS WITH PERIPHERAL NEUROPATHY: ICD-10-CM

## 2021-03-19 DIAGNOSIS — E11.51 TYPE 2 DIABETES MELLITUS WITH DIABETIC PERIPHERAL ANGIOPATHY WITHOUT GANGRENE, WITH LONG-TERM CURRENT USE OF INSULIN: ICD-10-CM

## 2021-03-19 DIAGNOSIS — Z79.4 TYPE 2 DIABETES MELLITUS WITH DIABETIC PERIPHERAL ANGIOPATHY WITHOUT GANGRENE, WITH LONG-TERM CURRENT USE OF INSULIN: ICD-10-CM

## 2021-03-19 PROCEDURE — 11721 DEBRIDE NAIL 6 OR MORE: CPT | Mod: Q9,PBBFAC | Performed by: PODIATRIST

## 2021-03-19 PROCEDURE — 11721 DEBRIDE NAIL 6 OR MORE: CPT | Mod: Q9,S$PBB,, | Performed by: PODIATRIST

## 2021-03-19 PROCEDURE — 99499 UNLISTED E&M SERVICE: CPT | Mod: S$PBB,,, | Performed by: PODIATRIST

## 2021-03-19 PROCEDURE — 99499 NO LOS: ICD-10-PCS | Mod: S$PBB,,, | Performed by: PODIATRIST

## 2021-03-19 PROCEDURE — 99999 PR PBB SHADOW E&M-EST. PATIENT-LVL III: CPT | Mod: PBBFAC,,, | Performed by: PODIATRIST

## 2021-03-19 PROCEDURE — 99999 PR PBB SHADOW E&M-EST. PATIENT-LVL III: ICD-10-PCS | Mod: PBBFAC,,, | Performed by: PODIATRIST

## 2021-03-19 PROCEDURE — 11721 PR DEBRIDEMENT OF NAILS, 6 OR MORE: ICD-10-PCS | Mod: Q9,S$PBB,, | Performed by: PODIATRIST

## 2021-03-19 PROCEDURE — 99213 OFFICE O/P EST LOW 20 MIN: CPT | Mod: PBBFAC | Performed by: PODIATRIST

## 2021-04-09 ENCOUNTER — PES CALL (OUTPATIENT)
Dept: ADMINISTRATIVE | Facility: CLINIC | Age: 66
End: 2021-04-09

## 2021-04-21 ENCOUNTER — LAB VISIT (OUTPATIENT)
Dept: LAB | Facility: HOSPITAL | Age: 66
End: 2021-04-21
Attending: INTERNAL MEDICINE
Payer: MEDICARE

## 2021-04-21 ENCOUNTER — OFFICE VISIT (OUTPATIENT)
Dept: INTERNAL MEDICINE | Facility: CLINIC | Age: 66
End: 2021-04-21
Payer: MEDICARE

## 2021-04-21 VITALS
HEIGHT: 64 IN | TEMPERATURE: 98 F | SYSTOLIC BLOOD PRESSURE: 130 MMHG | OXYGEN SATURATION: 95 % | WEIGHT: 199.5 LBS | HEART RATE: 73 BPM | DIASTOLIC BLOOD PRESSURE: 72 MMHG | BODY MASS INDEX: 34.06 KG/M2

## 2021-04-21 DIAGNOSIS — E78.5 HYPERLIPIDEMIA LDL GOAL <70: ICD-10-CM

## 2021-04-21 DIAGNOSIS — E11.29 CONTROLLED TYPE 2 DIABETES MELLITUS WITH MICROALBUMINURIA, WITHOUT LONG-TERM CURRENT USE OF INSULIN: Primary | Chronic | ICD-10-CM

## 2021-04-21 DIAGNOSIS — I10 HYPERTENSION GOAL BP (BLOOD PRESSURE) < 130/80: ICD-10-CM

## 2021-04-21 DIAGNOSIS — E87.6 DIURETIC-INDUCED HYPOKALEMIA: ICD-10-CM

## 2021-04-21 DIAGNOSIS — I10 HYPERTENSION GOAL BP (BLOOD PRESSURE) < 130/80: Chronic | ICD-10-CM

## 2021-04-21 DIAGNOSIS — E66.9 OBESITY (BMI 30.0-34.9): ICD-10-CM

## 2021-04-21 DIAGNOSIS — M81.0 AGE-RELATED OSTEOPOROSIS WITHOUT CURRENT PATHOLOGICAL FRACTURE: ICD-10-CM

## 2021-04-21 DIAGNOSIS — F20.9 SCHIZOPHRENIA, UNSPECIFIED TYPE: ICD-10-CM

## 2021-04-21 DIAGNOSIS — R80.9 CONTROLLED TYPE 2 DIABETES MELLITUS WITH MICROALBUMINURIA, WITHOUT LONG-TERM CURRENT USE OF INSULIN: Primary | Chronic | ICD-10-CM

## 2021-04-21 DIAGNOSIS — T50.2X5A DIURETIC-INDUCED HYPOKALEMIA: ICD-10-CM

## 2021-04-21 PROCEDURE — 99999 PR PBB SHADOW E&M-EST. PATIENT-LVL IV: CPT | Mod: PBBFAC,,, | Performed by: INTERNAL MEDICINE

## 2021-04-21 PROCEDURE — 99214 OFFICE O/P EST MOD 30 MIN: CPT | Mod: S$PBB,,, | Performed by: INTERNAL MEDICINE

## 2021-04-21 PROCEDURE — 99214 PR OFFICE/OUTPT VISIT, EST, LEVL IV, 30-39 MIN: ICD-10-PCS | Mod: S$PBB,,, | Performed by: INTERNAL MEDICINE

## 2021-04-21 PROCEDURE — 99999 PR PBB SHADOW E&M-EST. PATIENT-LVL IV: ICD-10-PCS | Mod: PBBFAC,,, | Performed by: INTERNAL MEDICINE

## 2021-04-21 PROCEDURE — 36415 COLL VENOUS BLD VENIPUNCTURE: CPT | Performed by: INTERNAL MEDICINE

## 2021-04-21 PROCEDURE — 83036 HEMOGLOBIN GLYCOSYLATED A1C: CPT | Performed by: INTERNAL MEDICINE

## 2021-04-21 PROCEDURE — 80053 COMPREHEN METABOLIC PANEL: CPT | Performed by: INTERNAL MEDICINE

## 2021-04-21 PROCEDURE — 99214 OFFICE O/P EST MOD 30 MIN: CPT | Mod: PBBFAC | Performed by: INTERNAL MEDICINE

## 2021-04-21 RX ORDER — LANCETS 28 GAUGE
EACH MISCELLANEOUS DAILY
COMMUNITY
Start: 2021-03-18

## 2021-04-21 RX ORDER — AMLODIPINE BESYLATE 10 MG/1
10 TABLET ORAL DAILY
Qty: 90 TABLET | Refills: 1 | Status: SHIPPED | OUTPATIENT
Start: 2021-04-21 | End: 2021-10-14

## 2021-04-21 RX ORDER — METFORMIN HYDROCHLORIDE 1000 MG/1
TABLET ORAL
Qty: 180 TABLET | Refills: 1 | Status: SHIPPED | OUTPATIENT
Start: 2021-04-21 | End: 2022-01-26 | Stop reason: SDUPTHER

## 2021-04-21 RX ORDER — POTASSIUM CHLORIDE 750 MG/1
10 TABLET, EXTENDED RELEASE ORAL DAILY
Qty: 90 TABLET | Refills: 1 | Status: SHIPPED | OUTPATIENT
Start: 2021-04-21 | End: 2022-04-01

## 2021-04-21 RX ORDER — PRAVASTATIN SODIUM 40 MG/1
40 TABLET ORAL DAILY
Qty: 90 TABLET | Refills: 1 | Status: SHIPPED | OUTPATIENT
Start: 2021-04-21 | End: 2022-01-03 | Stop reason: SDUPTHER

## 2021-04-21 RX ORDER — LOSARTAN POTASSIUM 100 MG/1
100 TABLET ORAL DAILY
Qty: 90 TABLET | Refills: 1 | Status: SHIPPED | OUTPATIENT
Start: 2021-04-21 | End: 2021-10-17

## 2021-04-21 RX ORDER — HYDROCHLOROTHIAZIDE 25 MG/1
25 TABLET ORAL DAILY
Qty: 90 TABLET | Refills: 1 | Status: SHIPPED | OUTPATIENT
Start: 2021-04-21 | End: 2021-10-17

## 2021-04-21 RX ORDER — ALENDRONATE SODIUM 70 MG/1
70 TABLET ORAL
Qty: 12 TABLET | Refills: 1 | OUTPATIENT
Start: 2021-04-21 | End: 2022-01-03 | Stop reason: SDUPTHER

## 2021-04-21 RX ORDER — METOPROLOL SUCCINATE 25 MG/1
25 TABLET, EXTENDED RELEASE ORAL DAILY
Qty: 90 TABLET | Refills: 1 | Status: SHIPPED | OUTPATIENT
Start: 2021-04-21 | End: 2022-01-03 | Stop reason: SDUPTHER

## 2021-04-22 PROBLEM — E66.9 OBESITY (BMI 30.0-34.9): Status: ACTIVE | Noted: 2019-02-11

## 2021-04-22 PROBLEM — E66.811 OBESITY (BMI 30.0-34.9): Status: ACTIVE | Noted: 2019-02-11

## 2021-04-22 LAB
ALBUMIN SERPL BCP-MCNC: 4.1 G/DL (ref 3.5–5.2)
ALP SERPL-CCNC: 88 U/L (ref 55–135)
ALT SERPL W/O P-5'-P-CCNC: 12 U/L (ref 10–44)
ANION GAP SERPL CALC-SCNC: 11 MMOL/L (ref 8–16)
AST SERPL-CCNC: 11 U/L (ref 10–40)
BILIRUB SERPL-MCNC: 0.3 MG/DL (ref 0.1–1)
BUN SERPL-MCNC: 19 MG/DL (ref 8–23)
CALCIUM SERPL-MCNC: 9.5 MG/DL (ref 8.7–10.5)
CHLORIDE SERPL-SCNC: 102 MMOL/L (ref 95–110)
CO2 SERPL-SCNC: 28 MMOL/L (ref 23–29)
CREAT SERPL-MCNC: 1.1 MG/DL (ref 0.5–1.4)
EST. GFR  (AFRICAN AMERICAN): >60 ML/MIN/1.73 M^2
EST. GFR  (NON AFRICAN AMERICAN): 52.1 ML/MIN/1.73 M^2
ESTIMATED AVG GLUCOSE: 103 MG/DL (ref 68–131)
GLUCOSE SERPL-MCNC: 102 MG/DL (ref 70–110)
HBA1C MFR BLD: 5.2 % (ref 4–5.6)
POTASSIUM SERPL-SCNC: 4.3 MMOL/L (ref 3.5–5.1)
PROT SERPL-MCNC: 8.4 G/DL (ref 6–8.4)
SODIUM SERPL-SCNC: 141 MMOL/L (ref 136–145)

## 2021-04-23 ENCOUNTER — IMMUNIZATION (OUTPATIENT)
Dept: INTERNAL MEDICINE | Facility: CLINIC | Age: 66
End: 2021-04-23
Payer: MEDICARE

## 2021-04-23 DIAGNOSIS — Z23 NEED FOR VACCINATION: Primary | ICD-10-CM

## 2021-04-23 PROCEDURE — 91300 COVID-19, MRNA, LNP-S, PF, 30 MCG/0.3 ML DOSE VACCINE: CPT | Mod: PBBFAC

## 2021-04-27 ENCOUNTER — TELEPHONE (OUTPATIENT)
Dept: PSYCHIATRY | Facility: CLINIC | Age: 66
End: 2021-04-27

## 2021-04-28 ENCOUNTER — TELEPHONE (OUTPATIENT)
Dept: INTERNAL MEDICINE | Facility: CLINIC | Age: 66
End: 2021-04-28

## 2021-04-28 DIAGNOSIS — E11.59 HYPERTENSION ASSOCIATED WITH DIABETES: Primary | ICD-10-CM

## 2021-04-28 DIAGNOSIS — I15.2 HYPERTENSION ASSOCIATED WITH DIABETES: Primary | ICD-10-CM

## 2021-05-03 ENCOUNTER — TELEPHONE (OUTPATIENT)
Dept: ADMINISTRATIVE | Facility: HOSPITAL | Age: 66
End: 2021-05-03

## 2021-05-14 ENCOUNTER — IMMUNIZATION (OUTPATIENT)
Dept: INTERNAL MEDICINE | Facility: CLINIC | Age: 66
End: 2021-05-14
Payer: MEDICARE

## 2021-05-14 DIAGNOSIS — Z23 NEED FOR VACCINATION: Primary | ICD-10-CM

## 2021-05-14 PROCEDURE — 0002A COVID-19, MRNA, LNP-S, PF, 30 MCG/0.3 ML DOSE VACCINE: CPT | Mod: PBBFAC

## 2021-05-14 PROCEDURE — 91300 COVID-19, MRNA, LNP-S, PF, 30 MCG/0.3 ML DOSE VACCINE: CPT | Mod: PBBFAC

## 2021-05-19 ENCOUNTER — OFFICE VISIT (OUTPATIENT)
Dept: PSYCHIATRY | Facility: CLINIC | Age: 66
End: 2021-05-19
Payer: MEDICARE

## 2021-05-19 DIAGNOSIS — F20.9 SCHIZOPHRENIA, UNSPECIFIED TYPE: Primary | ICD-10-CM

## 2021-05-19 PROCEDURE — 99999 PR PBB SHADOW E&M-EST. PATIENT-LVL I: ICD-10-PCS | Mod: PBBFAC,,, | Performed by: PSYCHIATRY & NEUROLOGY

## 2021-05-19 PROCEDURE — 99214 OFFICE O/P EST MOD 30 MIN: CPT | Mod: S$PBB,,, | Performed by: PSYCHIATRY & NEUROLOGY

## 2021-05-19 PROCEDURE — 99999 PR PBB SHADOW E&M-EST. PATIENT-LVL I: CPT | Mod: PBBFAC,,, | Performed by: PSYCHIATRY & NEUROLOGY

## 2021-05-19 PROCEDURE — 99214 PR OFFICE/OUTPT VISIT, EST, LEVL IV, 30-39 MIN: ICD-10-PCS | Mod: S$PBB,,, | Performed by: PSYCHIATRY & NEUROLOGY

## 2021-05-19 PROCEDURE — 99211 OFF/OP EST MAY X REQ PHY/QHP: CPT | Mod: PBBFAC | Performed by: PSYCHIATRY & NEUROLOGY

## 2021-05-19 RX ORDER — BENZTROPINE MESYLATE 0.5 MG/1
0.5 TABLET ORAL 2 TIMES DAILY PRN
Qty: 60 TABLET | Refills: 5 | Status: SHIPPED | OUTPATIENT
Start: 2021-05-19 | End: 2021-11-12

## 2021-05-19 RX ORDER — ARIPIPRAZOLE 10 MG/1
10 TABLET ORAL DAILY
Qty: 30 TABLET | Refills: 5 | Status: SHIPPED | OUTPATIENT
Start: 2021-05-19 | End: 2021-11-12

## 2021-06-02 ENCOUNTER — PES CALL (OUTPATIENT)
Dept: ADMINISTRATIVE | Facility: CLINIC | Age: 66
End: 2021-06-02

## 2021-06-10 ENCOUNTER — TELEPHONE (OUTPATIENT)
Dept: ADMINISTRATIVE | Facility: HOSPITAL | Age: 66
End: 2021-06-10

## 2021-06-14 ENCOUNTER — PATIENT OUTREACH (OUTPATIENT)
Dept: ADMINISTRATIVE | Facility: OTHER | Age: 66
End: 2021-06-14

## 2021-06-25 ENCOUNTER — PES CALL (OUTPATIENT)
Dept: ADMINISTRATIVE | Facility: CLINIC | Age: 66
End: 2021-06-25

## 2021-07-09 ENCOUNTER — TELEPHONE (OUTPATIENT)
Dept: ADMINISTRATIVE | Facility: HOSPITAL | Age: 66
End: 2021-07-09

## 2021-07-20 ENCOUNTER — TELEPHONE (OUTPATIENT)
Dept: ADMINISTRATIVE | Facility: HOSPITAL | Age: 66
End: 2021-07-20

## 2021-09-20 ENCOUNTER — PES CALL (OUTPATIENT)
Dept: ADMINISTRATIVE | Facility: CLINIC | Age: 66
End: 2021-09-20

## 2021-10-14 DIAGNOSIS — I10 HYPERTENSION GOAL BP (BLOOD PRESSURE) < 130/80: Chronic | ICD-10-CM

## 2021-10-14 RX ORDER — AMLODIPINE BESYLATE 10 MG/1
TABLET ORAL
Qty: 90 TABLET | Refills: 1 | Status: SHIPPED | OUTPATIENT
Start: 2021-10-14 | End: 2022-01-26 | Stop reason: SDUPTHER

## 2021-10-16 DIAGNOSIS — E11.29 CONTROLLED TYPE 2 DIABETES MELLITUS WITH MICROALBUMINURIA, WITHOUT LONG-TERM CURRENT USE OF INSULIN: Chronic | ICD-10-CM

## 2021-10-16 DIAGNOSIS — R80.9 CONTROLLED TYPE 2 DIABETES MELLITUS WITH MICROALBUMINURIA, WITHOUT LONG-TERM CURRENT USE OF INSULIN: Chronic | ICD-10-CM

## 2021-10-16 DIAGNOSIS — I10 HYPERTENSION GOAL BP (BLOOD PRESSURE) < 130/80: Chronic | ICD-10-CM

## 2021-10-17 RX ORDER — LOSARTAN POTASSIUM 100 MG/1
TABLET ORAL
Qty: 90 TABLET | Refills: 2 | Status: SHIPPED | OUTPATIENT
Start: 2021-10-17 | End: 2022-01-03 | Stop reason: SDUPTHER

## 2021-10-17 RX ORDER — HYDROCHLOROTHIAZIDE 25 MG/1
TABLET ORAL
Qty: 90 TABLET | Refills: 2 | Status: SHIPPED | OUTPATIENT
Start: 2021-10-17 | End: 2022-01-03 | Stop reason: SDUPTHER

## 2021-10-22 ENCOUNTER — PATIENT OUTREACH (OUTPATIENT)
Dept: ADMINISTRATIVE | Facility: HOSPITAL | Age: 66
End: 2021-10-22

## 2021-11-17 ENCOUNTER — OFFICE VISIT (OUTPATIENT)
Dept: PSYCHIATRY | Facility: CLINIC | Age: 66
End: 2021-11-17
Payer: MEDICARE

## 2021-11-17 ENCOUNTER — PES CALL (OUTPATIENT)
Dept: ADMINISTRATIVE | Facility: CLINIC | Age: 66
End: 2021-11-17
Payer: MEDICARE

## 2021-11-17 DIAGNOSIS — I10 HYPERTENSION GOAL BP (BLOOD PRESSURE) < 130/80: Chronic | ICD-10-CM

## 2021-11-17 DIAGNOSIS — F20.9 SCHIZOPHRENIA, UNSPECIFIED TYPE: Primary | ICD-10-CM

## 2021-11-17 PROCEDURE — 99213 PR OFFICE/OUTPT VISIT, EST, LEVL III, 20-29 MIN: ICD-10-PCS | Mod: S$PBB,,, | Performed by: PSYCHIATRY & NEUROLOGY

## 2021-11-17 PROCEDURE — 99213 OFFICE O/P EST LOW 20 MIN: CPT | Mod: S$PBB,,, | Performed by: PSYCHIATRY & NEUROLOGY

## 2021-11-17 PROCEDURE — 99999 PR PBB SHADOW E&M-EST. PATIENT-LVL II: ICD-10-PCS | Mod: PBBFAC,,, | Performed by: PSYCHIATRY & NEUROLOGY

## 2021-11-17 PROCEDURE — 99999 PR PBB SHADOW E&M-EST. PATIENT-LVL II: CPT | Mod: PBBFAC,,, | Performed by: PSYCHIATRY & NEUROLOGY

## 2021-11-17 PROCEDURE — 99212 OFFICE O/P EST SF 10 MIN: CPT | Mod: PBBFAC | Performed by: PSYCHIATRY & NEUROLOGY

## 2021-11-17 RX ORDER — ARIPIPRAZOLE 10 MG/1
10 TABLET ORAL DAILY
Qty: 30 TABLET | Refills: 3 | Status: SHIPPED | OUTPATIENT
Start: 2021-11-17 | End: 2022-04-08

## 2021-11-17 RX ORDER — BENZTROPINE MESYLATE 0.5 MG/1
0.5 TABLET ORAL 2 TIMES DAILY PRN
Qty: 60 TABLET | Refills: 3 | Status: SHIPPED | OUTPATIENT
Start: 2021-11-17 | End: 2022-04-08

## 2021-11-23 ENCOUNTER — PATIENT OUTREACH (OUTPATIENT)
Dept: ADMINISTRATIVE | Facility: HOSPITAL | Age: 66
End: 2021-11-23
Payer: MEDICARE

## 2021-12-01 ENCOUNTER — TELEPHONE (OUTPATIENT)
Dept: ADMINISTRATIVE | Facility: HOSPITAL | Age: 66
End: 2021-12-01
Payer: MEDICARE

## 2021-12-13 PROBLEM — M81.0 OSTEOPOROSIS: Status: ACTIVE | Noted: 2021-12-13

## 2021-12-14 ENCOUNTER — TELEPHONE (OUTPATIENT)
Dept: ADMINISTRATIVE | Facility: HOSPITAL | Age: 66
End: 2021-12-14
Payer: MEDICARE

## 2021-12-30 ENCOUNTER — TELEPHONE (OUTPATIENT)
Dept: INTERNAL MEDICINE | Facility: CLINIC | Age: 66
End: 2021-12-30
Payer: MEDICARE

## 2022-01-03 ENCOUNTER — OFFICE VISIT (OUTPATIENT)
Dept: PODIATRY | Facility: CLINIC | Age: 67
End: 2022-01-03
Payer: MEDICARE

## 2022-01-03 ENCOUNTER — PATIENT OUTREACH (OUTPATIENT)
Dept: ADMINISTRATIVE | Facility: OTHER | Age: 67
End: 2022-01-03
Payer: MEDICARE

## 2022-01-03 DIAGNOSIS — E78.5 HYPERLIPIDEMIA LDL GOAL <70: ICD-10-CM

## 2022-01-03 DIAGNOSIS — E11.29 CONTROLLED TYPE 2 DIABETES MELLITUS WITH MICROALBUMINURIA, WITHOUT LONG-TERM CURRENT USE OF INSULIN: Chronic | ICD-10-CM

## 2022-01-03 DIAGNOSIS — I10 HYPERTENSION GOAL BP (BLOOD PRESSURE) < 130/80: Chronic | ICD-10-CM

## 2022-01-03 DIAGNOSIS — B37.2 INTERTRIGINOUS CANDIDIASIS: ICD-10-CM

## 2022-01-03 DIAGNOSIS — R80.9 CONTROLLED TYPE 2 DIABETES MELLITUS WITH MICROALBUMINURIA, WITHOUT LONG-TERM CURRENT USE OF INSULIN: Chronic | ICD-10-CM

## 2022-01-03 DIAGNOSIS — E11.51 TYPE 2 DIABETES MELLITUS WITH DIABETIC PERIPHERAL ANGIOPATHY WITHOUT GANGRENE, WITH LONG-TERM CURRENT USE OF INSULIN: Primary | ICD-10-CM

## 2022-01-03 DIAGNOSIS — Z79.4 TYPE 2 DIABETES MELLITUS WITH DIABETIC PERIPHERAL ANGIOPATHY WITHOUT GANGRENE, WITH LONG-TERM CURRENT USE OF INSULIN: Primary | ICD-10-CM

## 2022-01-03 DIAGNOSIS — M81.0 AGE-RELATED OSTEOPOROSIS WITHOUT CURRENT PATHOLOGICAL FRACTURE: ICD-10-CM

## 2022-01-03 DIAGNOSIS — E11.42 TYPE 2 DIABETES MELLITUS WITH PERIPHERAL NEUROPATHY: ICD-10-CM

## 2022-01-03 DIAGNOSIS — B35.1 ONYCHOMYCOSIS: ICD-10-CM

## 2022-01-03 PROCEDURE — 99499 NO LOS: ICD-10-PCS | Mod: S$PBB,,, | Performed by: PODIATRIST

## 2022-01-03 PROCEDURE — 99999 PR PBB SHADOW E&M-EST. PATIENT-LVL III: ICD-10-PCS | Mod: PBBFAC,,, | Performed by: PODIATRIST

## 2022-01-03 PROCEDURE — 11719 TRIM NAIL(S) ANY NUMBER: CPT | Mod: 59,Q9,S$PBB, | Performed by: PODIATRIST

## 2022-01-03 PROCEDURE — 11720 PR DEBRIDEMENT OF NAIL(S), 1-5: ICD-10-PCS | Mod: Q9,S$PBB,, | Performed by: PODIATRIST

## 2022-01-03 PROCEDURE — 11719 TRIM NAIL(S) ANY NUMBER: CPT | Mod: Q9,PBBFAC | Performed by: PODIATRIST

## 2022-01-03 PROCEDURE — 11719 PR TRIM NAIL(S): ICD-10-PCS | Mod: 59,Q9,S$PBB, | Performed by: PODIATRIST

## 2022-01-03 PROCEDURE — 11720 DEBRIDE NAIL 1-5: CPT | Mod: Q9,PBBFAC | Performed by: PODIATRIST

## 2022-01-03 PROCEDURE — 11720 DEBRIDE NAIL 1-5: CPT | Mod: Q9,S$PBB,, | Performed by: PODIATRIST

## 2022-01-03 PROCEDURE — 99213 OFFICE O/P EST LOW 20 MIN: CPT | Mod: PBBFAC | Performed by: PODIATRIST

## 2022-01-03 PROCEDURE — 99999 PR PBB SHADOW E&M-EST. PATIENT-LVL III: CPT | Mod: PBBFAC,,, | Performed by: PODIATRIST

## 2022-01-03 PROCEDURE — 99499 UNLISTED E&M SERVICE: CPT | Mod: S$PBB,,, | Performed by: PODIATRIST

## 2022-01-03 RX ORDER — ARIPIPRAZOLE 10 MG/1
10 TABLET ORAL DAILY
Qty: 30 TABLET | Refills: 3 | OUTPATIENT
Start: 2022-01-03

## 2022-01-03 RX ORDER — PRAVASTATIN SODIUM 40 MG/1
40 TABLET ORAL DAILY
Qty: 90 TABLET | Refills: 0 | Status: SHIPPED | OUTPATIENT
Start: 2022-01-03 | End: 2022-01-26 | Stop reason: SDUPTHER

## 2022-01-03 RX ORDER — LOSARTAN POTASSIUM 100 MG/1
100 TABLET ORAL DAILY
Qty: 90 TABLET | Refills: 0 | Status: SHIPPED | OUTPATIENT
Start: 2022-01-03 | End: 2022-01-26 | Stop reason: SDUPTHER

## 2022-01-03 RX ORDER — HYDROCHLOROTHIAZIDE 25 MG/1
25 TABLET ORAL DAILY
Qty: 90 TABLET | Refills: 0 | Status: SHIPPED | OUTPATIENT
Start: 2022-01-03 | End: 2022-01-26 | Stop reason: SDUPTHER

## 2022-01-03 RX ORDER — NYSTATIN 100000 [USP'U]/G
POWDER TOPICAL
Qty: 60 G | Refills: 0 | Status: SHIPPED | OUTPATIENT
Start: 2022-01-03 | End: 2022-01-26 | Stop reason: SDUPTHER

## 2022-01-03 RX ORDER — ALENDRONATE SODIUM 70 MG/1
70 TABLET ORAL
Qty: 12 TABLET | Refills: 0 | Status: SHIPPED | OUTPATIENT
Start: 2022-01-03 | End: 2022-01-26 | Stop reason: SDUPTHER

## 2022-01-03 RX ORDER — BENZTROPINE MESYLATE 0.5 MG/1
0.5 TABLET ORAL 2 TIMES DAILY PRN
Qty: 60 TABLET | Refills: 3 | OUTPATIENT
Start: 2022-01-03

## 2022-01-03 RX ORDER — METOPROLOL SUCCINATE 25 MG/1
25 TABLET, EXTENDED RELEASE ORAL DAILY
Qty: 90 TABLET | Refills: 0 | Status: SHIPPED | OUTPATIENT
Start: 2022-01-03 | End: 2022-01-26 | Stop reason: SDUPTHER

## 2022-01-03 NOTE — PROGRESS NOTES
Subjective:       Patient ID: Carisa Curry is a 66 y.o. female.    Chief Complaint: Routine Foot Care (Patient is a diabetic and was last seen on 4.21.21 by Dr. Guadalupe Guzman. Denies pain at present and is wearing socks and slip on shoes. )      HPI: Patient presents to the office today with the chief complaint of elongated, thickened and dystrophic nail plates to the B/L foot.  This patient is a Diabetic Type II, complicated with Peripheral Angiopathy and Peripheral Neuropathy. Patient does follow with Primary Care and/or Endocrinology for management of Diabetes Mellitus. This patient's PMD is Guadalupe Guzman DO. This patient last saw his/her primary care provider on 04/21/2021 with next appointment on 01/26/2022.    Hemoglobin A1C   Date Value Ref Range Status   04/21/2021 5.2 4.0 - 5.6 % Final     Comment:     ADA Screening Guidelines:  5.7-6.4%  Consistent with prediabetes  >or=6.5%  Consistent with diabetes    High levels of fetal hemoglobin interfere with the HbA1C  assay. Heterozygous hemoglobin variants (HbS, HgC, etc)do  not significantly interfere with this assay.   However, presence of multiple variants may affect accuracy.     12/09/2020 4.8 4.0 - 5.6 % Final     Comment:     ADA Screening Guidelines:  5.7-6.4%  Consistent with prediabetes  >or=6.5%  Consistent with diabetes  High levels of fetal hemoglobin interfere with the HbA1C  assay. Heterozygous hemoglobin variants (HbS, HgC, etc)do  not significantly interfere with this assay.   However, presence of multiple variants may affect accuracy.     06/08/2020 5.3 4.0 - 5.6 % Final     Comment:     ADA Screening Guidelines:  5.7-6.4%  Consistent with prediabetes  >or=6.5%  Consistent with diabetes  High levels of fetal hemoglobin interfere with the HbA1C  assay. Heterozygous hemoglobin variants (HbS, HgC, etc)do  not significantly interfere with this assay.   However, presence of multiple variants may affect accuracy.     .     Review of  patient's allergies indicates:  No Known Allergies    Past Medical History:   Diagnosis Date    Arthritis     CHF (congestive heart failure)     Diabetes mellitus, type 2     Diabetic retinopathy     Hyperlipidemia     Hypertension     Schizophrenia     Seizures     reported per pt and sister, nothing since childhood       Family History   Problem Relation Age of Onset    Kidney disease Mother     Heart failure Mother     Cataracts Mother     Hypertension Mother     Heart disease Mother     Stroke Mother     Cancer Father         brain    Diabetes Father     Diabetes Maternal Aunt     Hypertension Maternal Aunt     Diabetes Paternal Aunt     Hypertension Paternal Aunt     Heart disease Paternal Aunt     Diabetes Paternal Uncle     Heart disease Paternal Uncle        Social History     Socioeconomic History    Marital status: Single   Tobacco Use    Smoking status: Never Smoker    Smokeless tobacco: Never Used   Substance and Sexual Activity    Alcohol use: No     Alcohol/week: 0.0 standard drinks    Drug use: No    Sexual activity: Never       Past Surgical History:   Procedure Laterality Date    EYE SURGERY      INJECTION OF ANESTHETIC AGENT AROUND NERVE Bilateral 12/9/2019    Procedure: Bilateral Genicular nerve block (DIAGNOSTIC, NO STEROID) with RN IV sedation;  Surgeon: Butch Chao MD;  Location: [facility];  Service: Pain Management;  Laterality: Bilateral;    RADIOFREQUENCY THERMOCOAGULATION Right 12/23/2019    Procedure: Right Genicular Nerve RFA (COOLED) with RN IV sedation;  Surgeon: Butch Chao MD;  Location: [facility];  Service: Pain Management;  Laterality: Right;    RADIOFREQUENCY THERMOCOAGULATION Left 1/6/2020    Procedure: Left Genicular Nerve RFA (COOLED) with RN IV sedation;  Surgeon: Butch Chao MD;  Location: [facility];  Service: Pain Management;  Laterality: Left;    SPINE SURGERY         Review of Systems       Objective:   There  were no vitals taken for this visit.    Physical Exam  LOWER EXTREMITY PHYSICAL EXAMINATION    VASCULAR:  The right dorsalis pedis pulse 2/4 and the right posterior tibial pulse 1/4.  The left dorsalis pedis pulse 2/4 and posterior tibial pulse on the left is 1/4.  Capillary refill is intact.  Pedal hair growth decreased.     NEUROLOGY: Protective sensation is not intact to the left and right plantar surfaces of the foot and digits, as the patient has no sensation/detection at greater than 4 distinct points of contact with 5.07 [place] Jayesh monofilament. Sensation to light touch is intact on the left and right foot. Proprioception is intact, bilateral. Sensation to pin prick is reduced to absent. Vibratory sensation is diminished.    DERMATOLOGY:  Skin is supple, moist, intact.  There is no signs of callusing, ulcerations, other lesions identified to the dorsal or plantar aspect of the right or left foot.  The R1, 5 and left L1,5 are thickened, discolored dystrophic.  There is subungual debris.  Nail plates have area of dark discoloration.  The remaining nails 2-4 on the right foot and the left foot are elongated but of normal color, thickness, and texture.   There is no signs of ingrowing into the medial or lateral borders.  There is no evidence of wounds or skin breakdown.  No edema or erythema.  No obvious lacerations or fissuring.  Interdigital spaces are clean, dry, intact.  No rashes or scars appreciated.    ORTHOPEDIC: Manual Muscle Testing is 5/5 in all planes on the left and right, without pains, with and without resistance. Gait pattern is non-antalgic.    Assessment:     1. Type 2 diabetes mellitus with diabetic peripheral angiopathy without gangrene, with long-term current use of insulin    2. Type 2 diabetes mellitus with peripheral neuropathy    3. Onychomycosis        Plan:     Type 2 diabetes mellitus with diabetic peripheral angiopathy without gangrene, with long-term current use of  insulin    Type 2 diabetes mellitus with peripheral neuropathy    Onychomycosis        Thorough discussion is had with the patient this afternoon, concerning the diagnosis, its etiology, and the treatment algorithm at present.  Greater than 50% of this visit spent on counseling and coordination of care. Greater than 15 minutes of a 20 minute appointment spent on education about the diabetic foot, neuropathy, and prevention of limb loss.  Shoe inspection. Diabetic Foot Education. Patient reminded of the importance of good nutrition and blood sugar control to help prevent podiatric complications of diabetes. Patient instructed on proper foot hygeine. We discussed wearing proper and supportive shoe gear, daily foot inspections, never walking barefooted or sock footed, never putting sharp instruments to feet which can cause major complications associated with infection, ulcers, lacerations.      Dystrophic nail plates, as outlined above (R#1,5  ; L#1,5 ), are sharply debrided with double action nail nipper, and/or with the assistance of a mechanical rotary cristian, with removal of all offending nail and nail border(s), for reduction of pains. Nails are reduced in terms of length, width and girth with removal of subungual debris to facilitate pain free weight bearing and ambulation. The elongated nails as outlined in the objective portion of this note, were trimmed to appropriate length, with a double action nail nipper, for alleviation/reduction of pains as well. Follow up in approx. 3-4 months.      Future Appointments   Date Time Provider Department Center   1/19/2022  1:30 PM Jonas Major OD [facility]   1/26/2022 11:00 AM Heidy Bradley NP [facility]   3/14/2022  3:00 PM Bandar Piper MD [facility]   6/6/2022  1:15 PM Ivania Mir DPM [facility] POD [facility]

## 2022-01-03 NOTE — PROGRESS NOTES
Health Maintenance Due   Topic Date Due    Colorectal Cancer Screening  07/15/2016    Shingles Vaccine (2 of 3) 12/20/2017    Mammogram  08/27/2020    Diabetes Urine Screening  11/27/2020    Eye Exam  06/08/2021    Influenza Vaccine (1) 09/01/2021    Hemoglobin A1c  10/21/2021    COVID-19 Vaccine (3 - Booster for Pfizer series) 11/14/2021    Lipid Panel  12/09/2021     Updates were requested from care everywhere.  Chart was reviewed for overdue Proactive [facility] Encounters (MARIO) topics (CRS, Breast Cancer Screening, Eye exam)  Health Maintenance has been updated.  LINKS immunization registry triggered.  Immunizations were reconciled.

## 2022-01-03 NOTE — TELEPHONE ENCOUNTER
Care Due:                  Date            Visit Type   Department     Provider  --------------------------------------------------------------------------------                                             [facility]  Last Visit: 04-      None         HARESH Guzman  Next Visit: None Scheduled  None         None Found                                                            Last  Test          Frequency    Reason                     Performed    Due Date  --------------------------------------------------------------------------------    HBA1C.......  6 months...  metFORMIN................  04-   10-    Lipid Panel.  12 months..  pravastatin..............  Not Found    Overdue    Powered by [employer] Reference number: [id number].   1/03/2022 2:01:18 PM CST

## 2022-01-03 NOTE — TELEPHONE ENCOUNTER
I spoke with caregiver/sister Mery and she stated she did not need the 2 medications prescribed by psychiatry refilled at the moment. She has scheduled Ms Younger's follow up with Dr Guzman for 1/26/22 after AWV with Heidy Bradley.

## 2022-01-26 ENCOUNTER — OFFICE VISIT (OUTPATIENT)
Dept: INTERNAL MEDICINE | Facility: CLINIC | Age: 67
End: 2022-01-26
Payer: MEDICARE

## 2022-01-26 VITALS
WEIGHT: 204.56 LBS | HEIGHT: 64 IN | HEART RATE: 70 BPM | OXYGEN SATURATION: 99 % | DIASTOLIC BLOOD PRESSURE: 88 MMHG | BODY MASS INDEX: 34.92 KG/M2 | SYSTOLIC BLOOD PRESSURE: 128 MMHG

## 2022-01-26 VITALS
RESPIRATION RATE: 18 BRPM | HEIGHT: 64 IN | SYSTOLIC BLOOD PRESSURE: 128 MMHG | DIASTOLIC BLOOD PRESSURE: 88 MMHG | WEIGHT: 204.56 LBS | OXYGEN SATURATION: 99 % | BODY MASS INDEX: 34.92 KG/M2 | HEART RATE: 70 BPM | TEMPERATURE: 98 F

## 2022-01-26 DIAGNOSIS — I10 HYPERTENSION GOAL BP (BLOOD PRESSURE) < 130/80: Chronic | ICD-10-CM

## 2022-01-26 DIAGNOSIS — B37.2 INTERTRIGINOUS CANDIDIASIS: ICD-10-CM

## 2022-01-26 DIAGNOSIS — Z23 IMMUNIZATION DUE: ICD-10-CM

## 2022-01-26 DIAGNOSIS — Z00.00 ANNUAL PHYSICAL EXAM: Primary | ICD-10-CM

## 2022-01-26 DIAGNOSIS — M81.0 OSTEOPOROSIS, UNSPECIFIED OSTEOPOROSIS TYPE, UNSPECIFIED PATHOLOGICAL FRACTURE PRESENCE: ICD-10-CM

## 2022-01-26 DIAGNOSIS — E66.01 SEVERE OBESITY (BMI 35.0-39.9) WITH COMORBIDITY: ICD-10-CM

## 2022-01-26 DIAGNOSIS — Z74.09 OTHER REDUCED MOBILITY: ICD-10-CM

## 2022-01-26 DIAGNOSIS — Z99.89 DEPENDENCE ON OTHER ENABLING MACHINES AND DEVICES: ICD-10-CM

## 2022-01-26 DIAGNOSIS — Z74.1 REQUIRES ASSISTANCE WITH ACTIVITIES OF DAILY LIVING (ADL): ICD-10-CM

## 2022-01-26 DIAGNOSIS — E11.29 CONTROLLED TYPE 2 DIABETES MELLITUS WITH MICROALBUMINURIA, WITHOUT LONG-TERM CURRENT USE OF INSULIN: Chronic | ICD-10-CM

## 2022-01-26 DIAGNOSIS — H54.7 VISION PROBLEMS: ICD-10-CM

## 2022-01-26 DIAGNOSIS — R41.3 MEMORY DIFFICULTIES: ICD-10-CM

## 2022-01-26 DIAGNOSIS — E11.69 HYPERLIPIDEMIA ASSOCIATED WITH TYPE 2 DIABETES MELLITUS: ICD-10-CM

## 2022-01-26 DIAGNOSIS — Z12.11 COLON CANCER SCREENING: ICD-10-CM

## 2022-01-26 DIAGNOSIS — R26.9 ABNORMALITY OF GAIT AND MOBILITY: ICD-10-CM

## 2022-01-26 DIAGNOSIS — Z00.00 ENCOUNTER FOR PREVENTIVE HEALTH EXAMINATION: Primary | ICD-10-CM

## 2022-01-26 DIAGNOSIS — E78.5 HYPERLIPIDEMIA ASSOCIATED WITH TYPE 2 DIABETES MELLITUS: ICD-10-CM

## 2022-01-26 DIAGNOSIS — E78.5 HYPERLIPIDEMIA LDL GOAL <70: ICD-10-CM

## 2022-01-26 DIAGNOSIS — M81.0 AGE-RELATED OSTEOPOROSIS WITHOUT CURRENT PATHOLOGICAL FRACTURE: ICD-10-CM

## 2022-01-26 DIAGNOSIS — F20.9 SCHIZOPHRENIA, UNSPECIFIED TYPE: ICD-10-CM

## 2022-01-26 DIAGNOSIS — R80.9 CONTROLLED TYPE 2 DIABETES MELLITUS WITH MICROALBUMINURIA, WITHOUT LONG-TERM CURRENT USE OF INSULIN: Chronic | ICD-10-CM

## 2022-01-26 PROCEDURE — 99215 OFFICE O/P EST HI 40 MIN: CPT | Mod: PBBFAC | Performed by: NURSE PRACTITIONER

## 2022-01-26 PROCEDURE — 99999 PR PBB SHADOW E&M-EST. PATIENT-LVL V: ICD-10-PCS | Mod: PBBFAC,,, | Performed by: NURSE PRACTITIONER

## 2022-01-26 PROCEDURE — 99214 OFFICE O/P EST MOD 30 MIN: CPT | Mod: S$PBB,,, | Performed by: INTERNAL MEDICINE

## 2022-01-26 PROCEDURE — 99999 PR PBB SHADOW E&M-EST. PATIENT-LVL V: CPT | Mod: PBBFAC,,, | Performed by: NURSE PRACTITIONER

## 2022-01-26 PROCEDURE — G0439 PPPS, SUBSEQ VISIT: HCPCS | Mod: ,,, | Performed by: NURSE PRACTITIONER

## 2022-01-26 PROCEDURE — G0439 PR MEDICARE ANNUAL WELLNESS SUBSEQUENT VISIT: ICD-10-PCS | Mod: ,,, | Performed by: NURSE PRACTITIONER

## 2022-01-26 PROCEDURE — 99999 PR PBB SHADOW E&M-EST. PATIENT-LVL IV: CPT | Mod: PBBFAC,,, | Performed by: INTERNAL MEDICINE

## 2022-01-26 PROCEDURE — G0008 ADMIN INFLUENZA VIRUS VAC: HCPCS | Mod: PBBFAC

## 2022-01-26 PROCEDURE — 99214 OFFICE O/P EST MOD 30 MIN: CPT | Mod: PBBFAC,27,25 | Performed by: INTERNAL MEDICINE

## 2022-01-26 PROCEDURE — 99999 PR PBB SHADOW E&M-EST. PATIENT-LVL IV: ICD-10-PCS | Mod: PBBFAC,,, | Performed by: INTERNAL MEDICINE

## 2022-01-26 PROCEDURE — 99214 PR OFFICE/OUTPT VISIT, EST, LEVL IV, 30-39 MIN: ICD-10-PCS | Mod: S$PBB,,, | Performed by: INTERNAL MEDICINE

## 2022-01-26 RX ORDER — HYDROCHLOROTHIAZIDE 25 MG/1
25 TABLET ORAL DAILY
Qty: 90 TABLET | Refills: 3 | Status: SHIPPED | OUTPATIENT
Start: 2022-01-26 | End: 2022-12-29 | Stop reason: SDUPTHER

## 2022-01-26 RX ORDER — LOSARTAN POTASSIUM 100 MG/1
100 TABLET ORAL DAILY
Qty: 90 TABLET | Refills: 3 | Status: SHIPPED | OUTPATIENT
Start: 2022-01-26 | End: 2022-12-29 | Stop reason: SDUPTHER

## 2022-01-26 RX ORDER — METFORMIN HYDROCHLORIDE 1000 MG/1
TABLET ORAL
Qty: 180 TABLET | Refills: 3 | Status: SHIPPED | OUTPATIENT
Start: 2022-01-26 | End: 2022-12-29 | Stop reason: SDUPTHER

## 2022-01-26 RX ORDER — NYSTATIN 100000 [USP'U]/G
POWDER TOPICAL 3 TIMES DAILY
Qty: 60 G | Refills: 3 | Status: SHIPPED | OUTPATIENT
Start: 2022-01-26 | End: 2024-01-24

## 2022-01-26 RX ORDER — ALENDRONATE SODIUM 70 MG/1
70 TABLET ORAL
Qty: 12 TABLET | Refills: 3 | Status: SHIPPED | OUTPATIENT
Start: 2022-01-26 | End: 2022-12-13

## 2022-01-26 RX ORDER — METOPROLOL SUCCINATE 25 MG/1
25 TABLET, EXTENDED RELEASE ORAL DAILY
Qty: 90 TABLET | Refills: 3 | Status: SHIPPED | OUTPATIENT
Start: 2022-01-26 | End: 2023-03-03

## 2022-01-26 RX ORDER — AMLODIPINE BESYLATE 10 MG/1
10 TABLET ORAL DAILY
Qty: 90 TABLET | Refills: 3 | Status: SHIPPED | OUTPATIENT
Start: 2022-01-26 | End: 2022-12-29 | Stop reason: SDUPTHER

## 2022-01-26 RX ORDER — PRAVASTATIN SODIUM 40 MG/1
40 TABLET ORAL DAILY
Qty: 90 TABLET | Refills: 3 | Status: SHIPPED | OUTPATIENT
Start: 2022-01-26 | End: 2022-12-29 | Stop reason: SDUPTHER

## 2022-01-26 NOTE — PROGRESS NOTES
Carisa Curry  67 y.o. Black or  female  [id number]    Chief Complaint:  Chief Complaint   Patient presents with    Annual Exam       History of Present Illness:  Presents to the clinic for an annual exam.   She is here with her sister.   She is without significant complaints.   She needs refills on all of her medications.   HTN--stable on losartan, HCTZ, amlodipine and metoprolol.   DM--compliant with metformin. Her home glucose readings have been good.   Microalbuminuria--compliant with losartan.   HLD--compliant with pravastatin. She is due for labs.   Lab Results   Component Value Date    LDLCALC 101.0 12/09/2020      CHOL 177 12/09/2020    TRIG 75 12/09/2020    HDL 61 12/09/2020    ALT 12 04/21/2021    AST 11 04/21/2021     04/21/2021    K 4.3 04/21/2021     04/21/2021    CREATININE 1.1 04/21/2021    BUN 19 04/21/2021    CO2 28 04/21/2021    TSH 0.721 07/01/2015    HGBA1C 5.2 04/21/2021     She needs refills on nystatin powder. She uses it under her breasts as needed.   Osteoporosis--compliant with alendronate and vitamin D.     Review of Systems   Constitutional: Negative for fever and weight loss.   Respiratory: Negative for cough and shortness of breath.    Cardiovascular: Negative for chest pain and leg swelling.   Gastrointestinal: Negative for abdominal pain, blood in stool, constipation, diarrhea and melena.   Genitourinary: Negative for dysuria.   Neurological: Negative for dizziness and headaches.       The following were reviewed: Active problem list, medication list, allergies, family history, social history, and Health Maintenance.     History:  Past Medical History:   Diagnosis Date    Arthritis     CHF (congestive heart failure)     Diabetes mellitus, type 2     Diabetic retinopathy     Hyperlipidemia     Hypertension     Schizophrenia     Seizures     reported per pt and sister, nothing since childhood     Past Surgical History:   Procedure Laterality  Date    EYE SURGERY      INJECTION OF ANESTHETIC AGENT AROUND NERVE Bilateral 12/9/2019    Procedure: Bilateral Genicular nerve block (DIAGNOSTIC, NO STEROID) with RN IV sedation;  Surgeon: Butch Chao MD;  Location: [facility];  Service: Pain Management;  Laterality: Bilateral;    RADIOFREQUENCY THERMOCOAGULATION Right 12/23/2019    Procedure: Right Genicular Nerve RFA (COOLED) with RN IV sedation;  Surgeon: Butch Chao MD;  Location: [facility];  Service: Pain Management;  Laterality: Right;    RADIOFREQUENCY THERMOCOAGULATION Left 1/6/2020    Procedure: Left Genicular Nerve RFA (COOLED) with RN IV sedation;  Surgeon: Butch Chao MD;  Location: [facility];  Service: Pain Management;  Laterality: Left;    SPINE SURGERY       Family History   Problem Relation Age of Onset    Kidney disease Mother     Heart failure Mother     Cataracts Mother     Hypertension Mother     Heart disease Mother     Stroke Mother     Cancer Father         brain    Diabetes Father     Diabetes Maternal Aunt     Hypertension Maternal Aunt     Diabetes Paternal Aunt     Hypertension Paternal Aunt     Heart disease Paternal Aunt     Diabetes Paternal Uncle     Heart disease Paternal Uncle      Social History     Socioeconomic History    Marital status: Single    Number of children: 0   Tobacco Use    Smoking status: Never Smoker    Smokeless tobacco: Never Used   Substance and Sexual Activity    Alcohol use: No     Alcohol/week: 0.0 standard drinks    Drug use: No    Sexual activity: Never     Social Determinants of Health     Financial Resource Strain: Low Risk     Difficulty of Paying Living Expenses: Not hard at all   Food Insecurity: No Food Insecurity    Worried About Running Out of Food in the Last Year: Never true    Ran Out of Food in the Last Year: Never true   Transportation Needs: No Transportation Needs    Lack of Transportation (Medical): No    Lack of Transportation  (Non-Medical): No   Physical Activity: Insufficiently Active    Days of Exercise per Week: 4 days    Minutes of Exercise per Session: 30 min   Stress: No Stress Concern Present    Feeling of Stress : Not at all   Social Connections: Socially Isolated    Frequency of Communication with Friends and Family: Never    Frequency of Social Gatherings with Friends and Family: More than three times a week    Attends Confucianism Services: Never    Active Member of Clubs or Organizations: No    Attends Club or Organization Meetings: Never    Marital Status: Never    Housing Stability: Low Risk     Unable to Pay for Housing in the Last Year: No    Number of Places Lived in the Last Year: 1    Unstable Housing in the Last Year: No     Patient Active Problem List   Diagnosis    Hypertension goal BP (blood pressure) < 130/80    Arthritis involving multiple sites    Controlled type 2 diabetes mellitus with microalbuminuria, without long-term current use of insulin    Hyperlipidemia LDL goal <70    Severe obesity (BMI 35.0-39.9) with comorbidity    At risk for falls    Schizophrenia    Primary osteoarthritis of left knee    Primary osteoarthritis of right knee    Knee pain, chronic    Osteoporosis     Review of patient's allergies indicates:  No Known Allergies    Health Maintenance  Health Maintenance Topics with due status: Not Due       Topic Last Completion Date    Pneumococcal Vaccines (Age 65+) 06/15/2017    TETANUS VACCINE 05/27/2019    DEXA SCAN 11/27/2019    Low Dose Statin 01/26/2022     Health Maintenance Due   Topic Date Due    Colorectal Cancer Screening  07/15/2016    Shingles Vaccine (2 of 3) 12/20/2017    Mammogram  08/27/2020    Diabetes Urine Screening  11/27/2020    Eye Exam  06/08/2021    Hemoglobin A1c  10/21/2021    COVID-19 Vaccine (3 - Booster for Pfizer series) 11/14/2021    Lipid Panel  12/09/2021    Foot Exam  03/19/2022       Current Outpatient Medications:      "ARIPiprazole (ABILIFY) 10 MG Tab, Take 1 tablet (10 mg total) by mouth once daily., Disp: 30 tablet, Rfl: 3    benztropine (COGENTIN) 0.5 MG tablet, Take 1 tablet (0.5 mg total) by mouth 2 (two) times daily as needed., Disp: 60 tablet, Rfl: 3    blood sugar diagnostic Strp, To check BG 2 times daily, to use with insurance preferred meter, Disp: 100 strip, Rfl: 0    blood-glucose meter kit, To check BG 2 times daily, to use with insurance preferred meter, Disp: 1 each, Rfl: 0    cholecalciferol, vitamin D3, (VITAMIN D3) 25 mcg (1,000 unit) capsule, Take 1 capsule (1,000 Units total) by mouth once daily., Disp: 30 capsule, Rfl: 11    FREESTYLE LANCETS 28 gauge lancets, Apply topically once daily., Disp: , Rfl:     insulin needles, disposable, (PEN NEEDLE) 31 X 5/16 " Ndle, Once a day insulin injection., Disp: 30 each, Rfl: 6    lancets Misc, To check BG 2 times daily, to use with insurance preferred meter, Disp: 100 each, Rfl: 0    potassium chloride SA (K-DUR,KLOR-CON) 10 MEQ tablet, Take 1 tablet (10 mEq total) by mouth once daily., Disp: 90 tablet, Rfl: 1    alendronate (FOSAMAX) 70 MG tablet, Take 1 tablet (70 mg total) by mouth every 7 days., Disp: 12 tablet, Rfl: 3    amLODIPine (NORVASC) 10 MG tablet, Take 1 tablet (10 mg total) by mouth once daily., Disp: 90 tablet, Rfl: 3    hydroCHLOROthiazide (HYDRODIURIL) 25 MG tablet, Take 1 tablet (25 mg total) by mouth once daily., Disp: 90 tablet, Rfl: 3    losartan (COZAAR) 100 MG tablet, Take 1 tablet (100 mg total) by mouth once daily., Disp: 90 tablet, Rfl: 3    metFORMIN (GLUCOPHAGE) 1000 MG tablet, TAKE ONE TABLET BY MOUTH TWICE DAILY WITH MEALS, Disp: 180 tablet, Rfl: 3    metoprolol succinate (TOPROL-XL) 25 MG 24 hr tablet, Take 1 tablet (25 mg total) by mouth once daily., Disp: 90 tablet, Rfl: 3    nystatin (MYCOSTATIN) powder, Apply topically 3 (three) times daily., Disp: 60 g, Rfl: 3    pravastatin (PRAVACHOL) 40 MG tablet, Take 1 tablet (40 " mg total) by mouth once daily., Disp: 90 tablet, Rfl: 3      Medications have been reviewed and reconciled with patient at visit today.    Exam:  Vitals:    01/26/22 1158   BP: 128/88   Pulse: 70   Resp: 18   Temp: 97.8 °F (36.6 °C)     Weight: 92.8 kg (204 lb 9.4 oz)   Body mass index is 35.12 kg/m².      Physical Exam  Vitals reviewed.   Constitutional:       General: She is not in acute distress.     Appearance: She is well-developed. She is not ill-appearing.   Eyes:      General: No scleral icterus.  Cardiovascular:      Rate and Rhythm: Normal rate and regular rhythm.      Heart sounds: Normal heart sounds.   Pulmonary:      Effort: Pulmonary effort is normal. No respiratory distress.      Breath sounds: Normal breath sounds. No wheezing or rales.   Abdominal:      General: Bowel sounds are normal.      Palpations: Abdomen is soft.   Musculoskeletal:      Right lower leg: No edema.      Left lower leg: No edema.   Neurological:      Mental Status: She is alert.   Psychiatric:         Behavior: Behavior normal.       Assessment:  The primary encounter diagnosis was Annual physical exam. Diagnoses of Hypertension goal BP (blood pressure) < 130/80, Controlled type 2 diabetes mellitus with microalbuminuria, without long-term current use of insulin, Hyperlipidemia LDL goal <70, Intertriginous candidiasis, Age-related osteoporosis without current pathological fracture, Colon cancer screening, and Immunization due were also pertinent to this visit.    Plan:  Annual physical exam  -     Counseled regarding age appropriate screenings and immunizations  -     Counseled regarding lifestyle modifications    Hypertension goal BP (blood pressure) < 130/80  -     Lipid Panel; Standing  -     metoprolol succinate (TOPROL-XL) 25 MG 24 hr tablet; Take 1 tablet (25 mg total) by mouth once daily.  Dispense: 90 tablet; Refill: 3  -     hydroCHLOROthiazide (HYDRODIURIL) 25 MG tablet; Take 1 tablet (25 mg total) by mouth once daily.   Dispense: 90 tablet; Refill: 3  -     losartan (COZAAR) 100 MG tablet; Take 1 tablet (100 mg total) by mouth once daily.  Dispense: 90 tablet; Refill: 3  -     amLODIPine (NORVASC) 10 MG tablet; Take 1 tablet (10 mg total) by mouth once daily.  Dispense: 90 tablet; Refill: 3    Controlled type 2 diabetes mellitus with microalbuminuria, without long-term current use of insulin  -     Lipid Panel; Standing  -     Microalbumin/Creatinine Ratio, Urine; Future  -     metFORMIN (GLUCOPHAGE) 1000 MG tablet; TAKE ONE TABLET BY MOUTH TWICE DAILY WITH MEALS  Dispense: 180 tablet; Refill: 3  -     losartan (COZAAR) 100 MG tablet; Take 1 tablet (100 mg total) by mouth once daily.  Dispense: 90 tablet; Refill: 3    Hyperlipidemia LDL goal <70  -     Lipid Panel; Standing  -     pravastatin (PRAVACHOL) 40 MG tablet; Take 1 tablet (40 mg total) by mouth once daily.  Dispense: 90 tablet; Refill: 3    Intertriginous candidiasis  -     nystatin (MYCOSTATIN) powder; Apply topically 3 (three) times daily.  Dispense: 60 g; Refill: 3    Age-related osteoporosis without current pathological fracture  -     alendronate (FOSAMAX) 70 MG tablet; Take 1 tablet (70 mg total) by mouth every 7 days.  Dispense: 12 tablet; Refill: 3    Colon cancer screening  -     Fecal Immunochemical Test (iFOBT); Future; Expected date: 01/26/2022    Immunization due  -     Influenza - Quadrivalent (Adjuvanted)    Schedule labs.     Follow up in about 6 months (around 7/26/2022).

## 2022-01-26 NOTE — PROGRESS NOTES
"  Carisa Curry presented for a  Medicare AWV and comprehensive Health Risk Assessment today. The following components were reviewed and updated:    · Medical history  · Family History  · Social history  · Allergies and Current Medications  · Health Risk Assessment  · Health Maintenance  · Care Team         ** See Completed Assessments for Annual Wellness Visit within the encounter summary.**         The following assessments were completed:  · Living Situation  · CAGE  · Depression Screening  · Timed Get Up and Go  · Whisper Test  · Cognitive Function Screening  · Nutrition Screening  · ADL Screening  · PAQ Screening        Vitals:    01/26/22 1109   BP: 128/88   BP Location: Left arm   Patient Position: Sitting   Pulse: 70   SpO2: 99%   Weight: 92.8 kg (204 lb 9.4 oz)   Height: 5' 4" (1.626 m)     Body mass index is 35.12 kg/m².  Physical Exam  Vitals and nursing note reviewed.   Constitutional:       Appearance: She is well-developed.      Comments: Patient accompanied by sister, who was present throughout the visit and aided in information gathering.      HENT:      Head: Normocephalic.   Cardiovascular:      Rate and Rhythm: Normal rate and regular rhythm.      Heart sounds: Normal heart sounds.   Pulmonary:      Effort: Pulmonary effort is normal. No respiratory distress.      Breath sounds: Decreased breath sounds present.   Abdominal:      Palpations: Abdomen is soft. There is no mass.      Tenderness: There is no abdominal tenderness.   Musculoskeletal:         General: Normal range of motion.   Skin:     General: Skin is warm and dry.   Neurological:      Mental Status: She is alert.      Motor: No abnormal muscle tone.      Comments: Oriented to person place and month, unable to give correct year   Psychiatric:         Speech: Speech normal.         Behavior: Behavior normal.               Diagnoses and health risks identified today and associated recommendations/orders:    1. Encounter for preventive " health examination  They will discuss Health maintenance with PCP at apt following AWV.   They will call to schedule COVID booster    MA to schedule  Optometry     Discussed s/s of heart failure (patient denies any s/s) and advised to follow up with PCP/ER (if severe) if occur.They  expressed understanding.    Discussed s/s of   stroke (patient denies any s/s) and advised to go to the ER if occur. They expressed understanding.   Reports she is at her respiratory baseline. Will follow up with PCP for decreased breath sounds.     2. Hypertension goal BP (blood pressure) < 130/80  Continue current treatment plan as previously prescribed with your  pcp     3. Hyperlipidemia associated with type 2 diabetes mellitus  Advised to follow up with PCP for monitoring. They  expressed understanding.      4. Schizophrenia, unspecified type  PHQ 2-1; reports psychiatrist is aware of this  Reports stable  Continue current treatment plan as previously prescribed with your  Psychiatrist.     5. Severe obesity (BMI 35.0-39.9) with comorbidity  Encouraged healthy diet and exercise as tolerated to help bring BMI into normal range.    Continue current treatment plan as previously prescribed with your  pcp     6. Osteoporosis, unspecified osteoporosis type, unspecified pathological fracture presence  dexa 11/19  Continue current treatment plan as previously prescribed with your  pcp     7. Vision problems  Reports intermittent blurry vision over the last month  Advised to follow up with PCP/optometrist for further evaluation and recommendations. They expressed understanding.      8. Memory difficulties  Reported per pt and sister  Sister and niece help with ADLs  Abnormal cognitive function screening.    Advised to follow up with PCP for further evaluation and recommendations. They expressed understanding.      9. Requires assistance with activities of daily living (ADL)  See #8  - Ambulatory referral/consult to Outpatient Case  Management    10. Dependence on other enabling machines and devices  Abnormal timed get up and go test. Denies any falls in the last 12 months. Uses a cane to aid with ambulation.   Fall precautions reviewed with patient. Advised to follow up with PCP for further recommendations. Theyexpressed understanding.       11. Abnormality of gait and mobility  See #10    12. Other reduced mobility  See #10      Provided Carisa with a 5-10 year written screening schedule and personal prevention plan. Recommendations were developed using the USPSTF age appropriate recommendations. Education, counseling, and referrals were provided as needed. After Visit Summary printed and given to patient which includes a list of additional screenings\tests needed.    Follow up in about 1 year (around 1/26/2023) for awv.    Heidy Bradley NP  I offered to discuss advanced care planning, including how to pick a person who would make decisions for you if you were unable to make them for yourself, called a health care power of , and what kind of decisions you might make such as use of life sustaining treatments such as ventilators and tube feeding when faced with a life limiting illness recorded on a living will that they will need to know. (How you want to be cared for as you near the end of your natural life)     X  Patient is unable to engage in a discussion regarding advanced directives due to severe physical and/or cognitive impairment. THEY ARE REPORTING ISSUES WITH MEMORY AND COGNITIVE FUNCTION SCREENING WAS ABNORMAL; WILL HAVE PCP ADDRESS MEMORY ISSUES PRIOR TO DISCUSSION

## 2022-01-26 NOTE — Clinical Note
Appointment today Your patient was seen for AWV. Abnormalities bolded. Please review.  Thank you,  BRIAN Gunn

## 2022-01-26 NOTE — PATIENT INSTRUCTIONS
Counseling and Referral of Other Preventative  (Italic type indicates deductible and co-insurance are waived)    Patient Name: Carisa Curry  Today's Date: 1/26/2022    Health Maintenance       Date Due Completion Date    Colorectal Cancer Screening 07/15/2016 7/15/2015    Shingles Vaccine (2 of 3) 12/20/2017 10/25/2017    Mammogram 08/27/2020 8/27/2019    Diabetes Urine Screening 11/27/2020 11/27/2019    Eye Exam 06/08/2021 6/8/2020    Override on 6/12/2017: Done    Influenza Vaccine (1) 09/01/2021 12/15/2020    Hemoglobin A1c 10/21/2021 4/21/2021    COVID-19 Vaccine (3 - Booster for Pfizer series) 11/14/2021 5/14/2021    Lipid Panel 12/09/2021 12/9/2020    Foot Exam 03/19/2022 3/19/2021    Pneumococcal Vaccines (Age 65+) (2 of 2 - PPSV23) 06/15/2022 6/15/2017    DEXA SCAN 11/27/2022 11/27/2019    Low Dose Statin 01/03/2023 1/3/2022    TETANUS VACCINE 05/27/2029 5/27/2019 (ClinicallyNA)    Override on 5/27/2019: Not Clinically Appropriate        Orders Placed This Encounter   Procedures    Ambulatory referral/consult to Outpatient Case Management     The following information is provided to all patients.  This information is to help you find resources for any of the problems found today that may be affecting your health:                Living healthy guide: www.[facility].[place].gov      Understanding Diabetes: www.diabetes.org      Eating healthy: www.cdc.gov/healthyweight      CDC home safety checklist: www.cdc.gov/steadi/patient.html      Agency on Aging: www.goea.[place].gov      Alcoholics anonymous (AA): www.aa.org      Physical Activity: www.prashant.nih.gov/gi1oafe      Tobacco use: www.quitwithusla.org

## 2022-01-28 ENCOUNTER — LAB VISIT (OUTPATIENT)
Dept: LAB | Facility: HOSPITAL | Age: 67
End: 2022-01-28
Attending: INTERNAL MEDICINE
Payer: MEDICARE

## 2022-01-28 DIAGNOSIS — R80.9 CONTROLLED TYPE 2 DIABETES MELLITUS WITH MICROALBUMINURIA, WITHOUT LONG-TERM CURRENT USE OF INSULIN: Chronic | ICD-10-CM

## 2022-01-28 DIAGNOSIS — E11.59 HYPERTENSION ASSOCIATED WITH DIABETES: ICD-10-CM

## 2022-01-28 DIAGNOSIS — I10 HYPERTENSION GOAL BP (BLOOD PRESSURE) < 130/80: Chronic | ICD-10-CM

## 2022-01-28 DIAGNOSIS — E78.5 HYPERLIPIDEMIA LDL GOAL <70: ICD-10-CM

## 2022-01-28 DIAGNOSIS — E11.29 CONTROLLED TYPE 2 DIABETES MELLITUS WITH MICROALBUMINURIA, WITHOUT LONG-TERM CURRENT USE OF INSULIN: Chronic | ICD-10-CM

## 2022-01-28 DIAGNOSIS — I15.2 HYPERTENSION ASSOCIATED WITH DIABETES: ICD-10-CM

## 2022-01-28 LAB
ALBUMIN SERPL BCP-MCNC: 3.5 G/DL (ref 3.5–5.2)
ALP SERPL-CCNC: 90 U/L (ref 55–135)
ALT SERPL W/O P-5'-P-CCNC: 6 U/L (ref 10–44)
ANION GAP SERPL CALC-SCNC: 13 MMOL/L (ref 8–16)
AST SERPL-CCNC: 13 U/L (ref 10–40)
BILIRUB SERPL-MCNC: 0.3 MG/DL (ref 0.1–1)
BUN SERPL-MCNC: 22 MG/DL (ref 8–23)
CALCIUM SERPL-MCNC: 9.9 MG/DL (ref 8.7–10.5)
CHLORIDE SERPL-SCNC: 103 MMOL/L (ref 95–110)
CHOLEST SERPL-MCNC: 131 MG/DL (ref 120–199)
CHOLEST/HDLC SERPL: 2.7 {RATIO} (ref 2–5)
CO2 SERPL-SCNC: 24 MMOL/L (ref 23–29)
CREAT SERPL-MCNC: 1.2 MG/DL (ref 0.5–1.4)
EST. GFR  (AFRICAN AMERICAN): 53.7 ML/MIN/1.73 M^2
EST. GFR  (NON AFRICAN AMERICAN): 46.5 ML/MIN/1.73 M^2
ESTIMATED AVG GLUCOSE: 114 MG/DL (ref 68–131)
GLUCOSE SERPL-MCNC: 91 MG/DL (ref 70–110)
HBA1C MFR BLD: 5.6 % (ref 4–5.6)
HDLC SERPL-MCNC: 49 MG/DL (ref 40–75)
HDLC SERPL: 37.4 % (ref 20–50)
LDLC SERPL CALC-MCNC: 70.8 MG/DL (ref 63–159)
NONHDLC SERPL-MCNC: 82 MG/DL
POTASSIUM SERPL-SCNC: 4 MMOL/L (ref 3.5–5.1)
PROT SERPL-MCNC: 8.3 G/DL (ref 6–8.4)
SODIUM SERPL-SCNC: 140 MMOL/L (ref 136–145)
TRIGL SERPL-MCNC: 56 MG/DL (ref 30–150)

## 2022-01-28 PROCEDURE — 36415 COLL VENOUS BLD VENIPUNCTURE: CPT | Performed by: INTERNAL MEDICINE

## 2022-01-28 PROCEDURE — 80061 LIPID PANEL: CPT | Performed by: INTERNAL MEDICINE

## 2022-01-28 PROCEDURE — 80053 COMPREHEN METABOLIC PANEL: CPT | Performed by: INTERNAL MEDICINE

## 2022-01-28 PROCEDURE — 83036 HEMOGLOBIN GLYCOSYLATED A1C: CPT | Performed by: INTERNAL MEDICINE

## 2022-02-02 ENCOUNTER — TELEPHONE (OUTPATIENT)
Dept: INTERNAL MEDICINE | Facility: CLINIC | Age: 67
End: 2022-02-02
Payer: MEDICARE

## 2022-02-02 DIAGNOSIS — N28.9 RENAL INSUFFICIENCY: Primary | ICD-10-CM

## 2022-02-07 ENCOUNTER — OUTPATIENT CASE MANAGEMENT (OUTPATIENT)
Dept: ADMINISTRATIVE | Facility: OTHER | Age: 67
End: 2022-02-07
Payer: MEDICARE

## 2022-02-07 ENCOUNTER — PATIENT OUTREACH (OUTPATIENT)
Dept: ADMINISTRATIVE | Facility: OTHER | Age: 67
End: 2022-02-07
Payer: MEDICARE

## 2022-02-07 NOTE — PROGRESS NOTES
Health Maintenance Due   Topic Date Due    Colorectal Cancer Screening  07/15/2016    Shingles Vaccine (2 of 3) 12/20/2017    Mammogram  08/27/2020    Eye Exam  06/08/2021    COVID-19 Vaccine (3 - Booster for Pfizer series) 11/14/2021    Foot Exam  03/19/2022     Updates were requested from care everywhere.  Chart was reviewed for overdue Proactive [facility] Encounters (MARIO) topics (CRS, Breast Cancer Screening, Eye exam)  Health Maintenance has been updated.  LINKS immunization registry triggered.  Immunizations were reconciled.

## 2022-02-07 NOTE — LETTER
February 8, 2022    Carisa Curry  [address] [place] [address]             [facility] [address] [place] [address] Dear Ms. Curry:    I am writing from the Outpatient Complex Care Management Department at [facility].  I received a referral from Heidy Bradley NP to contact you or your caregiver regarding any needs you may have. I have attempted to contact you or your cargeiver by phone two times unsuccessfully.  Please contact the Outpatient Complex Care Management Department at [phone] if you would like to discuss your needs.      Sincerely,         Hiwot Esquivel, LISAW

## 2022-02-08 NOTE — PROGRESS NOTES
This LCSW attempted to reach patient/caregiver to provide resource and left msg requesting a return call.  Letter with contact information was sent via US mail to patient/caregiver.  Referral source notified.

## 2022-03-14 ENCOUNTER — TELEPHONE (OUTPATIENT)
Dept: PSYCHIATRY | Facility: CLINIC | Age: 67
End: 2022-03-14
Payer: MEDICARE

## 2022-03-14 NOTE — TELEPHONE ENCOUNTER
Pt was contacted @2:21----- Message from Berenice Pratt sent at 3/14/2022  1:00 PM CDT -----  Pt would like to reschedule appt.   Please call back at [phone].   [place]

## 2022-04-07 NOTE — DISCHARGE INSTRUCTIONS

## 2022-06-02 ENCOUNTER — HOSPITAL ENCOUNTER (EMERGENCY)
Facility: HOSPITAL | Age: 67
Discharge: HOME | End: 2022-06-02
Attending: EMERGENCY MEDICINE
Payer: MEDICARE

## 2022-06-02 VITALS
HEART RATE: 76 BPM | OXYGEN SATURATION: 98 % | WEIGHT: 209.75 LBS | BODY MASS INDEX: 36.01 KG/M2 | RESPIRATION RATE: 19 BRPM | TEMPERATURE: 100 F | DIASTOLIC BLOOD PRESSURE: 79 MMHG | SYSTOLIC BLOOD PRESSURE: 159 MMHG

## 2022-06-02 DIAGNOSIS — R60.0 PERIORBITAL EDEMA OF RIGHT EYE: Primary | ICD-10-CM

## 2022-06-02 DIAGNOSIS — R60.0 BILATERAL LEG EDEMA: ICD-10-CM

## 2022-06-02 LAB
ALBUMIN SERPL BCP-MCNC: 3.7 G/DL (ref 3.5–5.2)
ALP SERPL-CCNC: 84 U/L (ref 55–135)
ALT SERPL W/O P-5'-P-CCNC: 6 U/L (ref 10–44)
ANION GAP SERPL CALC-SCNC: 11 MMOL/L (ref 8–16)
AST SERPL-CCNC: 12 U/L (ref 10–40)
BASOPHILS # BLD AUTO: 0.05 K/UL (ref 0–0.2)
BASOPHILS NFR BLD: 0.6 % (ref 0–1.9)
BILIRUB SERPL-MCNC: 0.3 MG/DL (ref 0.1–1)
BNP SERPL-MCNC: 78 PG/ML (ref 0–99)
BUN SERPL-MCNC: 20 MG/DL (ref 8–23)
CALCIUM SERPL-MCNC: 9.5 MG/DL (ref 8.7–10.5)
CHLORIDE SERPL-SCNC: 103 MMOL/L (ref 95–110)
CO2 SERPL-SCNC: 27 MMOL/L (ref 23–29)
CREAT SERPL-MCNC: 1.2 MG/DL (ref 0.5–1.4)
DIFFERENTIAL METHOD: ABNORMAL
EOSINOPHIL # BLD AUTO: 0.3 K/UL (ref 0–0.5)
EOSINOPHIL NFR BLD: 2.9 % (ref 0–8)
ERYTHROCYTE [DISTWIDTH] IN BLOOD BY AUTOMATED COUNT: 14.2 % (ref 11.5–14.5)
EST. GFR  (AFRICAN AMERICAN): 54 ML/MIN/1.73 M^2
EST. GFR  (NON AFRICAN AMERICAN): 47 ML/MIN/1.73 M^2
GLUCOSE SERPL-MCNC: 170 MG/DL (ref 70–110)
HCT VFR BLD AUTO: 34.1 % (ref 37–48.5)
HGB BLD-MCNC: 11.1 G/DL (ref 12–16)
IMM GRANULOCYTES # BLD AUTO: 0.03 K/UL (ref 0–0.04)
IMM GRANULOCYTES NFR BLD AUTO: 0.3 % (ref 0–0.5)
LYMPHOCYTES # BLD AUTO: 3.3 K/UL (ref 1–4.8)
LYMPHOCYTES NFR BLD: 35.8 % (ref 18–48)
MCH RBC QN AUTO: 28 PG (ref 27–31)
MCHC RBC AUTO-ENTMCNC: 32.6 G/DL (ref 32–36)
MCV RBC AUTO: 86 FL (ref 82–98)
MONOCYTES # BLD AUTO: 0.7 K/UL (ref 0.3–1)
MONOCYTES NFR BLD: 7.9 % (ref 4–15)
NEUTROPHILS # BLD AUTO: 4.8 K/UL (ref 1.8–7.7)
NEUTROPHILS NFR BLD: 52.5 % (ref 38–73)
NRBC BLD-RTO: 0 /100 WBC
PLATELET # BLD AUTO: 236 K/UL (ref 150–450)
PMV BLD AUTO: 11.1 FL (ref 9.2–12.9)
POTASSIUM SERPL-SCNC: 4 MMOL/L (ref 3.5–5.1)
PROT SERPL-MCNC: 8 G/DL (ref 6–8.4)
RBC # BLD AUTO: 3.96 M/UL (ref 4–5.4)
SODIUM SERPL-SCNC: 141 MMOL/L (ref 136–145)
WBC # BLD AUTO: 9.09 K/UL (ref 3.9–12.7)

## 2022-06-02 PROCEDURE — 80053 COMPREHEN METABOLIC PANEL: CPT | Performed by: NURSE PRACTITIONER

## 2022-06-02 PROCEDURE — 85025 COMPLETE CBC W/AUTO DIFF WBC: CPT | Performed by: NURSE PRACTITIONER

## 2022-06-02 PROCEDURE — 83880 ASSAY OF NATRIURETIC PEPTIDE: CPT | Performed by: NURSE PRACTITIONER

## 2022-06-02 PROCEDURE — 99284 EMERGENCY DEPT VISIT MOD MDM: CPT | Mod: 25

## 2022-06-02 RX ORDER — CLONIDINE HYDROCHLORIDE 0.2 MG/1
0.2 TABLET ORAL
Status: DISCONTINUED | OUTPATIENT
Start: 2022-06-02 | End: 2022-06-03 | Stop reason: HOSPADM

## 2022-06-03 NOTE — ED NOTES
Patient was evaluated, treated, and discharged by assigned provider (SHOAIB Page) without nursing assistance.

## 2022-06-03 NOTE — ED PROVIDER NOTES
Encounter Date: 6/2/2022       History     Chief Complaint   Patient presents with    Facial Swelling     Pt c/o of eye swelling that has worsened since last night. NKA.      Patient complains of right periorbital swelling for 1 day.  States this has happened in the past and was seen by their doctor but does not know what she was diagnosed with or if there were any medications or if it resolved on its own.  Denies any eye pain or vision changes denies any fever denies any shortness of breath or chest pain or bilateral lower extremity swelling.        Review of patient's allergies indicates:  No Known Allergies  Past Medical History:   Diagnosis Date    Arthritis     CHF (congestive heart failure)     Diabetes mellitus, type 2     Diabetic retinopathy     Hyperlipidemia     Hypertension     Schizophrenia     Seizures     reported per pt and sister, nothing since childhood     Past Surgical History:   Procedure Laterality Date    EYE SURGERY      INJECTION OF ANESTHETIC AGENT AROUND NERVE Bilateral 12/9/2019    Procedure: Bilateral Genicular nerve block (DIAGNOSTIC, NO STEROID) with RN IV sedation;  Surgeon: Butch Chao MD;  Location: [facility] PAIN T;  Service: Pain Management;  Laterality: Bilateral;    RADIOFREQUENCY THERMOCOAGULATION Right 12/23/2019    Procedure: Right Genicular Nerve RFA (COOLED) with RN IV sedation;  Surgeon: Butch Chao MD;  Location: [facility] PAIN MGT;  Service: Pain Management;  Laterality: Right;    RADIOFREQUENCY THERMOCOAGULATION Left 1/6/2020    Procedure: Left Genicular Nerve RFA (COOLED) with RN IV sedation;  Surgeon: Butch Chao MD;  Location: [facility] PAIN MGT;  Service: Pain Management;  Laterality: Left;    SPINE SURGERY       Family History   Problem Relation Age of Onset    Kidney disease Mother     Heart failure Mother     Cataracts Mother     Hypertension Mother     Heart disease Mother     Stroke Mother     Cancer Father         brain    Diabetes  Father     Diabetes Maternal Aunt     Hypertension Maternal Aunt     Diabetes Paternal Aunt     Hypertension Paternal Aunt     Heart disease Paternal Aunt     Diabetes Paternal Uncle     Heart disease Paternal Uncle      Social History     Tobacco Use    Smoking status: Never Smoker    Smokeless tobacco: Never Used   Substance Use Topics    Alcohol use: No     Alcohol/week: 0.0 standard drinks    Drug use: No     Review of Systems   Constitutional: Negative for fever.   HENT: Negative for sore throat.    Respiratory: Negative for shortness of breath.    Cardiovascular: Negative for chest pain.   Gastrointestinal: Negative for nausea.   Genitourinary: Negative for dysuria.   Musculoskeletal: Negative for back pain.   Skin: Negative for rash.   Neurological: Negative for weakness.   Hematological: Does not bruise/bleed easily.       Physical Exam     Initial Vitals [06/02/22 1929]   BP Pulse Resp Temp SpO2   (!) 222/76 79 19 99.6 °F (37.6 °C) 99 %      MAP       --         Physical Exam    Nursing note and vitals reviewed.  Constitutional: She appears well-developed and well-nourished. She is not diaphoretic. She is active.  Non-toxic appearance. No distress.   HENT:   Head: Normocephalic and atraumatic.   Right periorbital edema.  No erythema no tenderness with extraocular motor movements of the eye.  No conjunctiva inflammation no sclerae are erythema.  Swelling is not tender to palpation.   Eyes: Conjunctivae are normal. Right eye exhibits no discharge. Left eye exhibits no discharge. No scleral icterus.   Neck:   Normal range of motion.  Cardiovascular: Normal rate, regular rhythm and intact distal pulses.   No murmur heard.  Pulmonary/Chest: Breath sounds normal. No respiratory distress. She has no wheezes.   Abdominal: She exhibits no distension.   Musculoskeletal:         General: No tenderness. Normal range of motion.      Cervical back: Normal range of motion.     Neurological: She is alert and  oriented to person, place, and time. No cranial nerve deficit. GCS score is 15. GCS eye subscore is 4. GCS verbal subscore is 5. GCS motor subscore is 6.   Skin: Skin is warm and dry. Capillary refill takes less than 2 seconds. No rash noted.   Psychiatric: She has a normal mood and affect. Her behavior is normal. Judgment and thought content normal.         ED Course   Procedures  Labs Reviewed   CBC W/ AUTO DIFFERENTIAL - Abnormal; Notable for the following components:       Result Value    RBC 3.96 (*)     Hemoglobin 11.1 (*)     Hematocrit 34.1 (*)     All other components within normal limits   COMPREHENSIVE METABOLIC PANEL - Abnormal; Notable for the following components:    Glucose 170 (*)     ALT 6 (*)     eGFR if  54 (*)     eGFR if non  47 (*)     All other components within normal limits   B-TYPE NATRIURETIC PEPTIDE          Imaging Results          X-Ray Chest PA And Lateral (Final result)  Result time 06/02/22 21:50:38    Final result by Talha Barnes MD (06/02/22 21:50:38)                 Impression:      No acute abnormality.      Electronically signed by: Talha Barnes  Date:    06/02/2022  Time:    21:50             Narrative:    EXAMINATION:  XR CHEST PA AND LATERAL    CLINICAL HISTORY:  Localized edema    TECHNIQUE:  PA and lateral views of the chest were performed.    COMPARISON:  None    FINDINGS:  The lungs are clear, with normal appearance of pulmonary vasculature and no pleural effusion or pneumothorax.    The cardiac silhouette is normal in size. The hilar and mediastinal contours are unremarkable.    Degenerative change of the shoulders.                                 Medications   cloNIDine tablet 0.2 mg (0 mg Oral Hold 6/2/22 6421)     Medical Decision Making:   Does not take Ace inhibitors denies this swelling occurred after any type of eye irritation or ingestion of any different foods or medicines.  Explained to the patient the importance of  following up with her doctor and returning to the ED for any new or worsening symptoms.  Family member present states that they will pay close attention.  Patient still needs to take her blood pressure medicine tonight as well.                      Clinical Impression:   Final diagnoses:  [R60.0] Bilateral leg edema  [R60.0] Periorbital edema of right eye (Primary)          ED Disposition Condition    Discharge Stable        ED Prescriptions     None        Follow-up Information     Follow up With Specialties Details Why Contact Info    Guadalupe Guzman,  Internal Medicine Schedule an appointment as soon as possible for a visit in 2 days If symptoms worsen [id number] [place] [id number]  [phone]             Camilo Horn, SHOAIB  06/02/22 8367

## 2022-06-17 ENCOUNTER — OFFICE VISIT (OUTPATIENT)
Dept: PSYCHIATRY | Facility: CLINIC | Age: 67
End: 2022-06-17
Payer: MEDICARE

## 2022-06-17 VITALS — WEIGHT: 202.63 LBS | BODY MASS INDEX: 34.59 KG/M2 | HEIGHT: 64 IN

## 2022-06-17 DIAGNOSIS — F20.9 SCHIZOPHRENIA, UNSPECIFIED TYPE: Primary | ICD-10-CM

## 2022-06-17 PROCEDURE — 99214 OFFICE O/P EST MOD 30 MIN: CPT | Mod: S$PBB,,, | Performed by: PSYCHIATRY & NEUROLOGY

## 2022-06-17 PROCEDURE — 99999 PR PBB SHADOW E&M-EST. PATIENT-LVL II: ICD-10-PCS | Mod: PBBFAC,,, | Performed by: PSYCHIATRY & NEUROLOGY

## 2022-06-17 PROCEDURE — 99214 PR OFFICE/OUTPT VISIT, EST, LEVL IV, 30-39 MIN: ICD-10-PCS | Mod: S$PBB,,, | Performed by: PSYCHIATRY & NEUROLOGY

## 2022-06-17 PROCEDURE — 99212 OFFICE O/P EST SF 10 MIN: CPT | Mod: PBBFAC | Performed by: PSYCHIATRY & NEUROLOGY

## 2022-06-17 PROCEDURE — 99999 PR PBB SHADOW E&M-EST. PATIENT-LVL II: CPT | Mod: PBBFAC,,, | Performed by: PSYCHIATRY & NEUROLOGY

## 2022-06-17 RX ORDER — ARIPIPRAZOLE 10 MG/1
10 TABLET ORAL DAILY
Qty: 30 TABLET | Refills: 5 | Status: SHIPPED | OUTPATIENT
Start: 2022-06-17 | End: 2023-02-01

## 2022-06-17 RX ORDER — BENZTROPINE MESYLATE 1 MG/1
TABLET ORAL
Qty: 60 TABLET | Refills: 5 | Status: SHIPPED | OUTPATIENT
Start: 2022-06-17
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
## 2022-06-22 ENCOUNTER — PATIENT OUTREACH (OUTPATIENT)
Dept: ADMINISTRATIVE | Facility: HOSPITAL | Age: 67
End: 2022-06-22
Payer: MEDICARE

## 2022-06-22 NOTE — PROGRESS NOTES
Received call from Ms [id number] to schedule follow up and for leg pain.  Appt scheduled, 6/29/22, prior to eye dr mariot. Also about due for A1C and would like to do same day while in clinic.

## 2022-06-29 ENCOUNTER — OFFICE VISIT (OUTPATIENT)
Dept: INTERNAL MEDICINE | Facility: CLINIC | Age: 67
End: 2022-06-29
Payer: MEDICARE

## 2022-06-29 ENCOUNTER — OFFICE VISIT (OUTPATIENT)
Dept: OPHTHALMOLOGY | Facility: CLINIC | Age: 67
End: 2022-06-29
Payer: MEDICARE

## 2022-06-29 ENCOUNTER — LAB VISIT (OUTPATIENT)
Dept: LAB | Facility: HOSPITAL | Age: 67
End: 2022-06-29
Attending: PHYSICIAN ASSISTANT
Payer: MEDICARE

## 2022-06-29 VITALS
OXYGEN SATURATION: 99 % | SYSTOLIC BLOOD PRESSURE: 122 MMHG | HEART RATE: 61 BPM | WEIGHT: 207.69 LBS | TEMPERATURE: 98 F | DIASTOLIC BLOOD PRESSURE: 68 MMHG | HEIGHT: 64 IN | BODY MASS INDEX: 35.46 KG/M2

## 2022-06-29 DIAGNOSIS — R80.9 CONTROLLED TYPE 2 DIABETES MELLITUS WITH MICROALBUMINURIA, WITHOUT LONG-TERM CURRENT USE OF INSULIN: Chronic | ICD-10-CM

## 2022-06-29 DIAGNOSIS — E78.5 HYPERLIPIDEMIA LDL GOAL <70: Chronic | ICD-10-CM

## 2022-06-29 DIAGNOSIS — M17.12 PRIMARY OSTEOARTHRITIS OF LEFT KNEE: ICD-10-CM

## 2022-06-29 DIAGNOSIS — E11.29 CONTROLLED TYPE 2 DIABETES MELLITUS WITH MICROALBUMINURIA, WITHOUT LONG-TERM CURRENT USE OF INSULIN: Chronic | ICD-10-CM

## 2022-06-29 DIAGNOSIS — F20.9 SCHIZOPHRENIA, UNSPECIFIED TYPE: ICD-10-CM

## 2022-06-29 DIAGNOSIS — Z23 NEED FOR VACCINATION FOR STREP PNEUMONIAE: ICD-10-CM

## 2022-06-29 DIAGNOSIS — H25.013 CORTICAL AGE-RELATED CATARACT, BILATERAL: ICD-10-CM

## 2022-06-29 DIAGNOSIS — I10 HYPERTENSION GOAL BP (BLOOD PRESSURE) < 130/80: Primary | Chronic | ICD-10-CM

## 2022-06-29 DIAGNOSIS — M17.11 PRIMARY OSTEOARTHRITIS OF RIGHT KNEE: ICD-10-CM

## 2022-06-29 DIAGNOSIS — E11.9 DIABETES MELLITUS TYPE 2 WITHOUT RETINOPATHY: Primary | ICD-10-CM

## 2022-06-29 DIAGNOSIS — I10 HYPERTENSION GOAL BP (BLOOD PRESSURE) < 130/80: Chronic | ICD-10-CM

## 2022-06-29 DIAGNOSIS — Z12.31 OTHER SCREENING MAMMOGRAM: ICD-10-CM

## 2022-06-29 DIAGNOSIS — H52.7 REFRACTIVE ERRORS: ICD-10-CM

## 2022-06-29 LAB
ALBUMIN SERPL BCP-MCNC: 3.6 G/DL (ref 3.5–5.2)
ALP SERPL-CCNC: 84 U/L (ref 55–135)
ALT SERPL W/O P-5'-P-CCNC: 7 U/L (ref 10–44)
ANION GAP SERPL CALC-SCNC: 9 MMOL/L (ref 8–16)
AST SERPL-CCNC: 11 U/L (ref 10–40)
BASOPHILS # BLD AUTO: 0.05 K/UL (ref 0–0.2)
BASOPHILS NFR BLD: 0.6 % (ref 0–1.9)
BILIRUB SERPL-MCNC: 0.4 MG/DL (ref 0.1–1)
BUN SERPL-MCNC: 21 MG/DL (ref 8–23)
CALCIUM SERPL-MCNC: 9.5 MG/DL (ref 8.7–10.5)
CHLORIDE SERPL-SCNC: 105 MMOL/L (ref 95–110)
CHOLEST SERPL-MCNC: 138 MG/DL (ref 120–199)
CHOLEST/HDLC SERPL: 2.5 {RATIO} (ref 2–5)
CO2 SERPL-SCNC: 27 MMOL/L (ref 23–29)
CREAT SERPL-MCNC: 1.4 MG/DL (ref 0.5–1.4)
DIFFERENTIAL METHOD: ABNORMAL
EOSINOPHIL # BLD AUTO: 0.1 K/UL (ref 0–0.5)
EOSINOPHIL NFR BLD: 1.2 % (ref 0–8)
ERYTHROCYTE [DISTWIDTH] IN BLOOD BY AUTOMATED COUNT: 14.1 % (ref 11.5–14.5)
EST. GFR  (AFRICAN AMERICAN): 44.5 ML/MIN/1.73 M^2
EST. GFR  (NON AFRICAN AMERICAN): 38.6 ML/MIN/1.73 M^2
ESTIMATED AVG GLUCOSE: 111 MG/DL (ref 68–131)
GLUCOSE SERPL-MCNC: 86 MG/DL (ref 70–110)
HBA1C MFR BLD: 5.5 % (ref 4–5.6)
HCT VFR BLD AUTO: 33.8 % (ref 37–48.5)
HDLC SERPL-MCNC: 56 MG/DL (ref 40–75)
HDLC SERPL: 40.6 % (ref 20–50)
HGB BLD-MCNC: 10.7 G/DL (ref 12–16)
IMM GRANULOCYTES # BLD AUTO: 0.02 K/UL (ref 0–0.04)
IMM GRANULOCYTES NFR BLD AUTO: 0.2 % (ref 0–0.5)
LDLC SERPL CALC-MCNC: 67.6 MG/DL (ref 63–159)
LYMPHOCYTES # BLD AUTO: 3 K/UL (ref 1–4.8)
LYMPHOCYTES NFR BLD: 32.7 % (ref 18–48)
MCH RBC QN AUTO: 28.5 PG (ref 27–31)
MCHC RBC AUTO-ENTMCNC: 31.7 G/DL (ref 32–36)
MCV RBC AUTO: 90 FL (ref 82–98)
MONOCYTES # BLD AUTO: 0.7 K/UL (ref 0.3–1)
MONOCYTES NFR BLD: 8 % (ref 4–15)
NEUTROPHILS # BLD AUTO: 5.2 K/UL (ref 1.8–7.7)
NEUTROPHILS NFR BLD: 57.3 % (ref 38–73)
NONHDLC SERPL-MCNC: 82 MG/DL
NRBC BLD-RTO: 0 /100 WBC
PLATELET # BLD AUTO: 260 K/UL (ref 150–450)
PMV BLD AUTO: 11.9 FL (ref 9.2–12.9)
POTASSIUM SERPL-SCNC: 4.4 MMOL/L (ref 3.5–5.1)
PROT SERPL-MCNC: 7.5 G/DL (ref 6–8.4)
RBC # BLD AUTO: 3.76 M/UL (ref 4–5.4)
SODIUM SERPL-SCNC: 141 MMOL/L (ref 136–145)
TRIGL SERPL-MCNC: 72 MG/DL (ref 30–150)
WBC # BLD AUTO: 9.02 K/UL (ref 3.9–12.7)

## 2022-06-29 PROCEDURE — 99214 PR OFFICE/OUTPT VISIT, EST, LEVL IV, 30-39 MIN: ICD-10-PCS | Mod: S$PBB,,, | Performed by: PHYSICIAN ASSISTANT

## 2022-06-29 PROCEDURE — 36415 COLL VENOUS BLD VENIPUNCTURE: CPT | Performed by: PHYSICIAN ASSISTANT

## 2022-06-29 PROCEDURE — 99215 OFFICE O/P EST HI 40 MIN: CPT | Mod: PBBFAC,25 | Performed by: PHYSICIAN ASSISTANT

## 2022-06-29 PROCEDURE — 90677 PCV20 VACCINE IM: CPT | Mod: PBBFAC

## 2022-06-29 PROCEDURE — 99999 PR PBB SHADOW E&M-EST. PATIENT-LVL II: CPT | Mod: PBBFAC,,, | Performed by: OPTOMETRIST

## 2022-06-29 PROCEDURE — 80061 LIPID PANEL: CPT | Performed by: PHYSICIAN ASSISTANT

## 2022-06-29 PROCEDURE — 92015 DETERMINE REFRACTIVE STATE: CPT | Mod: ,,, | Performed by: OPTOMETRIST

## 2022-06-29 PROCEDURE — 92015 PR REFRACTION: ICD-10-PCS | Mod: ,,, | Performed by: OPTOMETRIST

## 2022-06-29 PROCEDURE — 92014 COMPRE OPH EXAM EST PT 1/>: CPT | Mod: S$PBB,,, | Performed by: OPTOMETRIST

## 2022-06-29 PROCEDURE — 99999 PR PBB SHADOW E&M-EST. PATIENT-LVL V: CPT | Mod: PBBFAC,,, | Performed by: PHYSICIAN ASSISTANT

## 2022-06-29 PROCEDURE — 83036 HEMOGLOBIN GLYCOSYLATED A1C: CPT | Performed by: PHYSICIAN ASSISTANT

## 2022-06-29 PROCEDURE — 80053 COMPREHEN METABOLIC PANEL: CPT | Performed by: PHYSICIAN ASSISTANT

## 2022-06-29 PROCEDURE — 99999 PR PBB SHADOW E&M-EST. PATIENT-LVL II: ICD-10-PCS | Mod: PBBFAC,,, | Performed by: OPTOMETRIST

## 2022-06-29 PROCEDURE — 99999 PR PBB SHADOW E&M-EST. PATIENT-LVL V: ICD-10-PCS | Mod: PBBFAC,,, | Performed by: PHYSICIAN ASSISTANT

## 2022-06-29 PROCEDURE — 99214 OFFICE O/P EST MOD 30 MIN: CPT | Mod: S$PBB,,, | Performed by: PHYSICIAN ASSISTANT

## 2022-06-29 PROCEDURE — 99212 OFFICE O/P EST SF 10 MIN: CPT | Mod: PBBFAC,27,25 | Performed by: OPTOMETRIST

## 2022-06-29 PROCEDURE — 92014 PR EYE EXAM, EST PATIENT,COMPREHESV: ICD-10-PCS | Mod: S$PBB,,, | Performed by: OPTOMETRIST

## 2022-06-29 PROCEDURE — 85025 COMPLETE CBC W/AUTO DIFF WBC: CPT | Performed by: PHYSICIAN ASSISTANT

## 2022-06-29 RX ORDER — DICLOFENAC SODIUM 10 MG/G
2 GEL TOPICAL 4 TIMES DAILY
Qty: 100 G | Refills: 0 | Status: SHIPPED | OUTPATIENT
Start: 2022-06-29 | End: 2023-06-29

## 2022-06-29 NOTE — PROGRESS NOTES
HPI     Diabetic Eye Exam      Additional comments: Lab Results       Component                Value               Date                       HGBA1C                   5.6                 01/28/2022                          Comments     Pt was seen in ER 06/02/2022 for periorbital edema OD.  It has improved,   but there is still pain (ache) and tearing.          Last edited by Viv Gray MA on 6/29/2022  2:38 PM. (History)            Assessment /Plan     For exam results, see Encounter Report.    Diabetes mellitus type 2 without retinopathy    Cortical age-related cataract, bilateral    Refractive errors      No Background Diabetic Retinopathy    Moderate cataracts OU, not surgical    Dispense Final Rx for glasses.  RTC 1 year  Discussed above and answered questions.

## 2022-06-29 NOTE — PROGRESS NOTES
"Subjective:      Patient ID: Carisa Curry is a 67 y.o. female.    Chief Complaint: Annual Exam    Patient is new to me, being seen today for annual exam.     HTN- losartan 100mg, toprol 25mg, amlodipine 10mg, HCTZ 25mg   HLD- on statin therapy   DM- A1c 5.6%, metformin 1000mg bid     Due for labs     Last visit Jan 2022 with Dr. Guzman.    Review of Systems   Constitutional: Negative for chills, diaphoresis and fever.   HENT: Negative for congestion, rhinorrhea and sore throat.    Respiratory: Negative for cough, shortness of breath and wheezing.    Cardiovascular: Negative for chest pain and leg swelling.   Gastrointestinal: Negative for abdominal pain, constipation, diarrhea, nausea and vomiting.   Musculoskeletal: Positive for arthralgias.   Skin: Negative for rash.   Neurological: Negative for dizziness, light-headedness and headaches.       Objective:   /68   Pulse 61   Temp 97.8 °F (36.6 °C)   Ht 5' 4" (1.626 m)   Wt 94.2 kg (207 lb 10.8 oz)   SpO2 99%   BMI 35.65 kg/m²   Physical Exam  Constitutional:       General: She is not in acute distress.     Appearance: Normal appearance. She is well-developed. She is not ill-appearing.   HENT:      Head: Normocephalic and atraumatic.   Cardiovascular:      Rate and Rhythm: Normal rate and regular rhythm.      Heart sounds: Normal heart sounds. No murmur heard.  Pulmonary:      Effort: Pulmonary effort is normal. No respiratory distress.      Breath sounds: Normal breath sounds. No decreased breath sounds.   Musculoskeletal:      Right lower leg: No edema.      Left lower leg: No edema.   Skin:     General: Skin is warm and dry.      Findings: No rash.   Psychiatric:         Speech: Speech normal.         Behavior: Behavior normal.         Thought Content: Thought content normal.       Assessment:      1. Hypertension goal BP (blood pressure) < 130/80    2. Controlled type 2 diabetes mellitus with microalbuminuria, without long-term current use of " insulin    3. Hyperlipidemia LDL goal <70    4. Schizophrenia, unspecified type    5. Primary osteoarthritis of left knee    6. Primary osteoarthritis of right knee    7. Need for vaccination for Strep pneumoniae       Plan:   Hypertension goal BP (blood pressure) < 130/80  -     CBC Auto Differential; Future; Expected date: 06/29/2022  -     Comprehensive Metabolic Panel; Future; Expected date: 06/29/2022    Controlled type 2 diabetes mellitus with microalbuminuria, without long-term current use of insulin  -     Hemoglobin A1C; Future; Expected date: 06/29/2022    Hyperlipidemia LDL goal <70  -     Lipid Panel; Future; Expected date: 06/29/2022    Schizophrenia, unspecified type   Followed by Psych    Primary osteoarthritis of left knee  -     diclofenac sodium (VOLTAREN) 1 % Gel; Apply 2 g topically 4 (four) times daily. for 10 days  Dispense: 100 g; Refill: 0  -     Ambulatory referral/consult to Orthopedics; Future; Expected date: 07/06/2022    Primary osteoarthritis of right knee  -     diclofenac sodium (VOLTAREN) 1 % Gel; Apply 2 g topically 4 (four) times daily. for 10 days  Dispense: 100 g; Refill: 0  -     Ambulatory referral/consult to Orthopedics; Future; Expected date: 07/06/2022    Need for vaccination for Strep pneumoniae  -     (In Office Administered) Pneumococcal Conjugate Vaccine (20 Valent) (IM)      Reminder to do fitkit     Discussed needed vaccines     Eye exam scheduled for later today     Fasting labs to be completed     6mth f/u or sooner if needed

## 2022-06-30 ENCOUNTER — TELEPHONE (OUTPATIENT)
Dept: INTERNAL MEDICINE | Facility: CLINIC | Age: 67
End: 2022-06-30
Payer: MEDICARE

## 2022-06-30 DIAGNOSIS — D50.9 MICROCYTIC ANEMIA: Primary | ICD-10-CM

## 2022-06-30 DIAGNOSIS — N28.9 FUNCTION KIDNEY DECREASED: ICD-10-CM

## 2022-06-30 NOTE — TELEPHONE ENCOUNTER
Called pt to go over lab results, pt was verbally understanding, Pt asked if she should take iron pills I said to hold off on that until Mrs. Alina Dior can review her iron labs and determine if that's needed. Labs are scheduled in 1.5 weeks.

## 2022-08-31 DIAGNOSIS — M17.12 PRIMARY OSTEOARTHRITIS OF LEFT KNEE: Primary | ICD-10-CM

## 2022-08-31 DIAGNOSIS — M17.11 PRIMARY OSTEOARTHRITIS OF RIGHT KNEE: ICD-10-CM

## 2022-09-12 ENCOUNTER — TELEPHONE (OUTPATIENT)
Dept: ORTHOPEDICS | Facility: CLINIC | Age: 67
End: 2022-09-12
Payer: MEDICARE

## 2022-09-12 NOTE — TELEPHONE ENCOUNTER
Spoke with pt to schedule appt from ortho referral for bilateral knee pain. Pt agreed to 10/4 appt with Dr. Powell. Informed pt to arrive 30 min prior for xrays. Pt verbalized understanding.

## 2022-12-29 ENCOUNTER — LAB VISIT (OUTPATIENT)
Dept: LAB | Facility: HOSPITAL | Age: 67
End: 2022-12-29
Attending: INTERNAL MEDICINE
Payer: MEDICARE

## 2022-12-29 ENCOUNTER — OFFICE VISIT (OUTPATIENT)
Dept: INTERNAL MEDICINE | Facility: CLINIC | Age: 67
End: 2022-12-29
Payer: MEDICARE

## 2022-12-29 VITALS
HEIGHT: 64 IN | OXYGEN SATURATION: 96 % | RESPIRATION RATE: 20 BRPM | DIASTOLIC BLOOD PRESSURE: 64 MMHG | BODY MASS INDEX: 36.4 KG/M2 | TEMPERATURE: 98 F | WEIGHT: 213.19 LBS | HEART RATE: 80 BPM | SYSTOLIC BLOOD PRESSURE: 134 MMHG

## 2022-12-29 DIAGNOSIS — E11.59 HYPERTENSION ASSOCIATED WITH DIABETES: ICD-10-CM

## 2022-12-29 DIAGNOSIS — R80.9 CONTROLLED TYPE 2 DIABETES MELLITUS WITH MICROALBUMINURIA, WITHOUT LONG-TERM CURRENT USE OF INSULIN: ICD-10-CM

## 2022-12-29 DIAGNOSIS — E87.6 DIURETIC-INDUCED HYPOKALEMIA: ICD-10-CM

## 2022-12-29 DIAGNOSIS — Z23 IMMUNIZATION DUE: ICD-10-CM

## 2022-12-29 DIAGNOSIS — E78.5 HYPERLIPIDEMIA LDL GOAL <70: ICD-10-CM

## 2022-12-29 DIAGNOSIS — I15.2 HYPERTENSION ASSOCIATED WITH DIABETES: ICD-10-CM

## 2022-12-29 DIAGNOSIS — I10 HYPERTENSION GOAL BP (BLOOD PRESSURE) < 130/80: Primary | ICD-10-CM

## 2022-12-29 DIAGNOSIS — T50.2X5A DIURETIC-INDUCED HYPOKALEMIA: ICD-10-CM

## 2022-12-29 DIAGNOSIS — M81.0 AGE-RELATED OSTEOPOROSIS WITHOUT CURRENT PATHOLOGICAL FRACTURE: ICD-10-CM

## 2022-12-29 DIAGNOSIS — E11.29 CONTROLLED TYPE 2 DIABETES MELLITUS WITH MICROALBUMINURIA, WITHOUT LONG-TERM CURRENT USE OF INSULIN: ICD-10-CM

## 2022-12-29 LAB
ALBUMIN SERPL BCP-MCNC: 3.8 G/DL (ref 3.5–5.2)
ALP SERPL-CCNC: 88 U/L (ref 55–135)
ALT SERPL W/O P-5'-P-CCNC: 6 U/L (ref 10–44)
ANION GAP SERPL CALC-SCNC: 12 MMOL/L (ref 8–16)
AST SERPL-CCNC: 12 U/L (ref 10–40)
BILIRUB SERPL-MCNC: 0.3 MG/DL (ref 0.1–1)
BUN SERPL-MCNC: 21 MG/DL (ref 8–23)
CALCIUM SERPL-MCNC: 9.6 MG/DL (ref 8.7–10.5)
CHLORIDE SERPL-SCNC: 104 MMOL/L (ref 95–110)
CO2 SERPL-SCNC: 26 MMOL/L (ref 23–29)
CREAT SERPL-MCNC: 1.1 MG/DL (ref 0.5–1.4)
EST. GFR  (NO RACE VARIABLE): 54.7 ML/MIN/1.73 M^2
ESTIMATED AVG GLUCOSE: 114 MG/DL (ref 68–131)
GLUCOSE SERPL-MCNC: 115 MG/DL (ref 70–110)
HBA1C MFR BLD: 5.6 % (ref 4–5.6)
POTASSIUM SERPL-SCNC: 3.8 MMOL/L (ref 3.5–5.1)
PROT SERPL-MCNC: 8.3 G/DL (ref 6–8.4)
SODIUM SERPL-SCNC: 142 MMOL/L (ref 136–145)

## 2022-12-29 PROCEDURE — 90694 VACC AIIV4 NO PRSRV 0.5ML IM: CPT | Mod: PBBFAC

## 2022-12-29 PROCEDURE — 99999 PR PBB SHADOW E&M-EST. PATIENT-LVL IV: ICD-10-PCS | Mod: PBBFAC,,, | Performed by: INTERNAL MEDICINE

## 2022-12-29 PROCEDURE — G0008 ADMIN INFLUENZA VIRUS VAC: HCPCS | Mod: PBBFAC

## 2022-12-29 PROCEDURE — 36415 COLL VENOUS BLD VENIPUNCTURE: CPT | Performed by: INTERNAL MEDICINE

## 2022-12-29 PROCEDURE — 99214 PR OFFICE/OUTPT VISIT, EST, LEVL IV, 30-39 MIN: ICD-10-PCS | Mod: S$PBB,,, | Performed by: INTERNAL MEDICINE

## 2022-12-29 PROCEDURE — 80053 COMPREHEN METABOLIC PANEL: CPT | Performed by: INTERNAL MEDICINE

## 2022-12-29 PROCEDURE — 99214 OFFICE O/P EST MOD 30 MIN: CPT | Mod: PBBFAC,25 | Performed by: INTERNAL MEDICINE

## 2022-12-29 PROCEDURE — 99214 OFFICE O/P EST MOD 30 MIN: CPT | Mod: S$PBB,,, | Performed by: INTERNAL MEDICINE

## 2022-12-29 PROCEDURE — 99999 PR PBB SHADOW E&M-EST. PATIENT-LVL IV: CPT | Mod: PBBFAC,,, | Performed by: INTERNAL MEDICINE

## 2022-12-29 PROCEDURE — 83036 HEMOGLOBIN GLYCOSYLATED A1C: CPT | Performed by: INTERNAL MEDICINE

## 2022-12-29 RX ORDER — AMLODIPINE BESYLATE 10 MG/1
10 TABLET ORAL DAILY
Qty: 90 TABLET | Refills: 3 | Status: SHIPPED | OUTPATIENT
Start: 2022-12-29 | End: 2023-12-31

## 2022-12-29 RX ORDER — PRAVASTATIN SODIUM 40 MG/1
40 TABLET ORAL DAILY
Qty: 90 TABLET | Refills: 3 | Status: SHIPPED | OUTPATIENT
Start: 2022-12-29 | End: 2024-03-03

## 2022-12-29 RX ORDER — METFORMIN HYDROCHLORIDE 1000 MG/1
TABLET ORAL
Qty: 180 TABLET | Refills: 3 | Status: SHIPPED | OUTPATIENT
Start: 2022-12-29 | End: 2024-01-19

## 2022-12-29 RX ORDER — HYDROCHLOROTHIAZIDE 25 MG/1
25 TABLET ORAL DAILY
Qty: 90 TABLET | Refills: 3 | Status: SHIPPED | OUTPATIENT
Start: 2022-12-29 | End: 2023-12-31

## 2022-12-29 RX ORDER — POTASSIUM CHLORIDE 750 MG/1
10 TABLET, EXTENDED RELEASE ORAL DAILY
Qty: 90 TABLET | Refills: 3 | Status: SHIPPED | OUTPATIENT
Start: 2022-12-29 | End: 2024-02-15

## 2022-12-29 RX ORDER — LOSARTAN POTASSIUM 100 MG/1
100 TABLET ORAL DAILY
Qty: 90 TABLET | Refills: 3 | Status: SHIPPED | OUTPATIENT
Start: 2022-12-29 | End: 2023-12-31

## 2022-12-29 RX ORDER — ALENDRONATE SODIUM 70 MG/1
70 TABLET ORAL WEEKLY
Qty: 12 TABLET | Refills: 3 | Status: SHIPPED | OUTPATIENT
Start: 2022-12-29 | End: 2023-09-18 | Stop reason: SDUPTHER

## 2022-12-29 NOTE — PROGRESS NOTES
Carisa Curry  67 y.o. Black or  female  [id number]    Chief Complaint:  Chief Complaint   Patient presents with    Follow-up     History of Present Illness:  HTN--stable.   Hypokalemia--compliant with potassium.   DM--compliant with medication.   Microalbuminuria--compliant with losartan. Denies taking NSAID's.   HLD--compliant with pravastatin.   Lab Results   Component Value Date    LDLCALC 67.6 06/29/2022     Laboratory:  Lab Results   Component Value Date    WBC 9.02 06/29/2022    HGB 10.7 (L) 06/29/2022    HCT 33.8 (L) 06/29/2022     06/29/2022    CHOL 138 06/29/2022    TRIG 72 06/29/2022    HDL 56 06/29/2022    ALT 7 (L) 06/29/2022    AST 11 06/29/2022     06/29/2022    K 4.4 06/29/2022     06/29/2022    CREATININE 1.4 06/29/2022    BUN 21 06/29/2022    CO2 27 06/29/2022    TSH 0.721 07/01/2015    HGBA1C 5.5 06/29/2022     Osteoporosis--compliant with alendronate. She is due for a repeat DEXA.     History:  Past Medical History:   Diagnosis Date    Arthritis     CHF (congestive heart failure)     Diabetes mellitus, type 2     Diabetic retinopathy     Hyperlipidemia     Hypertension     Schizophrenia     Seizures     reported per pt and sister, nothing since childhood       Current Outpatient Medications:     ARIPiprazole (ABILIFY) 10 MG Tab, Take 1 tablet (10 mg total) by mouth once daily., Disp: 30 tablet, Rfl: 5    benztropine (COGENTIN) 1 MG tablet, Take 1/2 to 1 tablet twice daily as needed for side effects., Disp: 60 tablet, Rfl: 5    blood sugar diagnostic Strp, To check BG 2 times daily, to use with insurance preferred meter, Disp: 100 strip, Rfl: 0    cholecalciferol, vitamin D3, (VITAMIN D3) 25 mcg (1,000 unit) capsule, Take 1 capsule (1,000 Units total) by mouth once daily., Disp: 30 capsule, Rfl: 11    diclofenac sodium (VOLTAREN) 1 % Gel, Apply 2 g topically 4 (four) times daily. for 10 days, Disp: 100 g, Rfl: 0    FREESTYLE LANCETS 28 gauge lancets, Apply  "topically once daily., Disp: , Rfl:     insulin needles, disposable, (PEN NEEDLE) 31 X 5/16 " Ndle, Once a day insulin injection., Disp: 30 each, Rfl: 6    lancets Misc, To check BG 2 times daily, to use with insurance preferred meter, Disp: 100 each, Rfl: 0    metoprolol succinate (TOPROL-XL) 25 MG 24 hr tablet, Take 1 tablet (25 mg total) by mouth once daily., Disp: 90 tablet, Rfl: 3    nystatin (MYCOSTATIN) powder, Apply topically 3 (three) times daily., Disp: 60 g, Rfl: 3    alendronate (FOSAMAX) 70 MG tablet, Take 1 tablet (70 mg total) by mouth once a week., Disp: 12 tablet, Rfl: 3    amLODIPine (NORVASC) 10 MG tablet, Take 1 tablet (10 mg total) by mouth once daily., Disp: 90 tablet, Rfl: 3    blood-glucose meter kit, To check BG 2 times daily, to use with insurance preferred meter, Disp: 1 each, Rfl: 0    hydroCHLOROthiazide (HYDRODIURIL) 25 MG tablet, Take 1 tablet (25 mg total) by mouth once daily., Disp: 90 tablet, Rfl: 3    losartan (COZAAR) 100 MG tablet, Take 1 tablet (100 mg total) by mouth once daily., Disp: 90 tablet, Rfl: 3    metFORMIN (GLUCOPHAGE) 1000 MG tablet, TAKE ONE TABLET BY MOUTH TWICE DAILY WITH MEALS, Disp: 180 tablet, Rfl: 3    potassium chloride SA (K-DUR,KLOR-CON M) 10 MEQ tablet, Take 1 tablet (10 mEq total) by mouth once daily., Disp: 90 tablet, Rfl: 3    pravastatin (PRAVACHOL) 40 MG tablet, Take 1 tablet (40 mg total) by mouth once daily., Disp: 90 tablet, Rfl: 3      Allergies:  Review of patient's allergies indicates:  No Known Allergies    Review of Systems   Constitutional:  Negative for fever.   Eyes:  Negative for blurred vision.   Respiratory:  Negative for shortness of breath.    Cardiovascular:  Positive for leg swelling. Negative for chest pain.   Neurological:  Negative for dizziness, tingling and headaches.     Exam:  Vitals:    12/29/22 1121   BP: 134/64   Pulse: 80   Resp: 20   Temp: 97.5 °F (36.4 °C)     Weight: 96.7 kg (213 lb 3 oz)   Body mass index is 36.59 " kg/m².      Physical Exam  Vitals reviewed.   Constitutional:       General: She is not in acute distress.     Appearance: She is well-developed. She is not ill-appearing.   Eyes:      General: No scleral icterus.  Cardiovascular:      Rate and Rhythm: Normal rate and regular rhythm.      Pulses:           Dorsalis pedis pulses are 2+ on the right side and 2+ on the left side.      Heart sounds: Normal heart sounds.   Pulmonary:      Effort: Pulmonary effort is normal. No respiratory distress.      Breath sounds: Normal breath sounds. No wheezing or rales.   Musculoskeletal:      Right lower leg: Edema present.      Left lower leg: Edema present.   Feet:      Right foot:      Protective Sensation: 5 sites tested.  5 sites sensed.      Skin integrity: Callus present. No ulcer.      Left foot:      Protective Sensation: 5 sites tested.  5 sites sensed.      Skin integrity: Callus present. No ulcer.   Skin:     General: Skin is warm and dry.   Neurological:      Mental Status: She is alert and oriented to person, place, and time.   Psychiatric:         Behavior: Behavior normal.       Assessment:  The primary encounter diagnosis was Hypertension goal BP (blood pressure) < 130/80. Diagnoses of Diuretic-induced hypokalemia, Controlled type 2 diabetes mellitus with microalbuminuria, without long-term current use of insulin, Hyperlipidemia LDL goal <70, Age-related osteoporosis without current pathological fracture, and Immunization due were also pertinent to this visit.    Plan:  Refill all medications    Hypertension goal BP (blood pressure) < 130/80  -     amLODIPine (NORVASC) 10 MG tablet; Take 1 tablet (10 mg total) by mouth once daily.  Dispense: 90 tablet; Refill: 3  -     hydroCHLOROthiazide (HYDRODIURIL) 25 MG tablet; Take 1 tablet (25 mg total) by mouth once daily.  Dispense: 90 tablet; Refill: 3  -     losartan (COZAAR) 100 MG tablet; Take 1 tablet (100 mg total) by mouth once daily.  Dispense: 90 tablet;  Refill: 3    Diuretic-induced hypokalemia  -     potassium chloride SA (K-DUR,KLOR-CON M) 10 MEQ tablet; Take 1 tablet (10 mEq total) by mouth once daily.  Dispense: 90 tablet; Refill: 3    Controlled type 2 diabetes mellitus with microalbuminuria, without long-term current use of insulin  -     Microalbumin/Creatinine Ratio, Urine  -     losartan (COZAAR) 100 MG tablet; Take 1 tablet (100 mg total) by mouth once daily.  Dispense: 90 tablet; Refill: 3  -     metFORMIN (GLUCOPHAGE) 1000 MG tablet; TAKE ONE TABLET BY MOUTH TWICE DAILY WITH MEALS  Dispense: 180 tablet; Refill: 3    Hyperlipidemia LDL goal <70  -     pravastatin (PRAVACHOL) 40 MG tablet; Take 1 tablet (40 mg total) by mouth once daily.  Dispense: 90 tablet; Refill: 3    Age-related osteoporosis without current pathological fracture  -     DXA Bone Density Spine And Hip; Future; Expected date: 01/05/2023  -     alendronate (FOSAMAX) 70 MG tablet; Take 1 tablet (70 mg total) by mouth once a week.  Dispense: 12 tablet; Refill: 3    Immunization due  -     Influenza - Quadrivalent (Adjuvanted)    Follow up in about 6 months (around 6/29/2023).

## 2023-01-03 ENCOUNTER — TELEPHONE (OUTPATIENT)
Dept: INTERNAL MEDICINE | Facility: CLINIC | Age: 68
End: 2023-01-03
Payer: MEDICARE

## 2023-01-03 ENCOUNTER — APPOINTMENT (OUTPATIENT)
Dept: RADIOLOGY | Facility: HOSPITAL | Age: 68
End: 2023-01-03
Attending: INTERNAL MEDICINE
Payer: MEDICARE

## 2023-01-03 DIAGNOSIS — M81.0 AGE-RELATED OSTEOPOROSIS WITHOUT CURRENT PATHOLOGICAL FRACTURE: ICD-10-CM

## 2023-01-03 DIAGNOSIS — E11.29 CONTROLLED TYPE 2 DIABETES MELLITUS WITH MICROALBUMINURIA, WITHOUT LONG-TERM CURRENT USE OF INSULIN: Primary | ICD-10-CM

## 2023-01-03 DIAGNOSIS — R80.9 CONTROLLED TYPE 2 DIABETES MELLITUS WITH MICROALBUMINURIA, WITHOUT LONG-TERM CURRENT USE OF INSULIN: Primary | ICD-10-CM

## 2023-01-03 PROCEDURE — 77080 DXA BONE DENSITY AXIAL: CPT | Mod: 26,,, | Performed by: RADIOLOGY

## 2023-01-03 PROCEDURE — 77080 DXA BONE DENSITY AXIAL: CPT | Mod: TC

## 2023-01-03 PROCEDURE — 77080 DEXA BONE DENSITY SPINE HIP: ICD-10-PCS | Mod: 26,,, | Performed by: RADIOLOGY

## 2023-01-03 NOTE — TELEPHONE ENCOUNTER
Pt is in clinic stating she was supposed to give a urine specimen but no orders in chart.  Please advise.

## 2023-01-09 ENCOUNTER — PES CALL (OUTPATIENT)
Dept: ADMINISTRATIVE | Facility: CLINIC | Age: 68
End: 2023-01-09
Payer: MEDICARE

## 2023-01-20 ENCOUNTER — PATIENT OUTREACH (OUTPATIENT)
Dept: ADMINISTRATIVE | Facility: HOSPITAL | Age: 68
End: 2023-01-20
Payer: MEDICARE

## 2023-01-20 NOTE — PROGRESS NOTES
Attempted to contact patient by phone for a MAMMOGRAM SCHEDULING, was able to leave voice mail message.  Updated Care Everywhere and Team.       Cardiac

## 2023-01-30 ENCOUNTER — PATIENT OUTREACH (OUTPATIENT)
Dept: ADMINISTRATIVE | Facility: HOSPITAL | Age: 68
End: 2023-01-30
Payer: MEDICARE

## 2023-01-30 NOTE — PROGRESS NOTES
Working the mammogram report Called patient to discuss scheduling a mammogram I was told patient wasn't in at the moment

## 2023-03-10 ENCOUNTER — TELEPHONE (OUTPATIENT)
Dept: ADMINISTRATIVE | Facility: HOSPITAL | Age: 68
End: 2023-03-10
Payer: MEDICARE

## 2023-05-18 RX ORDER — BENZTROPINE MESYLATE 0.5 MG/1
TABLET ORAL
Qty: 60 TABLET | Refills: 0 | Status: SHIPPED | OUTPATIENT
Start: 2023-05-18 | End: 2024-01-03 | Stop reason: SDUPTHER

## 2023-06-08 ENCOUNTER — PES CALL (OUTPATIENT)
Dept: ADMINISTRATIVE | Facility: CLINIC | Age: 68
End: 2023-06-08
Payer: MEDICARE

## 2023-06-29 ENCOUNTER — OFFICE VISIT (OUTPATIENT)
Dept: INTERNAL MEDICINE | Facility: CLINIC | Age: 68
End: 2023-06-29
Payer: MEDICARE

## 2023-06-29 VITALS
BODY MASS INDEX: 37.16 KG/M2 | OXYGEN SATURATION: 100 % | RESPIRATION RATE: 20 BRPM | SYSTOLIC BLOOD PRESSURE: 120 MMHG | HEIGHT: 64 IN | HEART RATE: 60 BPM | DIASTOLIC BLOOD PRESSURE: 70 MMHG | WEIGHT: 217.63 LBS | TEMPERATURE: 97 F

## 2023-06-29 DIAGNOSIS — E66.01 SEVERE OBESITY (BMI 35.0-39.9) WITH COMORBIDITY: ICD-10-CM

## 2023-06-29 DIAGNOSIS — D64.9 NORMOCYTIC ANEMIA: ICD-10-CM

## 2023-06-29 DIAGNOSIS — Z12.31 ENCOUNTER FOR SCREENING MAMMOGRAM FOR MALIGNANT NEOPLASM OF BREAST: ICD-10-CM

## 2023-06-29 DIAGNOSIS — E11.29 CONTROLLED TYPE 2 DIABETES MELLITUS WITH MICROALBUMINURIA, WITHOUT LONG-TERM CURRENT USE OF INSULIN: Chronic | ICD-10-CM

## 2023-06-29 DIAGNOSIS — E78.5 HYPERLIPIDEMIA LDL GOAL <70: Chronic | ICD-10-CM

## 2023-06-29 DIAGNOSIS — R80.9 CONTROLLED TYPE 2 DIABETES MELLITUS WITH MICROALBUMINURIA, WITHOUT LONG-TERM CURRENT USE OF INSULIN: Chronic | ICD-10-CM

## 2023-06-29 DIAGNOSIS — Z12.11 COLON CANCER SCREENING: ICD-10-CM

## 2023-06-29 DIAGNOSIS — I10 HYPERTENSION GOAL BP (BLOOD PRESSURE) < 130/80: Primary | Chronic | ICD-10-CM

## 2023-06-29 PROCEDURE — 99999 PR PBB SHADOW E&M-EST. PATIENT-LVL V: ICD-10-PCS | Mod: PBBFAC,,, | Performed by: INTERNAL MEDICINE

## 2023-06-29 PROCEDURE — 99214 PR OFFICE/OUTPT VISIT, EST, LEVL IV, 30-39 MIN: ICD-10-PCS | Mod: S$PBB,,, | Performed by: INTERNAL MEDICINE

## 2023-06-29 PROCEDURE — 99999 PR PBB SHADOW E&M-EST. PATIENT-LVL V: CPT | Mod: PBBFAC,,, | Performed by: INTERNAL MEDICINE

## 2023-06-29 PROCEDURE — 99215 OFFICE O/P EST HI 40 MIN: CPT | Mod: PBBFAC | Performed by: INTERNAL MEDICINE

## 2023-06-29 PROCEDURE — 99214 OFFICE O/P EST MOD 30 MIN: CPT | Mod: S$PBB,,, | Performed by: INTERNAL MEDICINE

## 2023-06-29 RX ORDER — ARIPIPRAZOLE 10 MG/1
TABLET ORAL
Qty: 30 TABLET | Refills: 1 | Status: SHIPPED | OUTPATIENT
Start: 2023-06-29 | End: 2024-01-03 | Stop reason: SDUPTHER

## 2023-06-29 NOTE — PROGRESS NOTES
Carisa Curry  68 y.o. Black or  female  [id number]    Chief Complaint:  Chief Complaint   Patient presents with    Follow-up       History of Present Illness:  Here with her sister.   HTN--stable. Checks b/p regularly at home.   DM--compliant with metformin.   HLD--compliant with pravastatin.   Anemia--denies symptoms.     Laboratory:  Lab Results   Component Value Date    WBC 9.02 06/29/2022    HGB 10.7 (L) 06/29/2022    HCT 33.8 (L) 06/29/2022     06/29/2022    CHOL 138 06/29/2022    TRIG 72 06/29/2022    HDL 56 06/29/2022    ALT 6 (L) 12/29/2022    AST 12 12/29/2022     12/29/2022    K 3.8 12/29/2022     12/29/2022    CREATININE 1.1 12/29/2022    BUN 21 12/29/2022    CO2 26 12/29/2022    TSH 0.721 07/01/2015    HGBA1C 5.6 12/29/2022     Lab Results   Component Value Date    LDLCALC 67.6 06/29/2022       History:  Past Medical History:   Diagnosis Date    Arthritis     CHF (congestive heart failure)     Diabetes mellitus, type 2     Diabetic retinopathy     Hyperlipidemia     Hypertension     Schizophrenia     Seizures     reported per pt and sister, nothing since childhood       Medications:  Current Outpatient Medications on File Prior to Visit   Medication Sig Dispense Refill    alendronate (FOSAMAX) 70 MG tablet Take 1 tablet (70 mg total) by mouth once a week. 12 tablet 3    amLODIPine (NORVASC) 10 MG tablet Take 1 tablet (10 mg total) by mouth once daily. 90 tablet 3    benztropine (COGENTIN) 0.5 MG tablet TAKE ONE TABLET BY MOUTH TWICE DAILY AS NEEDED 60 tablet 0    benztropine (COGENTIN) 1 MG tablet Take 1/2 to 1 tablet twice daily as needed for side effects. 60 tablet 5    blood sugar diagnostic Strp To check BG 2 times daily, to use with insurance preferred meter 100 strip 0    blood-glucose meter kit To check BG 2 times daily, to use with insurance preferred meter 1 each 0    cholecalciferol, vitamin D3, (VITAMIN D3) 25 mcg (1,000 unit) capsule Take 1 capsule  "(1,000 Units total) by mouth once daily. 30 capsule 11    diclofenac sodium (VOLTAREN) 1 % Gel Apply 2 g topically 4 (four) times daily. for 10 days 100 g 0    FREESTYLE LANCETS 28 gauge lancets Apply topically once daily.      hydroCHLOROthiazide (HYDRODIURIL) 25 MG tablet Take 1 tablet (25 mg total) by mouth once daily. 90 tablet 3    insulin needles, disposable, (PEN NEEDLE) 31 X 5/16 " Ndle Once a day insulin injection. 30 each 6    lancets Misc To check BG 2 times daily, to use with insurance preferred meter 100 each 0    losartan (COZAAR) 100 MG tablet Take 1 tablet (100 mg total) by mouth once daily. 90 tablet 3    metFORMIN (GLUCOPHAGE) 1000 MG tablet TAKE ONE TABLET BY MOUTH TWICE DAILY WITH MEALS 180 tablet 3    metoprolol succinate (TOPROL-XL) 25 MG 24 hr tablet TAKE ONE TABLET BY MOUTH ONE TIME DAILY 90 tablet 3    nystatin (MYCOSTATIN) powder Apply topically 3 (three) times daily. 60 g 3    potassium chloride SA (K-DUR,KLOR-CON M) 10 MEQ tablet Take 1 tablet (10 mEq total) by mouth once daily. 90 tablet 3    pravastatin (PRAVACHOL) 40 MG tablet Take 1 tablet (40 mg total) by mouth once daily. 90 tablet 3     No current facility-administered medications on file prior to visit.       Allergies:  Review of patient's allergies indicates:  No Known Allergies    Review of Systems   Respiratory:  Negative for shortness of breath.    Cardiovascular:  Negative for chest pain.   Gastrointestinal:  Negative for abdominal pain.   Neurological:  Negative for dizziness and headaches.     Exam:  Vitals:    06/29/23 1546   BP: 120/70   Pulse:    Resp:    Temp:      Weight: 98.7 kg (217 lb 9.5 oz)   Body mass index is 37.35 kg/m².      Physical Exam  Vitals reviewed.   Constitutional:       General: She is not in acute distress.     Appearance: She is well-developed. She is not ill-appearing.   Eyes:      General: No scleral icterus.  Cardiovascular:      Rate and Rhythm: Normal rate and regular rhythm.      Heart " sounds: Normal heart sounds.   Pulmonary:      Effort: Pulmonary effort is normal. No respiratory distress.      Breath sounds: Normal breath sounds.   Skin:     General: Skin is warm and dry.   Neurological:      Mental Status: She is alert and oriented to person, place, and time.   Psychiatric:         Behavior: Behavior normal.       Assessment:  The primary encounter diagnosis was Hypertension goal BP (blood pressure) < 130/80. Diagnoses of Controlled type 2 diabetes mellitus with microalbuminuria, without long-term current use of insulin, Hyperlipidemia LDL goal <70, Severe obesity (BMI 35.0-39.9) with comorbidity, Colon cancer screening, and Encounter for screening mammogram for malignant neoplasm of breast were also pertinent to this visit.    Plan:  Hypertension goal BP (blood pressure) < 130/80  -     continue losartan, HCTZ and amlodipine    Controlled type 2 diabetes mellitus with microalbuminuria, without long-term current use of insulin  -     Microalbumin/Creatinine Ratio, Urine; Future  -     COMPREHENSIVE METABOLIC PANEL; Future; Expected date: 06/29/2023  -     Hemoglobin A1C; Future; Expected date: 09/29/2023    Hyperlipidemia LDL goal <70  -     Lipid Panel; Future; Expected date: 12/29/2023    Normocytic anemia  -     check iron studies    Severe obesity (BMI 35.0-39.9) with comorbidity  -     Lifestyle modifications discussed    Colon cancer screening  -     Cologuard Screening (Multitarget Stool DNA); Future; Expected date: 06/29/2023    Encounter for screening mammogram for malignant neoplasm of breast  -     Mammo Digital Screening Bilat w/ Alcides; Future    Schedule labs.     Follow up in about 6 months (around 12/29/2023).

## 2023-06-30 ENCOUNTER — PATIENT OUTREACH (OUTPATIENT)
Dept: ADMINISTRATIVE | Facility: HOSPITAL | Age: 68
End: 2023-06-30
Payer: MEDICARE

## 2023-07-03 ENCOUNTER — PATIENT OUTREACH (OUTPATIENT)
Dept: ADMINISTRATIVE | Facility: HOSPITAL | Age: 68
End: 2023-07-03
Payer: COMMERCIAL

## 2023-07-03 ENCOUNTER — LAB VISIT (OUTPATIENT)
Dept: LAB | Facility: HOSPITAL | Age: 68
End: 2023-07-03
Attending: INTERNAL MEDICINE
Payer: COMMERCIAL

## 2023-07-03 DIAGNOSIS — E11.29 CONTROLLED TYPE 2 DIABETES MELLITUS WITH MICROALBUMINURIA, WITHOUT LONG-TERM CURRENT USE OF INSULIN: Chronic | ICD-10-CM

## 2023-07-03 DIAGNOSIS — R80.9 CONTROLLED TYPE 2 DIABETES MELLITUS WITH MICROALBUMINURIA, WITHOUT LONG-TERM CURRENT USE OF INSULIN: Chronic | ICD-10-CM

## 2023-07-03 LAB
ALBUMIN/CREAT UR: 14.9 UG/MG (ref 0–30)
CREAT UR-MCNC: 141 MG/DL (ref 15–325)
MICROALBUMIN UR DL<=1MG/L-MCNC: 21 UG/ML

## 2023-07-03 PROCEDURE — 82570 ASSAY OF URINE CREATININE: CPT | Performed by: INTERNAL MEDICINE

## 2023-07-11 ENCOUNTER — TELEPHONE (OUTPATIENT)
Dept: INTERNAL MEDICINE | Facility: CLINIC | Age: 68
End: 2023-07-11
Payer: COMMERCIAL

## 2023-07-11 DIAGNOSIS — E11.29 CONTROLLED TYPE 2 DIABETES MELLITUS WITH MICROALBUMINURIA, WITHOUT LONG-TERM CURRENT USE OF INSULIN: Primary | ICD-10-CM

## 2023-07-11 DIAGNOSIS — R80.9 CONTROLLED TYPE 2 DIABETES MELLITUS WITH MICROALBUMINURIA, WITHOUT LONG-TERM CURRENT USE OF INSULIN: Primary | ICD-10-CM

## 2023-07-11 DIAGNOSIS — E78.5 HYPERLIPIDEMIA LDL GOAL <70: ICD-10-CM

## 2023-07-11 NOTE — TELEPHONE ENCOUNTER
----- Message from Guadalupe Guzman DO sent at 7/10/2023  2:11 PM CDT -----  Please contact the patient and let them know that their results are stable and do not require any change in treatment.  Schedule A1C, CMP, lipid panel and f/u with Alina in 6 months.

## 2023-08-08 ENCOUNTER — PES CALL (OUTPATIENT)
Dept: ADMINISTRATIVE | Facility: CLINIC | Age: 68
End: 2023-08-08
Payer: COMMERCIAL

## 2023-09-18 DIAGNOSIS — M81.0 AGE-RELATED OSTEOPOROSIS WITHOUT CURRENT PATHOLOGICAL FRACTURE: ICD-10-CM

## 2023-09-18 NOTE — TELEPHONE ENCOUNTER
Refill Routing Note   Medication(s) are not appropriate for processing by [facility] for the following reason(s):      Medication outside of protocol    [facility] action(s):  Route Care Due:  None identified              Appointments  past 12m or future 3m with PCP    Date Provider   Last Visit   6/29/2023 Guadalupe Guzman DO   Next Visit   Visit date not found Guadalupe Guzman DO   ED visits in past 90 days: 0        Note composed:11:34 AM 09/18/2023

## 2023-09-19 RX ORDER — ALENDRONATE SODIUM 70 MG/1
70 TABLET ORAL WEEKLY
Qty: 12 TABLET | Refills: 3 | Status: SHIPPED | OUTPATIENT
Start: 2023-09-19

## 2023-11-01 ENCOUNTER — PATIENT OUTREACH (OUTPATIENT)
Dept: ADMINISTRATIVE | Facility: HOSPITAL | Age: 68
End: 2023-11-01
Payer: COMMERCIAL

## 2023-11-01 NOTE — PROGRESS NOTES
Working eye exam report; Chart searched; Called and spoke with pt's daughter - Scheduled 12/26/2023

## 2023-12-30 DIAGNOSIS — E11.29 CONTROLLED TYPE 2 DIABETES MELLITUS WITH MICROALBUMINURIA, WITHOUT LONG-TERM CURRENT USE OF INSULIN: ICD-10-CM

## 2023-12-30 DIAGNOSIS — I10 HYPERTENSION GOAL BP (BLOOD PRESSURE) < 130/80: ICD-10-CM

## 2023-12-30 DIAGNOSIS — R80.9 CONTROLLED TYPE 2 DIABETES MELLITUS WITH MICROALBUMINURIA, WITHOUT LONG-TERM CURRENT USE OF INSULIN: ICD-10-CM

## 2023-12-30 NOTE — TELEPHONE ENCOUNTER
Care Due:                  Date            Visit Type   Department     Provider  --------------------------------------------------------------------------------                                EP -                              PRIMARY      [facility] INTER[facility]  Last Visit: 06-      CARE ([facility])   MEDICINE       Guadalupe Guzman  Next Visit: None Scheduled  None         None Found                                                            Last  Test          Frequency    Reason                     Performed    Due Date  --------------------------------------------------------------------------------    HBA1C.......  6 months...  metFORMIN................  07- 12-    Mg Level....  12 months..  alendronate..............  Not Found    Overdue    Phosphate...  12 months..  alendronate..............  Not Found    Overdue    Vitamin D...  12 months..  alendronate..............  Not Found    Overdue    [employer] Embedded Care Due Messages. Reference number: [id number].   12/30/2023 1:31:38 AM CST

## 2023-12-31 RX ORDER — AMLODIPINE BESYLATE 10 MG/1
10 TABLET ORAL
Qty: 90 TABLET | Refills: 1 | Status: SHIPPED | OUTPATIENT
Start: 2023-12-31

## 2023-12-31 RX ORDER — HYDROCHLOROTHIAZIDE 25 MG/1
25 TABLET ORAL
Qty: 90 TABLET | Refills: 1 | Status: SHIPPED | OUTPATIENT
Start: 2023-12-31

## 2023-12-31 RX ORDER — LOSARTAN POTASSIUM 100 MG/1
100 TABLET ORAL
Qty: 90 TABLET | Refills: 1 | Status: SHIPPED | OUTPATIENT
Start: 2023-12-31

## 2023-12-31 NOTE — TELEPHONE ENCOUNTER
Provider Staff:  Action required for this patient    Requires labs      Please see care gap opportunities below in Care Due Message.    Thanks!  [facility]     Appointments      Date Provider   Last Visit   6/29/2023 Guadalupe Guzman DO   Next Visit   Visit date not found Guadalupe Guzman DO     Refill Decision Note   Carisa Curry  is requesting a refill authorization.  Brief Assessment and Rationale for Refill:  Approve     Medication Therapy Plan:         Comments:     Note composed:11:48 AM 12/31/2023             Appointments     Last Visit   6/29/2023 Guadalupe Guzman DO   Next Visit   Visit date not found Guadalupe Guzman DO

## 2024-01-03 ENCOUNTER — OFFICE VISIT (OUTPATIENT)
Dept: PSYCHIATRY | Facility: CLINIC | Age: 69
End: 2024-01-03
Payer: COMMERCIAL

## 2024-01-03 VITALS
SYSTOLIC BLOOD PRESSURE: 173 MMHG | BODY MASS INDEX: 36.9 KG/M2 | HEART RATE: 73 BPM | WEIGHT: 214.94 LBS | DIASTOLIC BLOOD PRESSURE: 73 MMHG

## 2024-01-03 DIAGNOSIS — F20.9 SCHIZOPHRENIA, UNSPECIFIED TYPE: Primary | ICD-10-CM

## 2024-01-03 DIAGNOSIS — T88.9XXD SIDE EFFECTS OF TREATMENT, SUBSEQUENT ENCOUNTER: ICD-10-CM

## 2024-01-03 DIAGNOSIS — G47.00 INSOMNIA, UNSPECIFIED TYPE: ICD-10-CM

## 2024-01-03 PROCEDURE — 99214 OFFICE O/P EST MOD 30 MIN: CPT | Mod: S$GLB,,, | Performed by: PSYCHIATRY & NEUROLOGY

## 2024-01-03 PROCEDURE — 3077F SYST BP >= 140 MM HG: CPT | Mod: CPTII,S$GLB,, | Performed by: PSYCHIATRY & NEUROLOGY

## 2024-01-03 PROCEDURE — 3008F BODY MASS INDEX DOCD: CPT | Mod: CPTII,S$GLB,, | Performed by: PSYCHIATRY & NEUROLOGY

## 2024-01-03 PROCEDURE — 99999 PR PBB SHADOW E&M-EST. PATIENT-LVL II: CPT | Mod: PBBFAC,,, | Performed by: PSYCHIATRY & NEUROLOGY

## 2024-01-03 PROCEDURE — 3078F DIAST BP <80 MM HG: CPT | Mod: CPTII,S$GLB,, | Performed by: PSYCHIATRY & NEUROLOGY

## 2024-01-03 RX ORDER — TRAZODONE HYDROCHLORIDE 100 MG/1
TABLET ORAL
Qty: 30 TABLET | Refills: 3 | Status: SHIPPED | OUTPATIENT
Start: 2024-01-03

## 2024-01-03 RX ORDER — ARIPIPRAZOLE 10 MG/1
10 TABLET ORAL DAILY
Qty: 30 TABLET | Refills: 3 | Status: SHIPPED | OUTPATIENT
Start: 2024-01-03

## 2024-01-03 RX ORDER — BENZTROPINE MESYLATE 0.5 MG/1
0.5 TABLET ORAL 2 TIMES DAILY PRN
Qty: 60 TABLET | Refills: 3 | Status: SHIPPED | OUTPATIENT
Start: 2024-01-03
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
## 2024-01-10 ENCOUNTER — TELEPHONE (OUTPATIENT)
Dept: INTERNAL MEDICINE | Facility: CLINIC | Age: 69
End: 2024-01-10
Payer: COMMERCIAL

## 2024-01-10 NOTE — TELEPHONE ENCOUNTER
----- Message from Fernando [address] sent at 1/10/2024  9:57 AM CST -----  Contact: pt sister anjali  Type:  Sooner Apoointment Request    Caller is requesting a sooner appointment.  Caller declined first available appointment listed below.  Caller will not accept being placed on the waitlist and is requesting a message be sent to doctor.  Name of Caller: pt sister   When is the first available appointment? 05/2024  Symptoms: 6 mnth follow up   Would the patient rather a call back or a response via [facility]? phone  Best Call Back Number: [phone]  Additional Information:  has an appt with Ms Dior but wants to see Dr Guzman and cannot come in on a Wed

## 2024-01-10 NOTE — TELEPHONE ENCOUNTER
Returned pt call concerning a sooner appointment than the one she has scheduled. Advised pt of the dates and time. Pt verbalized understanding.

## 2024-01-18 DIAGNOSIS — E11.29 CONTROLLED TYPE 2 DIABETES MELLITUS WITH MICROALBUMINURIA, WITHOUT LONG-TERM CURRENT USE OF INSULIN: ICD-10-CM

## 2024-01-18 DIAGNOSIS — R80.9 CONTROLLED TYPE 2 DIABETES MELLITUS WITH MICROALBUMINURIA, WITHOUT LONG-TERM CURRENT USE OF INSULIN: ICD-10-CM

## 2024-01-18 NOTE — TELEPHONE ENCOUNTER
No care due was identified.  [facility] Embedded Care Due Messages. Reference number: [id number].   1/18/2024 1:33:43 AM CST

## 2024-01-18 NOTE — TELEPHONE ENCOUNTER
Refill Routing Note   Medication(s) are not appropriate for processing by [facility] for the following reason(s):        Required labs outdated    [facility] action(s):  Defer               Appointments  past 12m or future 3m with PCP    Date Provider   Last Visit   6/29/2023 Guadalupe Guzman DO   Next Visit   5/13/2024 Guadalupe Guzman DO   ED visits in past 90 days: 0        Note composed:3:23 PM 01/18/2024

## 2024-01-19 RX ORDER — METFORMIN HYDROCHLORIDE 1000 MG/1
TABLET ORAL
Qty: 180 TABLET | Refills: 1 | Status: SHIPPED | OUTPATIENT
Start: 2024-01-19

## 2024-01-23 DIAGNOSIS — B37.2 INTERTRIGINOUS CANDIDIASIS: ICD-10-CM

## 2024-01-24 RX ORDER — NYSTATIN 100000 [USP'U]/G
POWDER TOPICAL
Qty: 60 G | Refills: 0 | Status: SHIPPED | OUTPATIENT
Start: 2024-01-24

## 2024-03-02 DIAGNOSIS — I10 HYPERTENSION GOAL BP (BLOOD PRESSURE) < 130/80: Chronic | ICD-10-CM

## 2024-03-02 DIAGNOSIS — E78.5 HYPERLIPIDEMIA LDL GOAL <70: ICD-10-CM

## 2024-03-02 NOTE — TELEPHONE ENCOUNTER
Care Due:                  Date            Visit Type   Department     Provider  --------------------------------------------------------------------------------                                EP -                              PRIMARY      [facility] INTERNAL  Last Visit: 06-      CARE ([facility])   HARESH Guzman                              EP -                              PRIMARY      [facility] INTERNAL  Next Visit: 05-      CARE ([facility])   HARESH Guzman                                                            Last  Test          Frequency    Reason                     Performed    Due Date  --------------------------------------------------------------------------------    HBA1C.......  6 months...  metFORMIN................  07- 12-    Mg Level....  12 months..  alendronate..............  Not Found    Overdue    Phosphate...  12 months..  alendronate..............  Not Found    Overdue    Vitamin D...  12 months..  alendronate..............  Not Found    Overdue    [employer] Embedded Care Due Messages. Reference number: [id number].   3/02/2024 3:10:18 PM CST

## 2024-03-03 RX ORDER — PRAVASTATIN SODIUM 40 MG/1
40 TABLET ORAL
Qty: 90 TABLET | Refills: 1 | Status: SHIPPED | OUTPATIENT
Start: 2024-03-03

## 2024-03-03 NOTE — TELEPHONE ENCOUNTER
Refill Routing Note   Medication(s) are not appropriate for processing by [facility] for the following reason(s):        Required vitals abnormal    [facility] action(s):  Defer  Approve   Requires labs : Yes             Appointments  past 12m or future 3m with PCP    Date Provider   Last Visit   6/29/2023 Guadalupe Guzman, DO   Next Visit   5/13/2024 Guadalupe Guzman, DO   ED visits in past 90 days: 0        Note composed:3:24 AM 03/03/2024

## 2024-03-04 RX ORDER — METOPROLOL SUCCINATE 25 MG/1
TABLET, EXTENDED RELEASE ORAL
Qty: 90 TABLET | Refills: 0 | Status: SHIPPED | OUTPATIENT
Start: 2024-03-04 | End: 2024-06-04

## 2024-05-01 ENCOUNTER — TELEPHONE (OUTPATIENT)
Dept: PSYCHIATRY | Facility: CLINIC | Age: 69
End: 2024-05-01
Payer: COMMERCIAL

## 2024-05-31 DIAGNOSIS — I10 HYPERTENSION GOAL BP (BLOOD PRESSURE) < 130/80: Chronic | ICD-10-CM

## 2024-05-31 NOTE — TELEPHONE ENCOUNTER
Refill Routing Note   Medication(s) are not appropriate for processing by [facility] for the following reason(s):        Required vitals abnormal    [facility] action(s):  Defer   Requires labs : Yes               Appointments  past 12m or future 3m with PCP    Date Provider   Last Visit   6/29/2023 Guadalupe Guzman, DO   Next Visit   7/30/2024 Guadalupe Guzman,    ED visits in past 90 days: 0        Note composed:11:01 AM 05/31/2024

## 2024-05-31 NOTE — TELEPHONE ENCOUNTER
Care Due:                  Date            Visit Type   Department     Provider  --------------------------------------------------------------------------------                                EP -                              PRIMARY      [facility] INTERNAL  Last Visit: 06-      CARE ([facility])   HARESH Guzman                              EP -                              PRIMARY      [facility] INTERNAL  Next Visit: 07-      CARE ([facility])   HARESH Guzman                                                            Last  Test          Frequency    Reason                     Performed    Due Date  --------------------------------------------------------------------------------    CMP.........  12 months..  alendronate,               07- 06-                             hydroCHLOROthiazide,                             losartan, metFORMIN,                             potassium, pravastatin...    HBA1C.......  6 months...  metFORMIN................  07- 12-    Lipid Panel.  12 months..  pravastatin..............  07- 06-    Mg Level....  12 months..  alendronate..............  Not Found    Overdue    Phosphate...  12 months..  alendronate..............  Not Found    Overdue    Vitamin D...  12 months..  alendronate..............  Not Found    Overdue    [employer] Embedded Care Due Messages. Reference number: [id number].   5/31/2024 9:54:02 AM CDT

## 2024-06-04 RX ORDER — METOPROLOL SUCCINATE 25 MG/1
TABLET, EXTENDED RELEASE ORAL
Qty: 90 TABLET | Refills: 0 | Status: SHIPPED | OUTPATIENT
Start: 2024-06-04

## 2024-06-20 ENCOUNTER — TELEPHONE (OUTPATIENT)
Dept: INTERNAL MEDICINE | Facility: CLINIC | Age: 69
End: 2024-06-20
Payer: COMMERCIAL

## 2024-06-20 NOTE — TELEPHONE ENCOUNTER
Sister of patient states legs is swollen, blood sugar is up and down and high blood pressure. Patient legs has been swollen for two days . Patient is experiencing knee pain. I suggested patient go to the ER evaluated sister's denies states that it's just her on her own and have appointments with her  today at 3 pm. She would just try to wait until tomorrow for patient to be seen.    I do advised her to bring sister to ER if any changes occur from now until tomorrow. Agreed.

## 2024-06-20 NOTE — TELEPHONE ENCOUNTER
----- Message from Morris Stephen sent at 6/20/2024  8:14 AM CDT -----  Contact: [phone]  .Type: Patient Call Back        Who called:     Mery (Pts Sister)  What is the request in detail:    Patient legs is swollen, blood sugar is up and down and high blood pressure. Patient legs has been swollen for two days . Patient is experiencing knee pain Patients sister wants to know if patient can be seen today or tomorrow by any chance .   Best call back number:   Can the clinic reply by [facility]?           Would the patient rather a call back or a response via [facility]?        Best call back number:  [phone]

## 2024-06-21 ENCOUNTER — OFFICE VISIT (OUTPATIENT)
Dept: INTERNAL MEDICINE | Facility: CLINIC | Age: 69
End: 2024-06-21
Payer: COMMERCIAL

## 2024-06-21 ENCOUNTER — LAB VISIT (OUTPATIENT)
Dept: LAB | Facility: HOSPITAL | Age: 69
End: 2024-06-21
Attending: PHYSICIAN ASSISTANT
Payer: COMMERCIAL

## 2024-06-21 VITALS
BODY MASS INDEX: 34.1 KG/M2 | OXYGEN SATURATION: 97 % | HEART RATE: 68 BPM | DIASTOLIC BLOOD PRESSURE: 86 MMHG | WEIGHT: 198.63 LBS | RESPIRATION RATE: 20 BRPM | SYSTOLIC BLOOD PRESSURE: 138 MMHG | TEMPERATURE: 98 F

## 2024-06-21 DIAGNOSIS — M17.12 PRIMARY OSTEOARTHRITIS OF LEFT KNEE: ICD-10-CM

## 2024-06-21 DIAGNOSIS — R80.9 CONTROLLED TYPE 2 DIABETES MELLITUS WITH MICROALBUMINURIA, WITHOUT LONG-TERM CURRENT USE OF INSULIN: Chronic | ICD-10-CM

## 2024-06-21 DIAGNOSIS — M17.11 PRIMARY OSTEOARTHRITIS OF RIGHT KNEE: ICD-10-CM

## 2024-06-21 DIAGNOSIS — E11.29 CONTROLLED TYPE 2 DIABETES MELLITUS WITH MICROALBUMINURIA, WITHOUT LONG-TERM CURRENT USE OF INSULIN: Chronic | ICD-10-CM

## 2024-06-21 DIAGNOSIS — I10 HYPERTENSION GOAL BP (BLOOD PRESSURE) < 130/80: Primary | Chronic | ICD-10-CM

## 2024-06-21 DIAGNOSIS — I10 HYPERTENSION GOAL BP (BLOOD PRESSURE) < 130/80: Chronic | ICD-10-CM

## 2024-06-21 DIAGNOSIS — Z12.31 ENCOUNTER FOR SCREENING MAMMOGRAM FOR MALIGNANT NEOPLASM OF BREAST: ICD-10-CM

## 2024-06-21 DIAGNOSIS — M81.0 AGE-RELATED OSTEOPOROSIS WITHOUT CURRENT PATHOLOGICAL FRACTURE: ICD-10-CM

## 2024-06-21 DIAGNOSIS — E78.5 HYPERLIPIDEMIA LDL GOAL <70: Chronic | ICD-10-CM

## 2024-06-21 LAB
ALBUMIN SERPL BCP-MCNC: 3.7 G/DL (ref 3.5–5.2)
ALP SERPL-CCNC: 79 U/L (ref 55–135)
ALT SERPL W/O P-5'-P-CCNC: 6 U/L (ref 10–44)
ANION GAP SERPL CALC-SCNC: 11 MMOL/L (ref 8–16)
AST SERPL-CCNC: 14 U/L (ref 10–40)
BASOPHILS # BLD AUTO: 0.05 K/UL (ref 0–0.2)
BASOPHILS NFR BLD: 0.7 % (ref 0–1.9)
BILIRUB SERPL-MCNC: 0.7 MG/DL (ref 0.1–1)
BUN SERPL-MCNC: 21 MG/DL (ref 8–23)
CALCIUM SERPL-MCNC: 9.7 MG/DL (ref 8.7–10.5)
CHLORIDE SERPL-SCNC: 100 MMOL/L (ref 95–110)
CHOLEST SERPL-MCNC: 136 MG/DL (ref 120–199)
CHOLEST/HDLC SERPL: 2.4 {RATIO} (ref 2–5)
CO2 SERPL-SCNC: 23 MMOL/L (ref 23–29)
CREAT SERPL-MCNC: 1.3 MG/DL (ref 0.5–1.4)
DIFFERENTIAL METHOD BLD: ABNORMAL
EOSINOPHIL # BLD AUTO: 0.1 K/UL (ref 0–0.5)
EOSINOPHIL NFR BLD: 1.5 % (ref 0–8)
ERYTHROCYTE [DISTWIDTH] IN BLOOD BY AUTOMATED COUNT: 14.8 % (ref 11.5–14.5)
EST. GFR  (NO RACE VARIABLE): 44.2 ML/MIN/1.73 M^2
ESTIMATED AVG GLUCOSE: 103 MG/DL (ref 68–131)
GLUCOSE SERPL-MCNC: 77 MG/DL (ref 70–110)
HBA1C MFR BLD: 5.2 % (ref 4–5.6)
HCT VFR BLD AUTO: 32.9 % (ref 37–48.5)
HDLC SERPL-MCNC: 56 MG/DL (ref 40–75)
HDLC SERPL: 41.2 % (ref 20–50)
HGB BLD-MCNC: 11 G/DL (ref 12–16)
IMM GRANULOCYTES # BLD AUTO: 0.02 K/UL (ref 0–0.04)
IMM GRANULOCYTES NFR BLD AUTO: 0.3 % (ref 0–0.5)
LDLC SERPL CALC-MCNC: 68 MG/DL (ref 63–159)
LYMPHOCYTES # BLD AUTO: 2.1 K/UL (ref 1–4.8)
LYMPHOCYTES NFR BLD: 30.7 % (ref 18–48)
MCH RBC QN AUTO: 28.6 PG (ref 27–31)
MCHC RBC AUTO-ENTMCNC: 33.4 G/DL (ref 32–36)
MCV RBC AUTO: 86 FL (ref 82–98)
MONOCYTES # BLD AUTO: 0.6 K/UL (ref 0.3–1)
MONOCYTES NFR BLD: 9.2 % (ref 4–15)
NEUTROPHILS # BLD AUTO: 3.9 K/UL (ref 1.8–7.7)
NEUTROPHILS NFR BLD: 57.6 % (ref 38–73)
NONHDLC SERPL-MCNC: 80 MG/DL
NRBC BLD-RTO: 0 /100 WBC
PLATELET # BLD AUTO: 241 K/UL (ref 150–450)
PMV BLD AUTO: 12.2 FL (ref 9.2–12.9)
POTASSIUM SERPL-SCNC: 4 MMOL/L (ref 3.5–5.1)
PROT SERPL-MCNC: 8 G/DL (ref 6–8.4)
RBC # BLD AUTO: 3.84 M/UL (ref 4–5.4)
SODIUM SERPL-SCNC: 134 MMOL/L (ref 136–145)
TRIGL SERPL-MCNC: 60 MG/DL (ref 30–150)
WBC # BLD AUTO: 6.72 K/UL (ref 3.9–12.7)

## 2024-06-21 PROCEDURE — 83036 HEMOGLOBIN GLYCOSYLATED A1C: CPT | Performed by: PHYSICIAN ASSISTANT

## 2024-06-21 PROCEDURE — 99999 PR PBB SHADOW E&M-EST. PATIENT-LVL V: CPT | Mod: PBBFAC,,, | Performed by: PHYSICIAN ASSISTANT

## 2024-06-21 PROCEDURE — 80053 COMPREHEN METABOLIC PANEL: CPT | Performed by: PHYSICIAN ASSISTANT

## 2024-06-21 PROCEDURE — 80061 LIPID PANEL: CPT | Performed by: PHYSICIAN ASSISTANT

## 2024-06-21 PROCEDURE — 36415 COLL VENOUS BLD VENIPUNCTURE: CPT | Performed by: PHYSICIAN ASSISTANT

## 2024-06-21 PROCEDURE — 85025 COMPLETE CBC W/AUTO DIFF WBC: CPT | Performed by: PHYSICIAN ASSISTANT

## 2024-06-21 RX ORDER — LOSARTAN POTASSIUM 100 MG/1
100 TABLET ORAL DAILY
Qty: 90 TABLET | Refills: 1 | Status: SHIPPED | OUTPATIENT
Start: 2024-06-21

## 2024-06-21 RX ORDER — HYDROCHLOROTHIAZIDE 25 MG/1
25 TABLET ORAL DAILY
Qty: 90 TABLET | Refills: 1 | Status: SHIPPED | OUTPATIENT
Start: 2024-06-21

## 2024-06-21 RX ORDER — DICLOFENAC SODIUM 10 MG/G
2 GEL TOPICAL 4 TIMES DAILY
Qty: 200 G | Refills: 1 | Status: SHIPPED | OUTPATIENT
Start: 2024-06-21 | End: 2024-07-01

## 2024-06-21 RX ORDER — METFORMIN HYDROCHLORIDE 500 MG/1
500 TABLET, EXTENDED RELEASE ORAL 2 TIMES DAILY WITH MEALS
Qty: 180 TABLET | Refills: 1 | Status: SHIPPED | OUTPATIENT
Start: 2024-06-21 | End: 2025-06-21

## 2024-06-21 RX ORDER — ARIPIPRAZOLE 10 MG/1
10 TABLET ORAL DAILY
Qty: 30 TABLET | Refills: 1 | Status: SHIPPED | OUTPATIENT
Start: 2024-06-21

## 2024-06-21 RX ORDER — ALENDRONATE SODIUM 70 MG/1
70 TABLET ORAL WEEKLY
Qty: 12 TABLET | Refills: 1 | Status: SHIPPED | OUTPATIENT
Start: 2024-06-21

## 2024-06-21 RX ORDER — AMLODIPINE BESYLATE 10 MG/1
10 TABLET ORAL DAILY
Qty: 90 TABLET | Refills: 1 | Status: SHIPPED | OUTPATIENT
Start: 2024-06-21

## 2024-06-21 RX ORDER — PRAVASTATIN SODIUM 40 MG/1
40 TABLET ORAL DAILY
Qty: 90 TABLET | Refills: 1 | Status: SHIPPED | OUTPATIENT
Start: 2024-06-21

## 2024-06-21 RX ORDER — METOPROLOL SUCCINATE 25 MG/1
25 TABLET, EXTENDED RELEASE ORAL DAILY
Qty: 90 TABLET | Refills: 1 | Status: SHIPPED | OUTPATIENT
Start: 2024-06-21

## 2024-06-21 NOTE — PROGRESS NOTES
Subjective:      Patient ID: Carisa Curry is a 69 y.o. female.    Chief Complaint: Hypertension (She is here due to pain/swelling in both knees. States blood sugar has been dropping then becoming elevated. BP has also been elevated (160-170s))    Patient is known to me, being seen today for knee pain bilaterally x3days.   Swelling initially but has since improved   PMH OA bilateral knees, previously received injections     HTN- losartan 100mg, toprol 25mg, amlodipine 10mg, HCTZ 25mg   BP has been elevated at home 150-160/60-70s   HLD- on statin therapy   DM- A1c 5.5%, metformin 1000mg bid   Blood sugar dropped in the past few days (50s), highest readings 120   Potassium 10mEq     Due for labs     Present with sister at today's appt     Last visit June 2023 with PCP.      Review of Systems   Constitutional:  Negative for chills, diaphoresis and fever.   HENT:  Negative for congestion, rhinorrhea and sore throat.    Respiratory:  Negative for cough, shortness of breath and wheezing.    Gastrointestinal:  Negative for abdominal pain, constipation, diarrhea, nausea and vomiting.   Musculoskeletal:  Positive for arthralgias and joint swelling.   Skin:  Negative for rash.   Neurological:  Negative for dizziness, light-headedness and headaches.       Objective:   BP (!) 146/76 (BP Location: Right arm, Patient Position: Sitting, BP Method: Large (Manual))   Pulse 68   Temp 97.6 °F (36.4 °C) (Tympanic)   Resp 20   Wt 90.1 kg (198 lb 10.2 oz)   SpO2 97%   BMI 34.10 kg/m²   Physical Exam  Constitutional:       General: She is not in acute distress.     Appearance: Normal appearance. She is well-developed. She is not ill-appearing.   HENT:      Head: Normocephalic and atraumatic.   Cardiovascular:      Rate and Rhythm: Normal rate and regular rhythm.      Heart sounds: Normal heart sounds. No murmur heard.  Pulmonary:      Effort: Pulmonary effort is normal. No respiratory distress.      Breath sounds: Normal breath  sounds. No decreased breath sounds.   Musculoskeletal:      Right knee: Crepitus present. No swelling or bony tenderness. Normal range of motion.      Left knee: Crepitus present. No swelling or bony tenderness. Normal range of motion.      Right lower leg: No edema.      Left lower leg: No edema.   Skin:     General: Skin is warm and dry.      Findings: No rash.   Psychiatric:         Speech: Speech normal.         Behavior: Behavior normal.         Thought Content: Thought content normal.       Assessment:      1. Hypertension goal BP (blood pressure) < 130/80    2. Controlled type 2 diabetes mellitus with microalbuminuria, without long-term current use of insulin    3. Hyperlipidemia LDL goal <70    4. Primary osteoarthritis of left knee    5. Primary osteoarthritis of right knee    6. Age-related osteoporosis without current pathological fracture    7. Encounter for screening mammogram for malignant neoplasm of breast       Plan:   Hypertension goal BP (blood pressure) < 130/80  -     CBC Auto Differential; Future; Expected date: 06/21/2024  -     Comprehensive Metabolic Panel; Future; Expected date: 06/21/2024  -     losartan (COZAAR) 100 MG tablet; Take 1 tablet (100 mg total) by mouth once daily.  Dispense: 90 tablet; Refill: 1  -     metoprolol succinate (TOPROL-XL) 25 MG 24 hr tablet; Take 1 tablet (25 mg total) by mouth once daily.  Dispense: 90 tablet; Refill: 1  -     amLODIPine (NORVASC) 10 MG tablet; Take 1 tablet (10 mg total) by mouth once daily.  Dispense: 90 tablet; Refill: 1  -     hydroCHLOROthiazide (HYDRODIURIL) 25 MG tablet; Take 1 tablet (25 mg total) by mouth once daily.  Dispense: 90 tablet; Refill: 1    Controlled type 2 diabetes mellitus with microalbuminuria, without long-term current use of insulin  -     Hemoglobin A1C; Future; Expected date: 06/21/2024  -     Microalbumin/Creatinine Ratio, Urine; Future; Expected date: 06/21/2024  -     losartan (COZAAR) 100 MG tablet; Take 1 tablet  (100 mg total) by mouth once daily.  Dispense: 90 tablet; Refill: 1  -     metFORMIN (GLUCOPHAGE-XR) 500 MG ER 24hr tablet; Take 1 tablet (500 mg total) by mouth 2 (two) times daily with meals.  Dispense: 180 tablet; Refill: 1  -     Hemoglobin A1C; Future; Expected date: 06/21/2024    Hyperlipidemia LDL goal <70  -     Lipid Panel; Future; Expected date: 06/21/2024  -     pravastatin (PRAVACHOL) 40 MG tablet; Take 1 tablet (40 mg total) by mouth once daily.  Dispense: 90 tablet; Refill: 1    Primary osteoarthritis of left knee  -     diclofenac sodium (VOLTAREN) 1 % Gel; Apply 2 g topically 4 (four) times daily. for 10 days  Dispense: 200 g; Refill: 1  -     Ambulatory referral/consult to Orthopedics; Future; Expected date: 06/28/2024    Primary osteoarthritis of right knee  -     diclofenac sodium (VOLTAREN) 1 % Gel; Apply 2 g topically 4 (four) times daily. for 10 days  Dispense: 200 g; Refill: 1  -     Ambulatory referral/consult to Orthopedics; Future; Expected date: 06/28/2024    Age-related osteoporosis without current pathological fracture  -     alendronate (FOSAMAX) 70 MG tablet; Take 1 tablet (70 mg total) by mouth once a week.  Dispense: 12 tablet; Refill: 1    Encounter for screening mammogram for malignant neoplasm of breast  -     Mammo Digital Screening Bilat w/ Alcides; Future; Expected date: 06/21/2024      A1c well controlled previously, decrease metformin to 500mg bid d/t low sugar readings recently   Repeat A1c in 3mths     Fasting labs     Mammo and eye appt to be scheduled     6mth f/u PCP

## 2024-06-24 ENCOUNTER — TELEPHONE (OUTPATIENT)
Dept: INTERNAL MEDICINE | Facility: CLINIC | Age: 69
End: 2024-06-24
Payer: COMMERCIAL

## 2024-06-24 NOTE — TELEPHONE ENCOUNTER
----- Message from Viv Dior PA-C sent at 6/24/2024  9:45 AM CDT -----  Please advise patient:  Kidney function decreased slightly, ensure adequate hydration and avoid NSAIDs   Diabetes well controlled, can continue with decreased metformin dose we discussed last visit   Mild, stable anemia   Cholesterol within acceptable range   Follow up with Dr. Guzman next month as scheduled

## 2024-07-05 DIAGNOSIS — M25.562 PAIN IN BOTH KNEES, UNSPECIFIED CHRONICITY: Primary | ICD-10-CM

## 2024-07-05 DIAGNOSIS — M25.561 PAIN IN BOTH KNEES, UNSPECIFIED CHRONICITY: Primary | ICD-10-CM

## 2024-07-19 DIAGNOSIS — E11.29 CONTROLLED TYPE 2 DIABETES MELLITUS WITH MICROALBUMINURIA, WITHOUT LONG-TERM CURRENT USE OF INSULIN: ICD-10-CM

## 2024-07-19 DIAGNOSIS — R80.9 CONTROLLED TYPE 2 DIABETES MELLITUS WITH MICROALBUMINURIA, WITHOUT LONG-TERM CURRENT USE OF INSULIN: ICD-10-CM

## 2024-07-19 RX ORDER — METFORMIN HYDROCHLORIDE 1000 MG/1
TABLET ORAL
Qty: 180 TABLET | Refills: 0 | OUTPATIENT
Start: 2024-07-19

## 2024-07-19 NOTE — TELEPHONE ENCOUNTER
No care due was identified.  [facility] Embedded Care Due Messages. Reference number: [id number].   7/19/2024 9:29:34 AM CDT

## 2024-07-19 NOTE — TELEPHONE ENCOUNTER
Refill Decision Note   Carisa Patricio  is requesting a refill authorization.  Brief Assessment and Rationale for Refill:  Quick Discontinue     Medication Therapy Plan:  pt taking metFORMIN (GLUCOPHAGE-XR) 500 MG      Comments:     Note composed:11:02 AM 07/19/2024

## 2024-07-30 ENCOUNTER — OFFICE VISIT (OUTPATIENT)
Dept: INTERNAL MEDICINE | Facility: CLINIC | Age: 69
End: 2024-07-30
Payer: COMMERCIAL

## 2024-07-30 ENCOUNTER — HOSPITAL ENCOUNTER (OUTPATIENT)
Dept: RADIOLOGY | Facility: HOSPITAL | Age: 69
Discharge: HOME | End: 2024-07-30
Attending: PHYSICIAN ASSISTANT
Payer: COMMERCIAL

## 2024-07-30 VITALS
WEIGHT: 189.63 LBS | SYSTOLIC BLOOD PRESSURE: 120 MMHG | TEMPERATURE: 97 F | DIASTOLIC BLOOD PRESSURE: 70 MMHG | BODY MASS INDEX: 32.37 KG/M2 | HEIGHT: 64 IN | RESPIRATION RATE: 20 BRPM | OXYGEN SATURATION: 100 % | HEART RATE: 72 BPM

## 2024-07-30 DIAGNOSIS — F20.9 SCHIZOPHRENIA, UNSPECIFIED TYPE: ICD-10-CM

## 2024-07-30 DIAGNOSIS — Z12.11 COLON CANCER SCREENING: ICD-10-CM

## 2024-07-30 DIAGNOSIS — I10 HYPERTENSION GOAL BP (BLOOD PRESSURE) < 130/80: Primary | Chronic | ICD-10-CM

## 2024-07-30 DIAGNOSIS — E11.29 CONTROLLED TYPE 2 DIABETES MELLITUS WITH MICROALBUMINURIA, WITHOUT LONG-TERM CURRENT USE OF INSULIN: Chronic | ICD-10-CM

## 2024-07-30 DIAGNOSIS — D64.9 NORMOCYTIC ANEMIA: ICD-10-CM

## 2024-07-30 DIAGNOSIS — E78.5 HYPERLIPIDEMIA LDL GOAL <70: ICD-10-CM

## 2024-07-30 DIAGNOSIS — R80.9 CONTROLLED TYPE 2 DIABETES MELLITUS WITH MICROALBUMINURIA, WITHOUT LONG-TERM CURRENT USE OF INSULIN: Chronic | ICD-10-CM

## 2024-07-30 DIAGNOSIS — N18.32 STAGE 3B CHRONIC KIDNEY DISEASE: ICD-10-CM

## 2024-07-30 DIAGNOSIS — E66.9 OBESITY (BMI 30.0-34.9): ICD-10-CM

## 2024-07-30 DIAGNOSIS — Z12.31 ENCOUNTER FOR SCREENING MAMMOGRAM FOR MALIGNANT NEOPLASM OF BREAST: ICD-10-CM

## 2024-07-30 PROCEDURE — 77067 SCR MAMMO BI INCL CAD: CPT | Mod: TC

## 2024-07-30 PROCEDURE — 1126F AMNT PAIN NOTED NONE PRSNT: CPT | Mod: CPTII,S$GLB,, | Performed by: INTERNAL MEDICINE

## 2024-07-30 PROCEDURE — 1160F RVW MEDS BY RX/DR IN RCRD: CPT | Mod: CPTII,S$GLB,, | Performed by: INTERNAL MEDICINE

## 2024-07-30 PROCEDURE — 3066F NEPHROPATHY DOC TX: CPT | Mod: CPTII,S$GLB,, | Performed by: INTERNAL MEDICINE

## 2024-07-30 PROCEDURE — 99999 PR PBB SHADOW E&M-EST. PATIENT-LVL V: CPT | Mod: PBBFAC,,, | Performed by: INTERNAL MEDICINE

## 2024-07-30 PROCEDURE — 3044F HG A1C LEVEL LT 7.0%: CPT | Mod: CPTII,S$GLB,, | Performed by: INTERNAL MEDICINE

## 2024-07-30 PROCEDURE — 3074F SYST BP LT 130 MM HG: CPT | Mod: CPTII,S$GLB,, | Performed by: INTERNAL MEDICINE

## 2024-07-30 PROCEDURE — 3078F DIAST BP <80 MM HG: CPT | Mod: CPTII,S$GLB,, | Performed by: INTERNAL MEDICINE

## 2024-07-30 PROCEDURE — 77067 SCR MAMMO BI INCL CAD: CPT | Mod: 26,,, | Performed by: RADIOLOGY

## 2024-07-30 PROCEDURE — 1101F PT FALLS ASSESS-DOCD LE1/YR: CPT | Mod: CPTII,S$GLB,, | Performed by: INTERNAL MEDICINE

## 2024-07-30 PROCEDURE — 3008F BODY MASS INDEX DOCD: CPT | Mod: CPTII,S$GLB,, | Performed by: INTERNAL MEDICINE

## 2024-07-30 PROCEDURE — 4010F ACE/ARB THERAPY RXD/TAKEN: CPT | Mod: CPTII,S$GLB,, | Performed by: INTERNAL MEDICINE

## 2024-07-30 PROCEDURE — 99214 OFFICE O/P EST MOD 30 MIN: CPT | Mod: S$GLB,,, | Performed by: INTERNAL MEDICINE

## 2024-07-30 PROCEDURE — G2211 COMPLEX E/M VISIT ADD ON: HCPCS | Mod: S$GLB,,, | Performed by: INTERNAL MEDICINE

## 2024-07-30 PROCEDURE — 3288F FALL RISK ASSESSMENT DOCD: CPT | Mod: CPTII,S$GLB,, | Performed by: INTERNAL MEDICINE

## 2024-07-30 PROCEDURE — 77063 BREAST TOMOSYNTHESIS BI: CPT | Mod: 26,,, | Performed by: RADIOLOGY

## 2024-07-30 PROCEDURE — 1159F MED LIST DOCD IN RCRD: CPT | Mod: CPTII,S$GLB,, | Performed by: INTERNAL MEDICINE

## 2024-07-30 PROCEDURE — 3061F NEG MICROALBUMINURIA REV: CPT | Mod: CPTII,S$GLB,, | Performed by: INTERNAL MEDICINE

## 2024-07-30 PROCEDURE — 77063 BREAST TOMOSYNTHESIS BI: CPT | Mod: TC

## 2024-07-30 NOTE — PROGRESS NOTES
Carisa Curry  69 y.o. Black or  female  [id number]    Chief Complaint:  Chief Complaint   Patient presents with    Follow-up       History of Present Illness:  Presents to the clinic with her sister.   HTN--stable.   DM--well controlled.   Microalbuminuria/CKD III--denies taking NSAID's. Denies symptoms.   HLD--stable on pravastatin.   Anemia--denies symptoms. Denies blood loss. Due for colon cancer screening.   Schizophrenia--management per psychiatry.     Laboratory:  Lab Results   Component Value Date    WBC 6.72 06/21/2024    HGB 11.0 (L) 06/21/2024    HCT 32.9 (L) 06/21/2024     06/21/2024    CHOL 136 06/21/2024    TRIG 60 06/21/2024    HDL 56 06/21/2024    ALT 6 (L) 06/21/2024    AST 14 06/21/2024     (L) 06/21/2024    K 4.0 06/21/2024     06/21/2024    CREATININE 1.3 06/21/2024    BUN 21 06/21/2024    CO2 23 06/21/2024    TSH 0.721 07/01/2015    HGBA1C 5.2 06/21/2024     Lab Results   Component Value Date    LDLCALC 68.0 06/21/2024     History:  Past Medical History:   Diagnosis Date    Arthritis     CHF (congestive heart failure)     Diabetes mellitus, type 2     Diabetic retinopathy     Hyperlipidemia     Hypertension     Schizophrenia     Seizures     reported per pt and sister, nothing since childhood       Medications:  Current Outpatient Medications on File Prior to Visit   Medication Sig Dispense Refill    alendronate (FOSAMAX) 70 MG tablet Take 1 tablet (70 mg total) by mouth once a week. 12 tablet 1    amLODIPine (NORVASC) 10 MG tablet Take 1 tablet (10 mg total) by mouth once daily. 90 tablet 1    ARIPiprazole (ABILIFY) 10 MG Tab Take 1 tablet (10 mg total) by mouth once daily. 30 tablet 1    benztropine (COGENTIN) 0.5 MG tablet Take 1 tablet (0.5 mg total) by mouth 2 (two) times daily as needed. 60 tablet 3    benztropine (COGENTIN) 1 MG tablet Take 1/2 to 1 tablet twice daily as needed for side effects. 60 tablet 5    blood sugar diagnostic Strp To check BG 2  "times daily, to use with insurance preferred meter 100 strip 0    blood-glucose meter kit To check BG 2 times daily, to use with insurance preferred meter 1 each 0    cholecalciferol, vitamin D3, (VITAMIN D3) 25 mcg (1,000 unit) capsule Take 1 capsule (1,000 Units total) by mouth once daily. 30 capsule 11    diclofenac sodium (VOLTAREN) 1 % Gel Apply 2 g topically 4 (four) times daily. for 10 days 200 g 1    FREESTYLE LANCETS 28 gauge lancets Apply topically once daily.      hydroCHLOROthiazide (HYDRODIURIL) 25 MG tablet Take 1 tablet (25 mg total) by mouth once daily. 90 tablet 1    insulin needles, disposable, (PEN NEEDLE) 31 X 5/16 " Ndle Once a day insulin injection. 30 each 6    lancets Misc To check BG 2 times daily, to use with insurance preferred meter 100 each 0    losartan (COZAAR) 100 MG tablet Take 1 tablet (100 mg total) by mouth once daily. 90 tablet 1    metFORMIN (GLUCOPHAGE-XR) 500 MG ER 24hr tablet Take 1 tablet (500 mg total) by mouth 2 (two) times daily with meals. 180 tablet 1    metoprolol succinate (TOPROL-XL) 25 MG 24 hr tablet Take 1 tablet (25 mg total) by mouth once daily. 90 tablet 1    nystatin (MYCOSTATIN) powder APPLY TOPICALLY 4(FOUR) TIMES DAILY 60 g 0    potassium chloride SA (K-DUR,KLOR-CON M) 10 MEQ tablet TAKE ONE TABLET BY MOUTH ONE TIME DAILY 90 tablet 1    pravastatin (PRAVACHOL) 40 MG tablet Take 1 tablet (40 mg total) by mouth once daily. 90 tablet 1    traZODone (DESYREL) 100 MG tablet Take 1/2 to 1 tablet at bedtime as needed for sleep. 30 tablet 3     No current facility-administered medications on file prior to visit.       Allergies:  Review of patient's allergies indicates:  No Known Allergies    Review of Systems   Respiratory:  Negative for shortness of breath.    Cardiovascular:  Negative for chest pain and leg swelling.   Gastrointestinal:  Negative for abdominal pain, blood in stool, constipation and diarrhea.   Genitourinary:  Negative for dysuria. "   Neurological:  Negative for dizziness and headaches.       Exam:  Vitals:    07/30/24 1011   BP: 120/70   Pulse:    Resp:    Temp:      Weight: 86 kg (189 lb 9.5 oz)   Body mass index is 32.54 kg/m².      Physical Exam  Vitals reviewed.   Constitutional:       General: She is not in acute distress.     Appearance: She is well-developed. She is not ill-appearing.   Eyes:      General: No scleral icterus.  Cardiovascular:      Rate and Rhythm: Normal rate and regular rhythm.      Pulses:           Dorsalis pedis pulses are 2+ on the right side and 2+ on the left side.      Heart sounds: Normal heart sounds.   Pulmonary:      Effort: Pulmonary effort is normal. No respiratory distress.      Breath sounds: Normal breath sounds.   Musculoskeletal:      Right lower leg: No edema.      Left lower leg: No edema.   Feet:      Right foot:      Protective Sensation: 5 sites tested.  5 sites sensed.      Skin integrity: No ulcer.      Left foot:      Protective Sensation: 5 sites tested.  5 sites sensed.      Skin integrity: No ulcer.   Skin:     General: Skin is warm and dry.   Neurological:      Mental Status: She is alert and oriented to person, place, and time.   Psychiatric:         Behavior: Behavior normal.       Assessment:  The primary encounter diagnosis was Hypertension goal BP (blood pressure) < 130/80. Diagnoses of Controlled type 2 diabetes mellitus with microalbuminuria, without long-term current use of insulin, Stage 3b chronic kidney disease, Hyperlipidemia LDL goal <70, Normocytic anemia, Schizophrenia, unspecified type, Obesity (BMI 30.0-34.9), and Colon cancer screening were also pertinent to this visit.    Plan:  Hypertension goal BP (blood pressure) < 130/80  -     continue losartan, amlodipine, HCTZ and metoprolol    Controlled type 2 diabetes mellitus with microalbuminuria, without long-term current use of insulin  -     continue metformin  -     continue losartan    Stage 3b chronic kidney disease  -      continue losartan  -     advised to avoid NSAID's    Hyperlipidemia LDL goal <70  -     continue pravastatin    Normocytic anemia  -     Iron and TIBC; Future; Expected date: 07/30/2024  -     CBC Auto Differential; Future; Expected date: 07/30/2024  -     Ferritin; Future; Expected date: 07/30/2024    Schizophrenia, unspecified type  -     f/u with psychiatry    Obesity (BMI 30.0-34.9)  -     Lifestyle modifications discussed     Colon cancer screening  -     Fecal Immunochemical Test (iFOBT); Future; Expected date: 07/30/2024    Follow up in about 6 months (around 1/30/2025).         - - -

## 2024-08-15 DIAGNOSIS — E87.6 DIURETIC-INDUCED HYPOKALEMIA: ICD-10-CM

## 2024-08-15 DIAGNOSIS — T50.2X5A DIURETIC-INDUCED HYPOKALEMIA: ICD-10-CM

## 2024-08-15 RX ORDER — POTASSIUM CHLORIDE 750 MG/1
10 TABLET, EXTENDED RELEASE ORAL
Qty: 90 TABLET | Refills: 3 | Status: SHIPPED | OUTPATIENT
Start: 2024-08-15

## 2024-08-15 NOTE — TELEPHONE ENCOUNTER
No care due was identified.  [facility] Embedded Care Due Messages. Reference number: [id number].   8/15/2024 3:00:53 AM CDT   15-Isaias-2017

## 2024-08-15 NOTE — TELEPHONE ENCOUNTER
Refill Decision Note   Carisa Curry  is requesting a refill authorization.  Brief Assessment and Rationale for Refill:  Approve     Medication Therapy Plan:         Comments:     Note composed:10:02 AM 08/15/2024

## 2024-09-25 RX ORDER — ARIPIPRAZOLE 10 MG/1
10 TABLET ORAL DAILY
Qty: 30 TABLET | Refills: 1 | Status: SHIPPED | OUTPATIENT
Start: 2024-09-25

## 2024-10-25 NOTE — TELEPHONE ENCOUNTER
No care due was identified.  [facility] Embedded Care Due Messages. Reference number: [id number].   10/25/2024 8:02:30 AM CDT

## 2024-10-26 NOTE — TELEPHONE ENCOUNTER
Refill Decision Note   Carisa Curry  is requesting a refill authorization.  Brief Assessment and Rationale for Refill:  Approve     Medication Therapy Plan:       Medication Reconciliation Completed: No   Comments:     No Care Gaps recommended.     Note composed:10:01 AM 10/26/2024

## 2024-11-08 NOTE — LETTER
October 25, 2019      Guadalupe Guzman DO  [address] [place] [address] [address] [id number]           [facility]  [address] [place] [address] [address] [id number]  Phone: [phone]  Fax: [phone]          Patient: Carisa Curry   MR Number: [id number]   YOB: 1955   Date of Visit: 10/25/2019       Dear Dr. Guadalupe Guzman:    Thank you for referring Carisa Curry to me for evaluation. Attached you will find relevant portions of my assessment and plan of care.    If you have questions, please do not hesitate to call me. I look forward to following Carisa Curry along with you.    Sincerely,    Radha Valencia PA-C    Enclosure  CC:  No Recipients    If you would like to receive this communication electronically, please contact [email or web address] or [phone] to request more information on [employer] access.    For providers and/or their staff who would like to refer a patient to [facility], please contact us through our one-stop-shop provider referral line, Nimo Pabon, at [phone].    If you feel you have received this communication in error or would no longer like to receive these types of communications, please e-mail [email or web address]          clear

## 2024-12-13 ENCOUNTER — OFFICE VISIT (OUTPATIENT)
Dept: INTERNAL MEDICINE | Facility: CLINIC | Age: 69
End: 2024-12-13
Payer: COMMERCIAL

## 2024-12-13 ENCOUNTER — HOSPITAL ENCOUNTER (INPATIENT)
Facility: HOSPITAL | Age: 69
LOS: 2 days | Discharge: HOME | DRG: 092 | End: 2024-12-15
Attending: EMERGENCY MEDICINE | Admitting: HOSPITALIST
Payer: COMMERCIAL

## 2024-12-13 VITALS
WEIGHT: 189.63 LBS | RESPIRATION RATE: 16 BRPM | TEMPERATURE: 97 F | HEART RATE: 68 BPM | DIASTOLIC BLOOD PRESSURE: 86 MMHG | SYSTOLIC BLOOD PRESSURE: 138 MMHG | BODY MASS INDEX: 32.54 KG/M2

## 2024-12-13 DIAGNOSIS — R80.9 CONTROLLED TYPE 2 DIABETES MELLITUS WITH MICROALBUMINURIA, WITHOUT LONG-TERM CURRENT USE OF INSULIN: ICD-10-CM

## 2024-12-13 DIAGNOSIS — I63.9 CVA (CEREBRAL VASCULAR ACCIDENT): ICD-10-CM

## 2024-12-13 DIAGNOSIS — N17.9 ACUTE RENAL FAILURE, UNSPECIFIED ACUTE RENAL FAILURE TYPE: Primary | ICD-10-CM

## 2024-12-13 DIAGNOSIS — R07.9 CHEST PAIN: ICD-10-CM

## 2024-12-13 DIAGNOSIS — R41.82 ALTERED MENTAL STATUS: ICD-10-CM

## 2024-12-13 DIAGNOSIS — E11.29 CONTROLLED TYPE 2 DIABETES MELLITUS WITH MICROALBUMINURIA, WITHOUT LONG-TERM CURRENT USE OF INSULIN: Chronic | ICD-10-CM

## 2024-12-13 DIAGNOSIS — E11.29 CONTROLLED TYPE 2 DIABETES MELLITUS WITH MICROALBUMINURIA, WITHOUT LONG-TERM CURRENT USE OF INSULIN: ICD-10-CM

## 2024-12-13 DIAGNOSIS — F20.9 SCHIZOPHRENIA, UNSPECIFIED TYPE: ICD-10-CM

## 2024-12-13 DIAGNOSIS — E78.5 HYPERLIPIDEMIA LDL GOAL <70: Chronic | ICD-10-CM

## 2024-12-13 DIAGNOSIS — I10 HYPERTENSION GOAL BP (BLOOD PRESSURE) < 130/80: ICD-10-CM

## 2024-12-13 DIAGNOSIS — R80.9 CONTROLLED TYPE 2 DIABETES MELLITUS WITH MICROALBUMINURIA, WITHOUT LONG-TERM CURRENT USE OF INSULIN: Chronic | ICD-10-CM

## 2024-12-13 DIAGNOSIS — R29.818 ACUTE FOCAL NEUROLOGICAL DEFICIT: Primary | ICD-10-CM

## 2024-12-13 LAB
ALBUMIN SERPL BCP-MCNC: 3.7 G/DL (ref 3.5–5.2)
ALP SERPL-CCNC: 105 U/L (ref 40–150)
ALT SERPL W/O P-5'-P-CCNC: 9 U/L (ref 10–44)
AMPHET+METHAMPHET UR QL: NEGATIVE
ANION GAP SERPL CALC-SCNC: 15 MMOL/L (ref 8–16)
AST SERPL-CCNC: 25 U/L (ref 10–40)
BARBITURATES UR QL SCN>200 NG/ML: NEGATIVE
BASOPHILS # BLD AUTO: 0.05 K/UL (ref 0–0.2)
BASOPHILS NFR BLD: 0.7 % (ref 0–1.9)
BENZODIAZ UR QL SCN>200 NG/ML: NEGATIVE
BILIRUB SERPL-MCNC: 0.4 MG/DL (ref 0.1–1)
BILIRUB UR QL STRIP: NEGATIVE
BUN SERPL-MCNC: 36 MG/DL (ref 8–23)
BZE UR QL SCN: NEGATIVE
CALCIUM SERPL-MCNC: 9.6 MG/DL (ref 8.7–10.5)
CANNABINOIDS UR QL SCN: NEGATIVE
CHLORIDE SERPL-SCNC: 98 MMOL/L (ref 95–110)
CLARITY UR: CLEAR
CO2 SERPL-SCNC: 21 MMOL/L (ref 23–29)
COLOR UR: YELLOW
CREAT SERPL-MCNC: 2.5 MG/DL (ref 0.5–1.4)
CREAT UR-MCNC: 266.1 MG/DL (ref 15–325)
DIFFERENTIAL METHOD BLD: ABNORMAL
EOSINOPHIL # BLD AUTO: 0.1 K/UL (ref 0–0.5)
EOSINOPHIL NFR BLD: 1.4 % (ref 0–8)
ERYTHROCYTE [DISTWIDTH] IN BLOOD BY AUTOMATED COUNT: 14.3 % (ref 11.5–14.5)
EST. GFR  (NO RACE VARIABLE): 20 ML/MIN/1.73 M^2
GLUCOSE SERPL-MCNC: 99 MG/DL (ref 70–110)
GLUCOSE UR QL STRIP: NEGATIVE
HCT VFR BLD AUTO: 31.4 % (ref 37–48.5)
HGB BLD-MCNC: 10.4 G/DL (ref 12–16)
HGB UR QL STRIP: NEGATIVE
IMM GRANULOCYTES # BLD AUTO: 0.02 K/UL (ref 0–0.04)
IMM GRANULOCYTES NFR BLD AUTO: 0.3 % (ref 0–0.5)
KETONES UR QL STRIP: NEGATIVE
LACTATE SERPL-SCNC: 1.9 MMOL/L (ref 0.5–2.2)
LEUKOCYTE ESTERASE UR QL STRIP: NEGATIVE
LIPASE SERPL-CCNC: 41 U/L (ref 4–60)
LYMPHOCYTES # BLD AUTO: 1.3 K/UL (ref 1–4.8)
LYMPHOCYTES NFR BLD: 17.9 % (ref 18–48)
MCH RBC QN AUTO: 27.4 PG (ref 27–31)
MCHC RBC AUTO-ENTMCNC: 33.1 G/DL (ref 32–36)
MCV RBC AUTO: 83 FL (ref 82–98)
METHADONE UR QL SCN>300 NG/ML: NEGATIVE
MONOCYTES # BLD AUTO: 0.8 K/UL (ref 0.3–1)
MONOCYTES NFR BLD: 10.4 % (ref 4–15)
NEUTROPHILS # BLD AUTO: 5.1 K/UL (ref 1.8–7.7)
NEUTROPHILS NFR BLD: 69.3 % (ref 38–73)
NITRITE UR QL STRIP: NEGATIVE
NRBC BLD-RTO: 0 /100 WBC
OPIATES UR QL SCN: NEGATIVE
PCP UR QL SCN>25 NG/ML: NEGATIVE
PH UR STRIP: 5 [PH] (ref 5–8)
PLATELET # BLD AUTO: 315 K/UL (ref 150–450)
PMV BLD AUTO: 10.9 FL (ref 9.2–12.9)
POCT GLUCOSE: 109 MG/DL (ref 70–110)
POCT GLUCOSE: 76 MG/DL (ref 70–110)
POCT GLUCOSE: 82 MG/DL (ref 70–110)
POTASSIUM SERPL-SCNC: 4.2 MMOL/L (ref 3.5–5.1)
PROT SERPL-MCNC: 8.4 G/DL (ref 6–8.4)
PROT UR QL STRIP: ABNORMAL
RBC # BLD AUTO: 3.8 M/UL (ref 4–5.4)
SODIUM SERPL-SCNC: 134 MMOL/L (ref 136–145)
SP GR UR STRIP: 1.02 (ref 1–1.03)
TOXICOLOGY INFORMATION: NORMAL
TROPONIN I SERPL DL<=0.01 NG/ML-MCNC: 0.02 NG/ML (ref 0–0.03)
TSH SERPL DL<=0.005 MIU/L-ACNC: 1.1 UIU/ML (ref 0.4–4)
URN SPEC COLLECT METH UR: ABNORMAL
UROBILINOGEN UR STRIP-ACNC: ABNORMAL EU/DL
WBC # BLD AUTO: 7.31 K/UL (ref 3.9–12.7)

## 2024-12-13 PROCEDURE — 84443 ASSAY THYROID STIM HORMONE: CPT | Performed by: EMERGENCY MEDICINE

## 2024-12-13 PROCEDURE — 99285 EMERGENCY DEPT VISIT HI MDM: CPT | Mod: 25

## 2024-12-13 PROCEDURE — 80307 DRUG TEST PRSMV CHEM ANLYZR: CPT | Performed by: EMERGENCY MEDICINE

## 2024-12-13 PROCEDURE — 83605 ASSAY OF LACTIC ACID: CPT | Performed by: EMERGENCY MEDICINE

## 2024-12-13 PROCEDURE — 93010 ELECTROCARDIOGRAM REPORT: CPT | Mod: ,,, | Performed by: INTERNAL MEDICINE

## 2024-12-13 PROCEDURE — 25000003 PHARM REV CODE 250: Performed by: EMERGENCY MEDICINE

## 2024-12-13 PROCEDURE — 99999 PR PBB SHADOW E&M-EST. PATIENT-LVL IV: CPT | Mod: PBBFAC,,,

## 2024-12-13 PROCEDURE — 84484 ASSAY OF TROPONIN QUANT: CPT | Performed by: EMERGENCY MEDICINE

## 2024-12-13 PROCEDURE — 83690 ASSAY OF LIPASE: CPT | Performed by: EMERGENCY MEDICINE

## 2024-12-13 PROCEDURE — 93005 ELECTROCARDIOGRAM TRACING: CPT

## 2024-12-13 PROCEDURE — 81003 URINALYSIS AUTO W/O SCOPE: CPT | Performed by: EMERGENCY MEDICINE

## 2024-12-13 PROCEDURE — 21400001 HC TELEMETRY ROOM

## 2024-12-13 PROCEDURE — 85025 COMPLETE CBC W/AUTO DIFF WBC: CPT | Performed by: EMERGENCY MEDICINE

## 2024-12-13 PROCEDURE — 80053 COMPREHEN METABOLIC PANEL: CPT | Performed by: EMERGENCY MEDICINE

## 2024-12-13 RX ORDER — ENOXAPARIN SODIUM 100 MG/ML
30 INJECTION SUBCUTANEOUS EVERY 24 HOURS
Status: DISCONTINUED | OUTPATIENT
Start: 2024-12-13 | End: 2024-12-15

## 2024-12-13 RX ORDER — ALUMINUM HYDROXIDE, MAGNESIUM HYDROXIDE, AND SIMETHICONE 1200; 120; 1200 MG/30ML; MG/30ML; MG/30ML
30 SUSPENSION ORAL 4 TIMES DAILY PRN
Status: DISCONTINUED | OUTPATIENT
Start: 2024-12-13 | End: 2024-12-15 | Stop reason: HOSPADM

## 2024-12-13 RX ORDER — GLUCAGON 1 MG
1 KIT INJECTION
Status: DISCONTINUED | OUTPATIENT
Start: 2024-12-13 | End: 2024-12-15 | Stop reason: HOSPADM

## 2024-12-13 RX ORDER — INSULIN ASPART 100 [IU]/ML
0-5 INJECTION, SOLUTION INTRAVENOUS; SUBCUTANEOUS
Status: DISCONTINUED | OUTPATIENT
Start: 2024-12-13 | End: 2024-12-15 | Stop reason: HOSPADM

## 2024-12-13 RX ORDER — PROMETHAZINE HYDROCHLORIDE 25 MG/1
25 TABLET ORAL EVERY 6 HOURS PRN
Status: DISCONTINUED | OUTPATIENT
Start: 2024-12-13 | End: 2024-12-15 | Stop reason: HOSPADM

## 2024-12-13 RX ORDER — ONDANSETRON HYDROCHLORIDE 2 MG/ML
4 INJECTION, SOLUTION INTRAVENOUS EVERY 8 HOURS PRN
Status: DISCONTINUED | OUTPATIENT
Start: 2024-12-13 | End: 2024-12-15 | Stop reason: HOSPADM

## 2024-12-13 RX ORDER — IBUPROFEN 200 MG
16 TABLET ORAL
Status: DISCONTINUED | OUTPATIENT
Start: 2024-12-13 | End: 2024-12-15 | Stop reason: HOSPADM

## 2024-12-13 RX ORDER — IPRATROPIUM BROMIDE AND ALBUTEROL SULFATE 2.5; .5 MG/3ML; MG/3ML
3 SOLUTION RESPIRATORY (INHALATION) EVERY 6 HOURS PRN
Status: DISCONTINUED | OUTPATIENT
Start: 2024-12-13 | End: 2024-12-15 | Stop reason: HOSPADM

## 2024-12-13 RX ORDER — TALC
6 POWDER (GRAM) TOPICAL NIGHTLY PRN
Status: DISCONTINUED | OUTPATIENT
Start: 2024-12-13 | End: 2024-12-15 | Stop reason: HOSPADM

## 2024-12-13 RX ORDER — ACETAMINOPHEN 650 MG/1
650 SUPPOSITORY RECTAL EVERY 6 HOURS PRN
Status: DISCONTINUED | OUTPATIENT
Start: 2024-12-13 | End: 2024-12-15 | Stop reason: HOSPADM

## 2024-12-13 RX ORDER — SODIUM CHLORIDE 0.9 % (FLUSH) 0.9 %
10 SYRINGE (ML) INJECTION EVERY 12 HOURS PRN
Status: DISCONTINUED | OUTPATIENT
Start: 2024-12-13 | End: 2024-12-15 | Stop reason: HOSPADM

## 2024-12-13 RX ORDER — LANCETS
EACH MISCELLANEOUS
Qty: 100 EACH | Refills: 3 | Status: ON HOLD | OUTPATIENT
Start: 2024-12-13

## 2024-12-13 RX ORDER — ACETAMINOPHEN 325 MG/1
650 TABLET ORAL EVERY 8 HOURS PRN
Status: DISCONTINUED | OUTPATIENT
Start: 2024-12-13 | End: 2024-12-15 | Stop reason: HOSPADM

## 2024-12-13 RX ORDER — NALOXONE HCL 0.4 MG/ML
0.02 VIAL (ML) INJECTION
Status: DISCONTINUED | OUTPATIENT
Start: 2024-12-13 | End: 2024-12-15 | Stop reason: HOSPADM

## 2024-12-13 RX ORDER — IBUPROFEN 200 MG
24 TABLET ORAL
Status: DISCONTINUED | OUTPATIENT
Start: 2024-12-13 | End: 2024-12-15 | Stop reason: HOSPADM

## 2024-12-13 RX ORDER — INSULIN PUMP SYRINGE, 3 ML
EACH MISCELLANEOUS
Qty: 1 EACH | Refills: 0 | Status: ON HOLD | OUTPATIENT
Start: 2024-12-13 | End: 2025-12-13

## 2024-12-13 RX ORDER — AMOXICILLIN 250 MG
1 CAPSULE ORAL 2 TIMES DAILY PRN
Status: DISCONTINUED | OUTPATIENT
Start: 2024-12-13 | End: 2024-12-15 | Stop reason: HOSPADM

## 2024-12-13 RX ADMIN — SODIUM CHLORIDE 1000 ML: 0.9 INJECTION, SOLUTION INTRAVENOUS at 06:12

## 2024-12-13 NOTE — ED PROVIDER NOTES
SCRIBE #1 NOTE: I, Fabio Barnes, am scribing for, and in the presence of, Juan Love Jr., MD. I have scribed the entire note.       History     Chief Complaint   Patient presents with    Altered Mental Status     Unable to walk and not talking x one day. Awake, alert. Follows simple commands.     Review of patient's allergies indicates:  No Known Allergies      History of Present Illness     HPI    Limited HPI and ROS due to pt's AMS    12/13/2024, 3:19 PM  History obtained from the pt's sister at bedside      History of Present Illness: Carisa Curry is a 69 y.o. female patient with a PMHx of HTN, CHF, HLD, schizophrenia, seizures, and DM type II who presents to the Emergency Department for evaluation of AMS which onset within the past day. Pt's daughter denies N/V; pt had an emesis bag in hand, which the pt's daughter states the pt often spits-up stuff. Symptoms are constant and moderate in severity. No mitigating or exacerbating factors reported. Patient denies any fever, chills, cough, and all other sxs at this time. No prior Tx. No further complaints or concerns at this time.       Arrival mode: Personal vehicle    PCP: Guadalupe Guzman DO        Past Medical History:  Past Medical History:   Diagnosis Date    Arthritis     CHF (congestive heart failure)     Diabetes mellitus, type 2     Diabetic retinopathy     Hyperlipidemia     Hypertension     Schizophrenia     Seizures     reported per pt and sister, nothing since childhood       Past Surgical History:  Past Surgical History:   Procedure Laterality Date    EYE SURGERY      INJECTION OF ANESTHETIC AGENT AROUND NERVE Bilateral 12/09/2019    Procedure: Bilateral Genicular nerve block (DIAGNOSTIC, NO STEROID) with RN IV sedation;  Surgeon: Butch Chao MD;  Location: [facility];  Service: Pain Management;  Laterality: Bilateral;    RADIOFREQUENCY THERMOCOAGULATION Right 12/23/2019    Procedure: Right Genicular Nerve RFA (COOLED) with RN IV  sedation;  Surgeon: Butch Chao MD;  Location: [facility];  Service: Pain Management;  Laterality: Right;    RADIOFREQUENCY THERMOCOAGULATION Left 01/06/2020    Procedure: Left Genicular Nerve RFA (COOLED) with RN IV sedation;  Surgeon: Butch Chao MD;  Location: [facility];  Service: Pain Management;  Laterality: Left;    SPINE SURGERY           Family History:  Family History   Problem Relation Name Age of Onset    Kidney disease Mother      Heart failure Mother      Cataracts Mother      Hypertension Mother      Heart disease Mother      Stroke Mother      Cancer Father          brain    Diabetes Father      Diabetes Maternal Aunt      Hypertension Maternal Aunt      Diabetes Paternal Aunt      Hypertension Paternal Aunt      Heart disease Paternal Aunt      Diabetes Paternal Uncle      Heart disease Paternal Uncle         Social History:  Social History     Tobacco Use    Smoking status: Former     Types: Cigarettes    Smokeless tobacco: Never   Substance and Sexual Activity    Alcohol use: No     Alcohol/week: 0.0 standard drinks of alcohol    Drug use: No    Sexual activity: Never        Review of Systems     Review of Systems   Unable to perform ROS: Mental status change   Constitutional:  Negative for chills and fever.   Respiratory:  Negative for cough.    Gastrointestinal:  Negative for nausea and vomiting.        Physical Exam     Initial Vitals   BP Pulse Resp Temp SpO2   12/13/24 1504 12/13/24 1504 12/13/24 1504 12/13/24 1504 12/13/24 1904   138/63 75 20 97.9 °F (36.6 °C) 100 %      MAP       --                 Physical Exam  Nursing Notes and Vital Signs Reviewed.  Constitutional: Patient is in no acute distress. Well-developed and well-nourished.  Patient does respond appropriately but is not speaking at this time which is atypical for her.  Head: Atraumatic. Normocephalic.  Eyes:  EOM intact.  No scleral icterus.  ENT: Mucous membranes are moist.  Nares clear 0 P is clear  Neck:   Full ROM. No JVD.  Cardiovascular: Regular rate. Regular rhythm No murmurs, rubs, or gallops. Distal pulses are 2+ and symmetric  Pulmonary/Chest: No respiratory distress. Clear to auscultation bilaterally. No wheezing or rales.  Equal chest wall rise bilaterally  Abdominal: Soft and non-distended.  There is no tenderness.  No rebound, guarding, or rigidity. Good bowel sounds.  Genitourinary: No CVA tenderness.  No suprapubic tenderness  Musculoskeletal: Moves all extremities. No obvious deformities.  5 x 5 strength in all extremities   Skin: Warm and dry.  Neurological:  Alert, awake, and appropriate.  Normal speech.  No acute focal neurological deficits are appreciated.  Two through 12 intact bilaterally.  Psychiatric: Normal affect. Good eye contact. Appropriate in content.       ED Course   Critical Care    Date/Time: 12/13/2024 6:54 PM    Performed by: Juan Love Jr., MD  Authorized by: Juan Love Jr., MD  Direct patient critical care time: 12 minutes  Additional history critical care time: 6 minutes  Ordering / reviewing critical care time: 7 minutes  Documentation critical care time: 9 minutes  Consulting other physicians critical care time: 4 minutes  Consult with family critical care time: 5 minutes  Total critical care time (exclusive of procedural time) : 43 minutes  Critical care time was exclusive of separately billable procedures and treating other patients and teaching time.  Critical care was necessary to treat or prevent imminent or life-threatening deterioration of the following conditions: CNS failure or compromise and renal failure.  Critical care was time spent personally by me on the following activities: development of treatment plan with patient or surrogate, discussions with consultants, interpretation of cardiac output measurements, evaluation of patient's response to treatment, examination of patient, obtaining history from patient or surrogate, ordering and performing  "treatments and interventions, ordering and review of laboratory studies, ordering and review of radiographic studies, pulse oximetry, re-evaluation of patient's condition and review of old charts.        ED Vital Signs:  Vitals:    12/13/24 1504 12/13/24 1904 12/13/24 1907   BP: 138/63 137/61    Pulse: 75 72    Resp: 20 16    Temp: 97.9 °F (36.6 °C)     TempSrc: Oral     SpO2:  100%    Weight: 83.9 kg (185 lb)  80.8 kg (178 lb 1.6 oz)   Height:   5' 2" (1.575 m)       Abnormal Lab Results:  Labs Reviewed   CBC W/ AUTO DIFFERENTIAL - Abnormal       Result Value    WBC 7.31      RBC 3.80 (*)     Hemoglobin 10.4 (*)     Hematocrit 31.4 (*)     MCV 83      MCH 27.4      MCHC 33.1      RDW 14.3      Platelets 315      MPV 10.9      Immature Granulocytes 0.3      Gran # (ANC) 5.1      Immature Grans (Abs) 0.02      Lymph # 1.3      Mono # 0.8      Eos # 0.1      Baso # 0.05      nRBC 0      Gran % 69.3      Lymph % 17.9 (*)     Mono % 10.4      Eosinophil % 1.4      Basophil % 0.7      Differential Method Automated     COMPREHENSIVE METABOLIC PANEL - Abnormal    Sodium 134 (*)     Potassium 4.2      Chloride 98      CO2 21 (*)     Glucose 99      BUN 36 (*)     Creatinine 2.5 (*)     Calcium 9.6      Total Protein 8.4      Albumin 3.7      Total Bilirubin 0.4      Alkaline Phosphatase 105      AST 25      ALT 9 (*)     eGFR 20 (*)     Anion Gap 15     URINALYSIS, REFLEX TO URINE CULTURE - Abnormal    Specimen UA Urine, Clean Catch      Color, UA Yellow      Appearance, UA Clear      pH, UA 5.0      Specific Gravity, UA 1.025      Protein, UA Trace (*)     Glucose, UA Negative      Ketones, UA Negative      Bilirubin (UA) Negative      Occult Blood UA Negative      Nitrite, UA Negative      Urobilinogen, UA 2.0-3.0 (*)     Leukocytes, UA Negative      Narrative:     Specimen Source->Urine   LIPASE    Lipase 41     TROPONIN I    Troponin I 0.023     TSH    TSH 1.099     LACTIC ACID, PLASMA    Lactate (Lactic Acid) 1.9   "   DRUG SCREEN PANEL, URINE EMERGENCY    Benzodiazepines Negative      Methadone metabolites Negative      Cocaine (Metab.) Negative      Opiate Scrn, Ur Negative      Barbiturate Screen, Ur Negative      Amphetamine Screen, Ur Negative      THC Negative      Phencyclidine Negative      Creatinine, Urine 266.1      Toxicology Information SEE COMMENT      Narrative:     Specimen Source->Urine   POCT GLUCOSE    POCT Glucose 109     POCT GLUCOSE    POCT Glucose 76          All Lab Results:  Results for orders placed or performed during the hospital encounter of 12/13/24   POCT glucose    Collection Time: 12/13/24  3:11 PM   Result Value Ref Range    POCT Glucose 109 70 - 110 mg/dL   CBC auto differential    Collection Time: 12/13/24  3:38 PM   Result Value Ref Range    WBC 7.31 3.90 - 12.70 K/uL    RBC 3.80 (L) 4.00 - 5.40 M/uL    Hemoglobin 10.4 (L) 12.0 - 16.0 g/dL    Hematocrit 31.4 (L) 37.0 - 48.5 %    MCV 83 82 - 98 fL    MCH 27.4 27.0 - 31.0 pg    MCHC 33.1 32.0 - 36.0 g/dL    RDW 14.3 11.5 - 14.5 %    Platelets 315 150 - 450 K/uL    MPV 10.9 9.2 - 12.9 fL    Immature Granulocytes 0.3 0.0 - 0.5 %    Gran # (ANC) 5.1 1.8 - 7.7 K/uL    Immature Grans (Abs) 0.02 0.00 - 0.04 K/uL    Lymph # 1.3 1.0 - 4.8 K/uL    Mono # 0.8 0.3 - 1.0 K/uL    Eos # 0.1 0.0 - 0.5 K/uL    Baso # 0.05 0.00 - 0.20 K/uL    nRBC 0 0 /100 WBC    Gran % 69.3 38.0 - 73.0 %    Lymph % 17.9 (L) 18.0 - 48.0 %    Mono % 10.4 4.0 - 15.0 %    Eosinophil % 1.4 0.0 - 8.0 %    Basophil % 0.7 0.0 - 1.9 %    Differential Method Automated    Comprehensive metabolic panel    Collection Time: 12/13/24  3:38 PM   Result Value Ref Range    Sodium 134 (L) 136 - 145 mmol/L    Potassium 4.2 3.5 - 5.1 mmol/L    Chloride 98 95 - 110 mmol/L    CO2 21 (L) 23 - 29 mmol/L    Glucose 99 70 - 110 mg/dL    BUN 36 (H) 8 - 23 mg/dL    Creatinine 2.5 (H) 0.5 - 1.4 mg/dL    Calcium 9.6 8.7 - 10.5 mg/dL    Total Protein 8.4 6.0 - 8.4 g/dL    Albumin 3.7 3.5 - 5.2 g/dL    Total  Bilirubin 0.4 0.1 - 1.0 mg/dL    Alkaline Phosphatase 105 40 - 150 U/L    AST 25 10 - 40 U/L    ALT 9 (L) 10 - 44 U/L    eGFR 20 (A) >60 mL/min/1.73 m^2    Anion Gap 15 8 - 16 mmol/L   Lipase    Collection Time: 12/13/24  3:38 PM   Result Value Ref Range    Lipase 41 4 - 60 U/L   Troponin I    Collection Time: 12/13/24  3:38 PM   Result Value Ref Range    Troponin I 0.023 0.000 - 0.026 ng/mL   TSH    Collection Time: 12/13/24  3:38 PM   Result Value Ref Range    TSH 1.099 0.400 - 4.000 uIU/mL   Lactic acid, plasma    Collection Time: 12/13/24  3:38 PM   Result Value Ref Range    Lactate (Lactic Acid) 1.9 0.5 - 2.2 mmol/L   POCT glucose    Collection Time: 12/13/24  6:04 PM   Result Value Ref Range    POCT Glucose 76 70 - 110 mg/dL   Urinalysis, Reflex to Urine Culture Urine, Clean Catch    Collection Time: 12/13/24  6:56 PM    Specimen: Urine   Result Value Ref Range    Specimen UA Urine, Clean Catch     Color, UA Yellow Yellow, Straw, Lucia    Appearance, UA Clear Clear    pH, UA 5.0 5.0 - 8.0    Specific Gravity, UA 1.025 1.005 - 1.030    Protein, UA Trace (A) Negative    Glucose, UA Negative Negative    Ketones, UA Negative Negative    Bilirubin (UA) Negative Negative    Occult Blood UA Negative Negative    Nitrite, UA Negative Negative    Urobilinogen, UA 2.0-3.0 (A) <2.0 EU/dL    Leukocytes, UA Negative Negative   Drug screen panel, in-house    Collection Time: 12/13/24  6:56 PM   Result Value Ref Range    Benzodiazepines Negative Negative    Methadone metabolites Negative Negative    Cocaine (Metab.) Negative Negative    Opiate Scrn, Ur Negative Negative    Barbiturate Screen, Ur Negative Negative    Amphetamine Screen, Ur Negative Negative    THC Negative Negative    Phencyclidine Negative Negative    Creatinine, Urine 266.1 15.0 - 325.0 mg/dL    Toxicology Information SEE COMMENT          Imaging Results:  Imaging Results              CT Head Without Contrast (Final result)  Result time 12/13/24 16:15:02       Final result by Cathy Olivares MD (12/13/24 16:15:02)                   Impression:      No acute finding identified      Electronically signed by: Cathy Olivares  Date:    12/13/2024  Time:    16:15               Narrative:    EXAMINATION:  CT HEAD WITHOUT CONTRAST    CLINICAL HISTORY:  Mental status change, unknown cause;    TECHNIQUE:  Low dose axial images were obtained through the head.  Coronal and sagittal reformations were also performed. Contrast was not administered.    COMPARISON:  None.    FINDINGS:  No displaced skull fracture.  Bilateral mastoid air cell effusion.  No acute intracranial hemorrhage or mass effect.  No hydrocephalus                                       X-Ray Chest AP Portable (Final result)  Result time 12/13/24 15:38:06      Final result by Carlos Millan MD (12/13/24 15:38:06)                   Impression:      No acute process seen.      Electronically signed by: Carlos Millan MD  Date:    12/13/2024  Time:    15:38               Narrative:    EXAMINATION:  XR CHEST AP PORTABLE    CLINICAL HISTORY:  ams;    FINDINGS:  Single view of the chest.  Comparison 06/02/2022    Cardiac silhouette is normal.  The lungs demonstrate no evidence of active disease.  No evidence of pleural effusion or pneumothorax.  Bones appear intact.  Moderate degenerative changes and moderate atherosclerotic disease.  Study is limited by poor inspiration                                       The EKG was ordered, reviewed, and independently interpreted by the ED provider.  Interpretation time: 15:46  Rate: 79 BPM  Rhythm: normal sinus rhythm  Interpretation: Moderate voltage criteria for LVH, may be normal variant (R in a VL, Butler product). No STEMI.           The Emergency Provider reviewed the vital signs and test results, which are outlined above.     ED Discussion       7:48 PM: Discussed case with Dr. Corrales ([facility]). Dr. Corrales agrees with current care and management of pt  and accepts admission.   Admitting Service:   Admitting Physician: Dr. Corrales  Admit to: Inpatient Med Tele      7:48 PM: Re-evaluated pt. I have discussed test results, shared treatment plan, and the need for admission with patient and family at bedside. Pt and family express understanding at this time and agree with all information. All questions answered. Pt and family have no further questions or concerns at this time. Pt is ready for admit.     ED Course as of 12/1955   Fri Dec 13, 2024   1708 Cardiac monitor interpretation  Independent interpretation  Indication: Altered mental status  Normal sinus rhythm.  Rate 87.  No STEMI [RT]      ED Course User Index  [RT] Juan Love Jr., MD     Medical Decision Making  Differential diagnosis: Altered mental status, UTI, pneumonia, electrolyte abnormality, metabolic disturbance, stroke    Patient is evaluated history physical examination.  Patient has a acute kidney injury secondary to dehydration with altered mental status and currently not speaking.  Light of her confusion that continues differential diagnosis includes a stroke.  He is being admitted to Hospital Medicine for IV fluid hydration correction of her renal issues further workup of her confusion.  Family is aware and a bedside    Amount and/or Complexity of Data Reviewed  Independent Historian: friend     Details: Pt's sister at bedside  External Data Reviewed: labs and notes.     Details: Renal failure today is new  Labs: ordered. Decision-making details documented in ED Course.     Details: Glucose 76.  TSH troponin and lipase are all normal.  Patient has mild renal insufficiency BUN of 36 creatinine 2.5.  Lactate is normal at 1.9.  She has a white count of 7.3 with a hemoglobin 10.4  Radiology: ordered. Decision-making details documented in ED Course.     Details: CT head negative chest x-ray is negative  ECG/medicine tests: ordered and independent interpretation performed. Decision-making  details documented in ED Course.     Details: No STEMI  Discussion of management or test interpretation with external provider(s): Discussed case with hospital medicine graciously accepts for admission    Risk  OTC drugs.  Prescription drug management.  Decision regarding hospitalization.  Risk Details: Indication for critical care time: Acute renal failure with altered mental status    Critical Care  Total time providing critical care: 43 minutes                ED Medication(s):  Medications   sodium chloride 0.9% bolus 1,000 mL 1,000 mL (1,000 mLs Intravenous New Bag 12/13/24 6801)       New Prescriptions    No medications on file               Scribe Attestation:   Scribe #1: I performed the above scribed service and the documentation accurately describes the services I performed. I attest to the accuracy of the note.     Attending:   Physician Attestation Statement for Scribe #1: I, Juan Love Jr., MD, personally performed the services described in this documentation, as scribed by Fabio Barnes, in my presence, and it is both accurate and complete.           Clinical Impression       ICD-10-CM ICD-9-CM   1. Acute renal failure, unspecified acute renal failure type  N17.9 584.9   2. Altered mental status  R41.82 780.97       Disposition:   Disposition: Admitted  Condition: Stable        Juan Love Jr., MD  12/1955

## 2024-12-13 NOTE — PHARMACY MED REC
"Admission Medication History     The home medication history was taken by Flavia Madera.    You may go to "Admission" then "Reconcile Home Medications" tabs to review and/or act upon these items.     The home medication list has been updated by the Pharmacy department.   Please read ALL comments highlighted in yellow.   Please address this information as you see fit.    Feel free to contact us if you have any questions or require assistance.        Medications listed below were obtained from: Patient/family and Analytic software- Mery Mccarthy (sister at bedside)      LAST [facility] REC COMPLETED:   Flavia Madera  [id number]    Current Outpatient Medications on File Prior to Encounter   Medication Sig Dispense Refill Last Dose/Taking    alendronate (FOSAMAX) 70 MG tablet Take 1 tablet (70 mg total) by mouth once a week. (Patient taking differently: Take 70 mg by mouth once a week. Thursday) 12 tablet 1 12/12/2024    amLODIPine (NORVASC) 10 MG tablet Take 1 tablet (10 mg total) by mouth once daily. 90 tablet 1 12/13/2024    ARIPiprazole (ABILIFY) 10 MG Tab Take 1 tablet (10 mg total) by mouth once daily. 30 tablet 1 12/12/2024 Bedtime    benztropine (COGENTIN) 1 MG tablet Take 1/2 to 1 tablet twice daily as needed for side effects. (Patient taking differently: Take 1 mg by mouth once daily. Take 1/2 to 1 tablet twice daily as needed for side effects.) 60 tablet 5 12/13/2024    cholecalciferol, vitamin D3, (VITAMIN D3) 25 mcg (1,000 unit) capsule Take 1 capsule (1,000 Units total) by mouth once daily. 30 capsule 11 12/13/2024    diclofenac sodium (VOLTAREN) 1 % Gel Apply 2 g topically 4 (four) times daily. for 10 days 200 g 1 Past Week    hydroCHLOROthiazide (HYDRODIURIL) 25 MG tablet Take 1 tablet (25 mg total) by mouth once daily. 90 tablet 1 12/13/2024    losartan (COZAAR) 100 MG tablet Take 1 tablet (100 mg total) by mouth once daily. 90 tablet 1 12/13/2024    metFORMIN (GLUCOPHAGE-XR) 500 MG ER 24hr " "tablet Take 1 tablet (500 mg total) by mouth 2 (two) times daily with meals. 180 tablet 1 12/13/2024    metoprolol succinate (TOPROL-XL) 25 MG 24 hr tablet Take 1 tablet (25 mg total) by mouth once daily. 90 tablet 1 12/13/2024    nystatin (MYCOSTATIN) powder APPLY TOPICALLY 4(FOUR) TIMES DAILY (Patient taking differently: Apply topically 4 (four) times daily.) 60 g 0 12/12/2024    potassium chloride SA (K-DUR,KLOR-CON M) 10 MEQ tablet TAKE ONE TABLET BY MOUTH ONE TIME DAILY (Patient taking differently: Take 10 mEq by mouth once daily.) 90 tablet 3 12/13/2024    pravastatin (PRAVACHOL) 40 MG tablet Take 1 tablet (40 mg total) by mouth once daily. 90 tablet 1 12/13/2024    traZODone (DESYREL) 100 MG tablet Take 1/2 to 1 tablet at bedtime as needed for sleep. (Patient taking differently: Take 100 mg by mouth nightly as needed. 1/2-1 nightly as needed) 30 tablet 3 12/13/2024    blood sugar diagnostic (FREESTYLE LITE STRIPS) Strp 1 strip by Misc.(Non-Drug; Combo Route) route 2 (two) times a day. TEST BLOOD GLUCOSE 1 TIME DAILY 200 each 3     blood sugar diagnostic Strp To check BG 1 times daily, to use with insurance preferred meter 100 strip 3     blood-glucose meter kit To check BG 1 times daily, to use with insurance preferred meter 1 each 0     FREESTYLE LANCETS 28 gauge lancets Apply topically once daily.       insulin needles, disposable, (PEN NEEDLE) 31 X 5/16 " Ndle Once a day insulin injection. 30 each 6     lancets Misc To check BG 2 times daily, to use with insurance preferred meter 100 each 0     lancets Misc To check BG 1 times daily, to use with insurance preferred meter 100 each 3                                  .          "

## 2024-12-13 NOTE — PROGRESS NOTES
Carisa Curry  12/13/2024  [id number]    Guadalupe Guzman DO  Patient Care Team:  Guadalupe Guzman DO as PCP - General (Internal Medicine)  Jeny Billy LPN as Care Coordinator (Internal Medicine)  Jonas Major OD as Consulting Physician (Optometry)  Ivania Mir DPM as Consulting Physician (Podiatry)  Bandar Piper MD as Consulting Physician (Psychiatry)          Visit Type:a scheduled routine follow-up visit    Chief Complaint:  Chief Complaint   Patient presents with    Follow-up          History of Present Illness:    History of Present Illness    CHIEF COMPLAINT:  Ms. Curry presents today for a six-month follow-up.    ACUTE NEUROLOGICAL CHANGES:  She is experiencing acute neurological changes including lethargy, difficulty walking, slowed responses, excessive salivation, and tremors. She is noted to have changes in mental status.    MEDICAL HISTORY:  She has chronic kidney disease and is followed by [facility] for schizophrenia with last visit in January 2024. Her hypertension is stable on current medications, and hyperlipidemia is well-controlled on pravastatin.    DIABETES MANAGEMENT:  She requires glucose monitoring machine and strips for diabetes management.             Health Maintenance Due   Topic Date Due    Colorectal Cancer Screening  Never done    RSV Vaccine (Age 60+ and Pregnant patients) (1 - Risk 60-74 years 1-dose series) Never done    Shingles Vaccine (2 of 3) 12/20/2017    Eye Exam  06/29/2023    Influenza Vaccine (1) 09/01/2024    COVID-19 Vaccine (3 - 2024-25 season) 09/01/2024    Hemoglobin A1c  12/21/2024          History:  Past Medical History:   Diagnosis Date    Arthritis     CHF (congestive heart failure)     Diabetes mellitus, type 2     Diabetic retinopathy     Hyperlipidemia     Hypertension     Schizophrenia     Seizures     reported per pt and sister, nothing since childhood     Past Surgical History:   Procedure Laterality Date     EYE SURGERY      INJECTION OF ANESTHETIC AGENT AROUND NERVE Bilateral 12/09/2019    Procedure: Bilateral Genicular nerve block (DIAGNOSTIC, NO STEROID) with RN IV sedation;  Surgeon: Butch Chao MD;  Location: [facility];  Service: Pain Management;  Laterality: Bilateral;    RADIOFREQUENCY THERMOCOAGULATION Right 12/23/2019    Procedure: Right Genicular Nerve RFA (COOLED) with RN IV sedation;  Surgeon: Butch Chao MD;  Location: [facility];  Service: Pain Management;  Laterality: Right;    RADIOFREQUENCY THERMOCOAGULATION Left 01/06/2020    Procedure: Left Genicular Nerve RFA (COOLED) with RN IV sedation;  Surgeon: Butch Chao MD;  Location: [facility];  Service: Pain Management;  Laterality: Left;    SPINE SURGERY       Family History   Problem Relation Name Age of Onset    Kidney disease Mother      Heart failure Mother      Cataracts Mother      Hypertension Mother      Heart disease Mother      Stroke Mother      Cancer Father          brain    Diabetes Father      Diabetes Maternal Aunt      Hypertension Maternal Aunt      Diabetes Paternal Aunt      Hypertension Paternal Aunt      Heart disease Paternal Aunt      Diabetes Paternal Uncle      Heart disease Paternal Uncle       Social History     Socioeconomic History    Marital status: Single    Number of children: 0   Tobacco Use    Smoking status: Former     Types: Cigarettes    Smokeless tobacco: Never   Substance and Sexual Activity    Alcohol use: No     Alcohol/week: 0.0 standard drinks of alcohol    Drug use: No    Sexual activity: Never     Social Drivers of Health     Financial Resource Strain: Low Risk  (1/26/2022)    Overall Financial Resource Strain (CARDIA)     Difficulty of Paying Living Expenses: Not hard at all   Food Insecurity: No Food Insecurity (1/26/2022)    Hunger Vital Sign     Worried About Running Out of Food in the Last Year: Never true     Ran Out of Food in the Last Year: Never true   Transportation Needs:  No Transportation Needs (1/26/2022)    PRAPARE - Transportation     Lack of Transportation (Medical): No     Lack of Transportation (Non-Medical): No   Physical Activity: Insufficiently Active (1/26/2022)    Exercise Vital Sign     Days of Exercise per Week: 4 days     Minutes of Exercise per Session: 30 min   Stress: No Stress Concern Present (1/26/2022)    [employer] - Occupational Stress Questionnaire     Feeling of Stress : Not at all   Housing Stability: Low Risk  (1/26/2022)    Housing Stability Vital Sign     Unable to Pay for Housing in the Last Year: No     Number of Places Lived in the Last Year: 1     Unstable Housing in the Last Year: No     Patient Active Problem List   Diagnosis    Hypertension goal BP (blood pressure) < 130/80    Arthritis involving multiple sites    Controlled type 2 diabetes mellitus with microalbuminuria, without long-term current use of insulin    Hyperlipidemia LDL goal <70    Severe obesity (BMI 35.0-39.9) with comorbidity    At risk for falls    Schizophrenia    Primary osteoarthritis of left knee    Primary osteoarthritis of right knee    Knee pain, chronic    Osteoporosis     Review of patient's allergies indicates:  No Known Allergies    The following were reviewed at this visit: active problem list, medication list, allergies, family history, social history, and health maintenance.    Medications:  Current Outpatient Medications on File Prior to Visit   Medication Sig Dispense Refill    alendronate (FOSAMAX) 70 MG tablet Take 1 tablet (70 mg total) by mouth once a week. 12 tablet 1    amLODIPine (NORVASC) 10 MG tablet Take 1 tablet (10 mg total) by mouth once daily. 90 tablet 1    ARIPiprazole (ABILIFY) 10 MG Tab Take 1 tablet (10 mg total) by mouth once daily. 30 tablet 1    benztropine (COGENTIN) 0.5 MG tablet Take 1 tablet (0.5 mg total) by mouth 2 (two) times daily as needed. 60 tablet 3    benztropine (COGENTIN) 1 MG tablet Take 1/2 to 1  "tablet twice daily as needed for side effects. 60 tablet 5    blood sugar diagnostic (FREESTYLE LITE STRIPS) Strp 1 strip by Misc.(Non-Drug; Combo Route) route 2 (two) times a day. TEST BLOOD GLUCOSE 1 TIME DAILY 200 each 3    cholecalciferol, vitamin D3, (VITAMIN D3) 25 mcg (1,000 unit) capsule Take 1 capsule (1,000 Units total) by mouth once daily. 30 capsule 11    FREESTYLE LANCETS 28 gauge lancets Apply topically once daily.      hydroCHLOROthiazide (HYDRODIURIL) 25 MG tablet Take 1 tablet (25 mg total) by mouth once daily. 90 tablet 1    insulin needles, disposable, (PEN NEEDLE) 31 X 5/16 " Ndle Once a day insulin injection. 30 each 6    lancets Misc To check BG 2 times daily, to use with insurance preferred meter 100 each 0    losartan (COZAAR) 100 MG tablet Take 1 tablet (100 mg total) by mouth once daily. 90 tablet 1    metFORMIN (GLUCOPHAGE-XR) 500 MG ER 24hr tablet Take 1 tablet (500 mg total) by mouth 2 (two) times daily with meals. 180 tablet 1    metoprolol succinate (TOPROL-XL) 25 MG 24 hr tablet Take 1 tablet (25 mg total) by mouth once daily. 90 tablet 1    nystatin (MYCOSTATIN) powder APPLY TOPICALLY 4(FOUR) TIMES DAILY 60 g 0    potassium chloride SA (K-DUR,KLOR-CON M) 10 MEQ tablet TAKE ONE TABLET BY MOUTH ONE TIME DAILY 90 tablet 3    pravastatin (PRAVACHOL) 40 MG tablet Take 1 tablet (40 mg total) by mouth once daily. 90 tablet 1    traZODone (DESYREL) 100 MG tablet Take 1/2 to 1 tablet at bedtime as needed for sleep. 30 tablet 3    diclofenac sodium (VOLTAREN) 1 % Gel Apply 2 g topically 4 (four) times daily. for 10 days 200 g 1     No current facility-administered medications on file prior to visit.       Medications have been reviewed and reconciled with patient at this visit.  Barriers to medications reviewed with patient.    Adverse reactions to current medications reviewed with patient..    Over the counter medications reviewed and reconciled with patient.    Exam:  Wt Readings from Last 3 " Encounters:   12/13/24 86 kg (189 lb 9.5 oz)   07/30/24 86 kg (189 lb 9.5 oz)   06/21/24 90.1 kg (198 lb 10.2 oz)     Temp Readings from Last 3 Encounters:   12/13/24 96.7 °F (35.9 °C) (Tympanic)   07/30/24 96.6 °F (35.9 °C) (Tympanic)   06/21/24 97.6 °F (36.4 °C) (Tympanic)     BP Readings from Last 3 Encounters:   12/13/24 138/86   07/30/24 120/70   06/21/24 138/86     Pulse Readings from Last 3 Encounters:   12/13/24 68   07/30/24 72   06/21/24 68     Body mass index is 32.54 kg/m².    Physical Exam  Neurological:      Motor: Weakness and tremor present.         Patient was slow to respond and having difficulty with ambulation which is a new finding  Discussed that patient would benefit by being seen in the ER for stat evaulation     Laboratory Reviewed ({Yes)  Lab Results   Component Value Date    WBC 6.72 06/21/2024    HGB 11.0 (L) 06/21/2024    HCT 32.9 (L) 06/21/2024     06/21/2024    CHOL 136 06/21/2024    TRIG 60 06/21/2024    HDL 56 06/21/2024    ALT 6 (L) 06/21/2024    AST 14 06/21/2024     (L) 06/21/2024    K 4.0 06/21/2024     06/21/2024    CREATININE 1.3 06/21/2024    BUN 21 06/21/2024    CO2 23 06/21/2024    TSH 0.721 07/01/2015    HGBA1C 5.2 06/21/2024       Carisa was seen today for follow-up.    Diagnoses and all orders for this visit:    Acute focal neurological deficit  -     Refer to Emergency Dept.    Controlled type 2 diabetes mellitus with microalbuminuria, without long-term current use of insulin  -     blood-glucose meter kit; To check BG 1 times daily, to use with insurance preferred meter  -     lancets Misc; To check BG 1 times daily, to use with insurance preferred meter  -     blood sugar diagnostic Strp; To check BG 1 times daily, to use with insurance preferred meter    Hypertension goal BP (blood pressure) < 130/80  At visit, Blood pressure is at goal. Continue current medications      Schizophrenia, unspecified type          Assessment & Plan    NEUROLOGICAL  CHANGES:  - Concerned about acute neurological changes in patient, including difficulty walking and decreased responsiveness.  - Recommend stat CT of the head and labs due to concerns about possible neurological issue.    UNSTEADINESS AND WEAKNESS:  - Concerned about acute neurological changes in patient, including difficulty walking and decreased responsiveness.    SOMNOLENCE:  - Concerned about acute neurological changes in patient, including difficulty walking and decreased responsiveness.    EMERGENCY EVALUATION:  - Will send patient to the emergency room for immediate evaluation.  - Referred patient to the emergency room for immediate evaluation, including possible stat CT of the head and labs.  - Will call the emergency room to inform them of the patient's arrival.    DIABETES MANAGEMENT:  - Ordered new glucose machine and test strips to be sent to the pharmacy.        Explained to the care giver s/s to seek emergent care    Offered to call EMS, care giver states that she will drive her to the ER   Report was called     Care Plan/Goals: Reviewed    Goals    None         Follow up: No follow-ups on file.    After visit summary was printed and given to patient upon discharge today.  Patient goals and care plan are included in After Visit Summary.

## 2024-12-14 PROBLEM — N17.9 ACUTE KIDNEY INJURY SUPERIMPOSED ON CHRONIC KIDNEY DISEASE: Status: ACTIVE | Noted: 2024-12-14

## 2024-12-14 PROBLEM — F20.9 SCHIZOPHRENIA: Chronic | Status: ACTIVE | Noted: 2019-02-11

## 2024-12-14 PROBLEM — E66.811 CLASS 1 OBESITY DUE TO EXCESS CALORIES WITH SERIOUS COMORBIDITY AND BODY MASS INDEX (BMI) OF 32.0 TO 32.9 IN ADULT: Chronic | Status: ACTIVE | Noted: 2024-12-14

## 2024-12-14 PROBLEM — E66.09 CLASS 1 OBESITY DUE TO EXCESS CALORIES WITH SERIOUS COMORBIDITY AND BODY MASS INDEX (BMI) OF 32.0 TO 32.9 IN ADULT: Status: ACTIVE | Noted: 2024-12-14

## 2024-12-14 PROBLEM — N18.9 ACUTE KIDNEY INJURY SUPERIMPOSED ON CHRONIC KIDNEY DISEASE: Status: ACTIVE | Noted: 2024-12-14

## 2024-12-14 PROBLEM — R41.82 AMS (ALTERED MENTAL STATUS): Status: ACTIVE | Noted: 2024-12-14

## 2024-12-14 PROBLEM — E66.811 CLASS 1 OBESITY DUE TO EXCESS CALORIES WITH SERIOUS COMORBIDITY AND BODY MASS INDEX (BMI) OF 32.0 TO 32.9 IN ADULT: Status: ACTIVE | Noted: 2024-12-14

## 2024-12-14 PROBLEM — E66.09 CLASS 1 OBESITY DUE TO EXCESS CALORIES WITH SERIOUS COMORBIDITY AND BODY MASS INDEX (BMI) OF 32.0 TO 32.9 IN ADULT: Chronic | Status: ACTIVE | Noted: 2024-12-14

## 2024-12-14 PROBLEM — I50.9 CHF (CONGESTIVE HEART FAILURE): Status: ACTIVE | Noted: 2024-12-14

## 2024-12-14 LAB
ALBUMIN SERPL BCP-MCNC: 3.2 G/DL (ref 3.5–5.2)
ALP SERPL-CCNC: 95 U/L (ref 40–150)
ALT SERPL W/O P-5'-P-CCNC: 9 U/L (ref 10–44)
ANION GAP SERPL CALC-SCNC: 13 MMOL/L (ref 8–16)
AORTIC ROOT ANNULUS: 2.9 CM
ASCENDING AORTA: 3.35 CM
AST SERPL-CCNC: 21 U/L (ref 10–40)
AV INDEX (PROSTH): 0.78
AV MEAN GRADIENT: 11.5 MMHG
AV PEAK GRADIENT: 16 MMHG
AV VALVE AREA BY VELOCITY RATIO: 2.6 CM²
AV VALVE AREA: 2.7 CM²
AV VELOCITY RATIO: 0.75
BASOPHILS # BLD AUTO: 0.05 K/UL (ref 0–0.2)
BASOPHILS NFR BLD: 1 % (ref 0–1.9)
BILIRUB SERPL-MCNC: 0.5 MG/DL (ref 0.1–1)
BSA FOR ECHO PROCEDURE: 1.88 M2
BUN SERPL-MCNC: 31 MG/DL (ref 8–23)
CALCIUM SERPL-MCNC: 9 MG/DL (ref 8.7–10.5)
CHLORIDE SERPL-SCNC: 104 MMOL/L (ref 95–110)
CO2 SERPL-SCNC: 21 MMOL/L (ref 23–29)
CREAT SERPL-MCNC: 1.8 MG/DL (ref 0.5–1.4)
CV ECHO LV RWT: 0.6 CM
DIFFERENTIAL METHOD BLD: ABNORMAL
DOP CALC AO PEAK VEL: 2 M/S
DOP CALC AO VTI: 45.4 CM
DOP CALC LVOT AREA: 3.5 CM2
DOP CALC LVOT DIAMETER: 2.1 CM
DOP CALC LVOT PEAK VEL: 1.5 M/S
DOP CALC LVOT STROKE VOLUME: 123.2 CM3
DOP CALC RVOT PEAK VEL: 1.35 M/S
DOP CALC RVOT VTI: 20.6 CM
DOP CALCLVOT PEAK VEL VTI: 35.6 CM
E WAVE DECELERATION TIME: 328.53 MSEC
E/A RATIO: 0.84
E/E' RATIO: 6.95 M/S
ECHO LV POSTERIOR WALL: 1.2 CM (ref 0.6–1.1)
EOSINOPHIL # BLD AUTO: 0.1 K/UL (ref 0–0.5)
EOSINOPHIL NFR BLD: 2.1 % (ref 0–8)
ERYTHROCYTE [DISTWIDTH] IN BLOOD BY AUTOMATED COUNT: 14.4 % (ref 11.5–14.5)
EST. GFR  (NO RACE VARIABLE): 30 ML/MIN/1.73 M^2
FRACTIONAL SHORTENING: 42.5 % (ref 28–44)
GLUCOSE SERPL-MCNC: 89 MG/DL (ref 70–110)
HCT VFR BLD AUTO: 30.5 % (ref 37–48.5)
HGB BLD-MCNC: 10 G/DL (ref 12–16)
IMM GRANULOCYTES # BLD AUTO: 0.01 K/UL (ref 0–0.04)
IMM GRANULOCYTES NFR BLD AUTO: 0.2 % (ref 0–0.5)
INTERVENTRICULAR SEPTUM: 1.2 CM (ref 0.6–1.1)
IVC DIAMETER: 0.88 CM
IVRT: 59.94 MSEC
LA MAJOR: 4.25 CM
LA MINOR: 3.13 CM
LA WIDTH: 2.9 CM
LEFT ATRIUM SIZE: 2.63 CM
LEFT ATRIUM VOLUME INDEX: 12.8 ML/M2
LEFT ATRIUM VOLUME: 23.37 CM3
LEFT INTERNAL DIMENSION IN SYSTOLE: 2.3 CM (ref 2.1–4)
LEFT VENTRICLE DIASTOLIC VOLUME INDEX: 38.74 ML/M2
LEFT VENTRICLE DIASTOLIC VOLUME: 70.51 ML
LEFT VENTRICLE MASS INDEX: 90.9 G/M2
LEFT VENTRICLE SYSTOLIC VOLUME INDEX: 10.4 ML/M2
LEFT VENTRICLE SYSTOLIC VOLUME: 18.86 ML
LEFT VENTRICULAR INTERNAL DIMENSION IN DIASTOLE: 4 CM (ref 3.5–6)
LEFT VENTRICULAR MASS: 165.5 G
LV LATERAL E/E' RATIO: 8.25 M/S
LV SEPTAL E/E' RATIO: 6 M/S
LVED V (TEICH): 70.51 ML
LVES V (TEICH): 18.86 ML
LVOT MG: 6.91 MMHG
LVOT MV: 1.29 CM/S
LYMPHOCYTES # BLD AUTO: 1.6 K/UL (ref 1–4.8)
LYMPHOCYTES NFR BLD: 30.8 % (ref 18–48)
MCH RBC QN AUTO: 27.3 PG (ref 27–31)
MCHC RBC AUTO-ENTMCNC: 32.8 G/DL (ref 32–36)
MCV RBC AUTO: 83 FL (ref 82–98)
MONOCYTES # BLD AUTO: 0.6 K/UL (ref 0.3–1)
MONOCYTES NFR BLD: 10.5 % (ref 4–15)
MV PEAK A VEL: 0.79 M/S
MV PEAK E VEL: 0.66 M/S
MV STENOSIS PRESSURE HALF TIME: 95.27 MS
MV VALVE AREA P 1/2 METHOD: 2.31 CM2
NEUTROPHILS # BLD AUTO: 2.9 K/UL (ref 1.8–7.7)
NEUTROPHILS NFR BLD: 55.4 % (ref 38–73)
NRBC BLD-RTO: 0 /100 WBC
PISA TR MAX VEL: 2.68 M/S
PLATELET # BLD AUTO: 314 K/UL (ref 150–450)
PMV BLD AUTO: 12.2 FL (ref 9.2–12.9)
POCT GLUCOSE: 123 MG/DL (ref 70–110)
POCT GLUCOSE: 130 MG/DL (ref 70–110)
POCT GLUCOSE: 63 MG/DL (ref 70–110)
POCT GLUCOSE: 72 MG/DL (ref 70–110)
POCT GLUCOSE: 85 MG/DL (ref 70–110)
POTASSIUM SERPL-SCNC: 3.8 MMOL/L (ref 3.5–5.1)
PROT SERPL-MCNC: 7.5 G/DL (ref 6–8.4)
PV MEAN GRADIENT: 3 MMHG
RA MAJOR: 2.42 CM
RA PRESSURE ESTIMATED: 3 MMHG
RA WIDTH: 2.5 CM
RBC # BLD AUTO: 3.66 M/UL (ref 4–5.4)
RV TB RVSP: 6 MMHG
SODIUM SERPL-SCNC: 138 MMOL/L (ref 136–145)
STJ: 3.26 CM
TDI LATERAL: 0.08 M/S
TDI SEPTAL: 0.11 M/S
TDI: 0.1 M/S
TR MAX PG: 29 MMHG
TRICUSPID ANNULAR PLANE SYSTOLIC EXCURSION: 2.02 CM
TV REST PULMONARY ARTERY PRESSURE: 32 MMHG
WBC # BLD AUTO: 5.22 K/UL (ref 3.9–12.7)
Z-SCORE OF LEFT VENTRICULAR DIMENSION IN END DIASTOLE: -2.31
Z-SCORE OF LEFT VENTRICULAR DIMENSION IN END SYSTOLE: -2.39

## 2024-12-14 PROCEDURE — 97530 THERAPEUTIC ACTIVITIES: CPT

## 2024-12-14 PROCEDURE — 80053 COMPREHEN METABOLIC PANEL: CPT | Performed by: HOSPITALIST

## 2024-12-14 PROCEDURE — 36415 COLL VENOUS BLD VENIPUNCTURE: CPT | Performed by: HOSPITALIST

## 2024-12-14 PROCEDURE — 25000003 PHARM REV CODE 250: Performed by: HOSPITALIST

## 2024-12-14 PROCEDURE — 97162 PT EVAL MOD COMPLEX 30 MIN: CPT

## 2024-12-14 PROCEDURE — 85025 COMPLETE CBC W/AUTO DIFF WBC: CPT | Performed by: HOSPITALIST

## 2024-12-14 PROCEDURE — 63600175 PHARM REV CODE 636 W HCPCS: Performed by: HOSPITALIST

## 2024-12-14 PROCEDURE — 97167 OT EVAL HIGH COMPLEX 60 MIN: CPT

## 2024-12-14 PROCEDURE — 21400001 HC TELEMETRY ROOM

## 2024-12-14 PROCEDURE — 25000003 PHARM REV CODE 250: Performed by: EMERGENCY MEDICINE

## 2024-12-14 PROCEDURE — 97535 SELF CARE MNGMENT TRAINING: CPT

## 2024-12-14 RX ORDER — PRAVASTATIN SODIUM 20 MG/1
40 TABLET ORAL DAILY
Status: DISCONTINUED | OUTPATIENT
Start: 2024-12-14 | End: 2024-12-15 | Stop reason: HOSPADM

## 2024-12-14 RX ORDER — SODIUM CHLORIDE 9 MG/ML
INJECTION, SOLUTION INTRAVENOUS CONTINUOUS
Status: DISCONTINUED | OUTPATIENT
Start: 2024-12-14 | End: 2024-12-15 | Stop reason: HOSPADM

## 2024-12-14 RX ORDER — METOPROLOL SUCCINATE 25 MG/1
25 TABLET, EXTENDED RELEASE ORAL DAILY
Status: DISCONTINUED | OUTPATIENT
Start: 2024-12-14 | End: 2024-12-15 | Stop reason: HOSPADM

## 2024-12-14 RX ORDER — BENZTROPINE MESYLATE 0.5 MG/1
1 TABLET ORAL DAILY
Status: DISCONTINUED | OUTPATIENT
Start: 2024-12-14 | End: 2024-12-15 | Stop reason: HOSPADM

## 2024-12-14 RX ORDER — POTASSIUM CHLORIDE 750 MG/1
10 TABLET, EXTENDED RELEASE ORAL DAILY
Status: DISCONTINUED | OUTPATIENT
Start: 2024-12-14 | End: 2024-12-15 | Stop reason: HOSPADM

## 2024-12-14 RX ORDER — ARIPIPRAZOLE 5 MG/1
10 TABLET ORAL DAILY
Status: DISCONTINUED | OUTPATIENT
Start: 2024-12-14 | End: 2024-12-15 | Stop reason: HOSPADM

## 2024-12-14 RX ORDER — AMLODIPINE BESYLATE 10 MG/1
10 TABLET ORAL DAILY
Status: DISCONTINUED | OUTPATIENT
Start: 2024-12-14 | End: 2024-12-15 | Stop reason: HOSPADM

## 2024-12-14 RX ORDER — HYDRALAZINE HYDROCHLORIDE 20 MG/ML
10 INJECTION INTRAMUSCULAR; INTRAVENOUS EVERY 6 HOURS PRN
Status: DISCONTINUED | OUTPATIENT
Start: 2024-12-14 | End: 2024-12-15 | Stop reason: HOSPADM

## 2024-12-14 RX ADMIN — ARIPIPRAZOLE 10 MG: 5 TABLET ORAL at 08:12

## 2024-12-14 RX ADMIN — ENOXAPARIN SODIUM 30 MG: 30 INJECTION SUBCUTANEOUS at 01:12

## 2024-12-14 RX ADMIN — ALUMINUM HYDROXIDE, MAGNESIUM HYDROXIDE, AND DIMETHICONE 30 ML: 200; 20; 200 SUSPENSION ORAL at 09:12

## 2024-12-14 RX ADMIN — ACETAMINOPHEN 650 MG: 325 TABLET ORAL at 09:12

## 2024-12-14 RX ADMIN — SODIUM CHLORIDE: 9 INJECTION, SOLUTION INTRAVENOUS at 11:12

## 2024-12-14 RX ADMIN — BENZTROPINE MESYLATE 1 MG: 0.5 TABLET ORAL at 08:12

## 2024-12-14 RX ADMIN — POTASSIUM CHLORIDE 10 MEQ: 750 TABLET, EXTENDED RELEASE ORAL at 08:12

## 2024-12-14 RX ADMIN — ENOXAPARIN SODIUM 30 MG: 30 INJECTION SUBCUTANEOUS at 05:12

## 2024-12-14 RX ADMIN — AMLODIPINE BESYLATE 10 MG: 10 TABLET ORAL at 08:12

## 2024-12-14 RX ADMIN — PRAVASTATIN SODIUM 40 MG: 20 TABLET ORAL at 08:12

## 2024-12-14 NOTE — PT/OT/SLP EVAL
Physical Therapy Evaluation and Treatment    Patient Name:  Carisa Curry   MRN:  [id number]    Recommendations:     Discharge Recommendations: Moderate Intensity Therapy   Discharge Equipment Recommendations: to be determined by next level of care   Barriers to discharge: None    Assessment:     Carisa Curry is a 69 y.o. female admitted with a medical diagnosis of AMS (altered mental status).  She presents with the following impairments/functional limitations: weakness, impaired endurance, impaired functional mobility, gait instability, impaired balance, decreased ROM, impaired coordination, decreased lower extremity function, decreased safety awareness, impaired muscle length, impaired joint extensibility .    Rehab Prognosis: Fair; patient would benefit from acute skilled PT services to address these deficits and reach maximum level of function.    Recent Surgery: * No surgery found *     Plan:     During this hospitalization, patient to be seen 3 x/week to address the identified rehab impairments via gait training, therapeutic activities, therapeutic exercises and progress toward the following goals:    Plan of Care Expires:  12/28/24    Subjective     Chief Complaint: NONE, AGREEABLE TO OOB MOBILITY  Patient/Family Comments/goals:   Pain/Comfort:  Pain Rating 1: 0/10    Patients cultural, spiritual, Hinduism conflicts given the current situation:      Living Environment:  PT LIVES WITH 2 SISTERS, REPORTS ONLY 1 SISTER CAN HELP HER, PT AMB WITH RW HOUSEHOLD DISTANCES AND MINIMALLY IN THE YARD.  PT DOES NOT DRIVE, HH AIDE COMES 1X A WEEK TO ASSIST WITH BATHING  Prior to admission, patients level of function was SHORT DISTANCE GAIT WITH RW.  Equipment used at home: walker, rolling.  DME owned (not currently used): none.  Upon discharge, patient will have assistance from 1 SISTER.    Objective:     Communicated with NURSE KYLE prior to session.  Patient found supine with telemetry, peripheral IV, PureWick,  bed alarm  upon PT entry to room.    General Precautions: Standard, fall  Orthopedic Precautions:N/A   Braces: N/A  Respiratory Status: Room air    Exams:  Cognitive Exam:  Patient is oriented to Person, REPORTS SHE IS AT [facility], REPORTS IT IS DECEMBER, UNAWARE OF YEAR AND SITUATION.        **PT SLOW/DELAYED WITH ANSWERING QUESTIONS AND FOLLOWING COMMANDS, REQUIRES CONSTANT CUES TO ATTEND TO TASK, PRESENTS AS SOFT SPOKEN WITH MUMBLING/GARBLED SPEECH-DIFFICULT TO UNDERSTAND-UNSURE IF BASELINE   Postural Exam:  Patient presented with the following abnormalities:    -       Rounded shoulders  -       STRONG POSTERIOR INSTABILITY IN SITTING AND STANDING  Sensation:    -       Intact  Skin Integrity/Edema:      -       Edema: Mild BLE  RLE ROM: WFL AT HIP AND KNEE, LIMITED FLEXION AT KNEE APPROX. 80 DEGREE'S  RLE Strength: UNABLE TO ASSESS-PAINFUL WITH MOVEMENT  LLE ROM: WFL AT HIP AND KNEE, LIMITED FLEXION AT KNEE APPROX. 80 DEGREE'S  LLE Strength: UNABLE TO ASSESS-PAINFUL WITH MOVEMENT  **LACK OF FLEXION IN B KNEE'S IS ATTRIBUTING TO PT'S POSTERIOR INSTABILITY    Functional Mobility:  Bed Mobility:   PT REQUIRED EXTENDED TIME TO COMPLETE TASKS, CONSTANT CUES TO ATTEND TO TASK, EASILY DISTRACTED BUT GOOD EFFORT TO PARTICIPATE  Rolling Right: minimum assistance  Scooting: minimum assistance  Supine to Sit: minimum assistance  Transfers:     Sit to Stand:  moderate assistance and of 2 persons with rolling walker  Bed to Chair: moderate assistance and of 2 persons with  rolling walker  using  Step Transfer  Gait: PT TOOK APPROX. 4 SMALL SHUFFLING STEPS WITH RW AND MODA X 2 TO TF FROM BED TO CHAIR, CUES FOR UPRIGHT POSTURE DUE TO POSTERIOR INSTABILITY  Balance: POOR SITTING BALANCE, POOR STANDING BALANCE  TOLERATED UN-ASSISTED SITTING AT EOB FOR APPROX. 8 MINUTES WITH SBA ONCE ADJUSTED TO EOB  PT TOLERATED STATIC STAND FOR APPROX 6 MINUTES WITH RW AND MODA TO PREVENT LOB POSTERIORLY, PT INCONTINENT OF URINE IN BED,  "REQUIRED ASSIST FOR CLEANING AND CHANGING SOILED BRIEF TOTAL ASSIST    AM-PAC 6 CLICK MOBILITY  Total Score:13     Treatment & Education:  PT EDUCATION:  - ROLE OF P.T. AND POC IN ACUTE CARE HOSPITAL SETTING  - RW USE AND SAFETY DURING TF'S AND GAIT  - ENCOURAGED TO INCREASE TIME OOB IN CHAIR TO TOLERANCE   - TO CONTINUE THERAPUETIC EXERCISES THROUGHOUT THE DAY TO INCREASE ACTIVITY TOLERANCE AND DECREASE RISK FOR PNEUMONIA AND BLOOD CLOTS: HIP FLEX/EXT, HIP ABD/ADD, QUAD SET, HEEL SLIDE, AP  - RISK FOR FALLS DUE TO GENERALIZED WEAKNESS, EDUCATED ON "CALL DON'T FALL", ENCOURAGED TO CALL FOR ASSISTANCE WITH ALL NEEDS SUCH AS BED<>CHAIR TRANSFERS OR TRIPS TO BATHROOM, PT AGREEABLE TO SAFETY PRECAUTIONS    Patient left up in chair with all lines intact, call button in reach, chair alarm on, and NURSE notified.    GOALS:   Multidisciplinary Problems       Physical Therapy Goals          Problem: Physical Therapy    Goal Priority Disciplines Outcome Interventions   Physical Therapy Goal     PT, PT/OT     Description: LTG'S TO BE MET IN 14 DAYS (12-28-24)  PT WILL REQUIRE CGA FOR BED MOBILITY  PT WILL REQUIRE [facility] FOR BED<>CHAIR TF'S  PT WILL  FEET WITH RW AND RAF  PT WILL INC AMPAC SCORE BY 2 POINTS TO PROGRESS GROSS FUNC MOBILITY                         History:     Past Medical History:   Diagnosis Date    Arthritis     CHF (congestive heart failure)     Diabetes mellitus, type 2     Diabetic retinopathy     Hyperlipidemia     Hypertension     Schizophrenia     Seizures     reported per pt and sister, nothing since childhood       Past Surgical History:   Procedure Laterality Date    EYE SURGERY      INJECTION OF ANESTHETIC AGENT AROUND NERVE Bilateral 12/09/2019    Procedure: Bilateral Genicular nerve block (DIAGNOSTIC, NO STEROID) with RN IV sedation;  Surgeon: Butch Chao MD;  Location: [facility];  Service: Pain Management;  Laterality: Bilateral;    RADIOFREQUENCY THERMOCOAGULATION Right 12/23/2019 "    Procedure: Right Genicular Nerve RFA (COOLED) with RN IV sedation;  Surgeon: Butch Chao MD;  Location: [facility];  Service: Pain Management;  Laterality: Right;    RADIOFREQUENCY THERMOCOAGULATION Left 01/06/2020    Procedure: Left Genicular Nerve RFA (COOLED) with RN IV sedation;  Surgeon: Butch Chao MD;  Location: [facility];  Service: Pain Management;  Laterality: Left;    SPINE SURGERY         Time Tracking:     PT Received On: 12/14/24  PT Start Time: 0915     PT Stop Time: 0945  PT Total Time (min): 30 min     Billable Minutes: Evaluation 15 and Therapeutic Activity 15    12/14/2024

## 2024-12-14 NOTE — ASSESSMENT & PLAN NOTE
Patient's FSGs are controlled on current medication regimen.  Last A1c reviewed-   Lab Results   Component Value Date    HGBA1C 5.2 06/21/2024     Most recent fingerstick glucose reviewed-   Recent Labs   Lab 12/13/24  1511 12/13/24  1804 12/13/24 2205   POCTGLUCOSE 109 76 82     Current correctional scale  Low  Titrate as needed  anti-hyperglycemic dose as follows-   Antihyperglycemics (From admission, onward)      Start     Stop Route Frequency Ordered    12/13/24 2229  insulin aspart U-100 pen 0-5 Units         -- SubQ Before meals & nightly PRN 12/13/24 2130        Plan:  -SSI  -A1c  -Accu-checks  -Hold oral hypoglycemics while patient is in the hospital  -Continue home long-acting insulin at 20% decrease, titrate up as needed  -Hypoglycemic protocol

## 2024-12-14 NOTE — ASSESSMENT & PLAN NOTE
"Patient has  reported history of  heart failure (unknown type) that is Chronic. On presentation their CHF was well compensated. Most recent BNP and echo results are listed below.  No results for input(s): "BNP" in the last 72 hours.  Latest ECHO  No results found for this or any previous visit.    Current Heart Failure Medications       Plan  -Monitor strict I&Os and daily weights.    -Place on telemetry  -Low sodium diet  -Place on fluid restriction of 1.5 L.   -Cardiology has not been consulted  -The patient's volume status is at their baseline  -Continue home medications  -f/u echo    "

## 2024-12-14 NOTE — PLAN OF CARE
A248/A248 CHARLES  Carisa Curry is a 69 y.o.female admitted on 12/13/2024 for AMS (altered mental status)   Code Status: Full Code MRN: [id number]   Review of patient's allergies indicates:  No Known Allergies  Past Medical History:   Diagnosis Date    Arthritis     CHF (congestive heart failure)     Diabetes mellitus, type 2     Diabetic retinopathy     Hyperlipidemia     Hypertension     Schizophrenia     Seizures     reported per pt and sister, nothing since childhood      PRN meds    acetaminophen, 650 mg, Q6H PRN  acetaminophen, 650 mg, Q8H PRN  albuterol-ipratropium, 3 mL, Q6H PRN  aluminum-magnesium hydroxide-simethicone, 30 mL, QID PRN  dextrose 10%, 12.5 g, PRN  dextrose 10%, 25 g, PRN  glucagon (human recombinant), 1 mg, PRN  glucose, 16 g, PRN  glucose, 24 g, PRN  hydrALAZINE, 10 mg, Q6H PRN  insulin aspart U-100, 0-5 Units, QID (AC + HS) PRN  melatonin, 6 mg, Nightly PRN  naloxone, 0.02 mg, PRN  ondansetron, 4 mg, Q8H PRN  promethazine, 25 mg, Q6H PRN  senna-docusate 8.6-50 mg, 1 tablet, BID PRN  sodium chloride 0.9%, 10 mL, Q12H PRN      Chart check completed. Will continue plan of care.      Orientation: oriented x 4  [place] Coma Scale Score: 15     Lead Monitored: Lead II Rhythm: sinus bradycardia    Cardiac/Telemetry Box Number: [id number]  VTE Core Measure: (SCDs) Sequential compression device initiated/maintained Last Bowel Movement: 12/12/24  Diet diabetic Cardiac (Low Na/Chol); 2000 Calories (up to 75 gm per meal)  Voiding Characteristics: external catheter  Lane Score: 22  Fall Risk Score: 17  Accucheck []   Freq?      Lines/Drains/Airways       Peripheral Intravenous Line  Duration                  Peripheral IV - Single Lumen 12/13/24 1537 20 G Right Antecubital 1 day

## 2024-12-14 NOTE — SUBJECTIVE & OBJECTIVE
Past Medical History:   Diagnosis Date    Arthritis     CHF (congestive heart failure)     Diabetes mellitus, type 2     Diabetic retinopathy     Hyperlipidemia     Hypertension     Schizophrenia     Seizures     reported per pt and sister, nothing since childhood       Past Surgical History:   Procedure Laterality Date    EYE SURGERY      INJECTION OF ANESTHETIC AGENT AROUND NERVE Bilateral 12/09/2019    Procedure: Bilateral Genicular nerve block (DIAGNOSTIC, NO STEROID) with RN IV sedation;  Surgeon: Butch Chao MD;  Location: [facility];  Service: Pain Management;  Laterality: Bilateral;    RADIOFREQUENCY THERMOCOAGULATION Right 12/23/2019    Procedure: Right Genicular Nerve RFA (COOLED) with RN IV sedation;  Surgeon: Butch Chao MD;  Location: [facility];  Service: Pain Management;  Laterality: Right;    RADIOFREQUENCY THERMOCOAGULATION Left 01/06/2020    Procedure: Left Genicular Nerve RFA (COOLED) with RN IV sedation;  Surgeon: Butch Chao MD;  Location: [facility];  Service: Pain Management;  Laterality: Left;    SPINE SURGERY         Review of patient's allergies indicates:  No Known Allergies    No current facility-administered medications on file prior to encounter.     Current Outpatient Medications on File Prior to Encounter   Medication Sig    alendronate (FOSAMAX) 70 MG tablet Take 1 tablet (70 mg total) by mouth once a week. (Patient taking differently: Take 70 mg by mouth once a week. Thursday)    amLODIPine (NORVASC) 10 MG tablet Take 1 tablet (10 mg total) by mouth once daily.    ARIPiprazole (ABILIFY) 10 MG Tab Take 1 tablet (10 mg total) by mouth once daily.    benztropine (COGENTIN) 1 MG tablet Take 1/2 to 1 tablet twice daily as needed for side effects. (Patient taking differently: Take 1 mg by mouth once daily. Take 1/2 to 1 tablet twice daily as needed for side effects.)    cholecalciferol, vitamin D3, (VITAMIN D3) 25 mcg (1,000 unit) capsule Take 1 capsule (1,000  "Units total) by mouth once daily.    diclofenac sodium (VOLTAREN) 1 % Gel Apply 2 g topically 4 (four) times daily. for 10 days    hydroCHLOROthiazide (HYDRODIURIL) 25 MG tablet Take 1 tablet (25 mg total) by mouth once daily.    losartan (COZAAR) 100 MG tablet Take 1 tablet (100 mg total) by mouth once daily.    metFORMIN (GLUCOPHAGE-XR) 500 MG ER 24hr tablet Take 1 tablet (500 mg total) by mouth 2 (two) times daily with meals.    metoprolol succinate (TOPROL-XL) 25 MG 24 hr tablet Take 1 tablet (25 mg total) by mouth once daily.    nystatin (MYCOSTATIN) powder APPLY TOPICALLY 4(FOUR) TIMES DAILY (Patient taking differently: Apply topically 4 (four) times daily.)    potassium chloride SA (K-DUR,KLOR-CON M) 10 MEQ tablet TAKE ONE TABLET BY MOUTH ONE TIME DAILY (Patient taking differently: Take 10 mEq by mouth once daily.)    pravastatin (PRAVACHOL) 40 MG tablet Take 1 tablet (40 mg total) by mouth once daily.    traZODone (DESYREL) 100 MG tablet Take 1/2 to 1 tablet at bedtime as needed for sleep. (Patient taking differently: Take 100 mg by mouth nightly as needed. 1/2-1 nightly as needed)    blood sugar diagnostic (FREESTYLE LITE STRIPS) Strp 1 strip by Misc.(Non-Drug; Combo Route) route 2 (two) times a day. TEST BLOOD GLUCOSE 1 TIME DAILY    blood sugar diagnostic Strp To check BG 1 times daily, to use with insurance preferred meter    blood-glucose meter kit To check BG 1 times daily, to use with insurance preferred meter    FREESTYLE LANCETS 28 gauge lancets Apply topically once daily.    insulin needles, disposable, (PEN NEEDLE) 31 X 5/16 " Ndle Once a day insulin injection.    lancets Misc To check BG 2 times daily, to use with insurance preferred meter    lancets Misc To check BG 1 times daily, to use with insurance preferred meter     Family History       Problem Relation (Age of Onset)    Cancer Father    Cataracts Mother    Diabetes Father, Maternal Aunt, Paternal Aunt, Paternal Uncle    Heart disease " Mother, Paternal Aunt, Paternal Uncle    Heart failure Mother    Hypertension Mother, Maternal Aunt, Paternal Aunt    Kidney disease Mother    Stroke Mother          Tobacco Use    Smoking status: Former     Types: Cigarettes    Smokeless tobacco: Never   Substance and Sexual Activity    Alcohol use: No     Alcohol/week: 0.0 standard drinks of alcohol    Drug use: No    Sexual activity: Never     Review of Systems   Unable to perform ROS: Patient nonverbal   All other systems reviewed and are negative.    Objective:     Vital Signs (Most Recent):  Temp: 98 °F (36.7 °C) (12/13/24 2349)  Pulse: 68 (12/13/24 2349)  Resp: 18 (12/13/24 2349)  BP: (!) 142/70 (12/13/24 2349)  SpO2: 99 % (12/13/24 2349) Vital Signs (24h Range):  Temp:  [96.7 °F (35.9 °C)-98.1 °F (36.7 °C)] 98 °F (36.7 °C)  Pulse:  [68-78] 68  Resp:  [16-20] 18  SpO2:  [99 %-100 %] 99 %  BP: (137-147)/(61-86) 142/70     Weight: 79.4 kg (175 lb 0.7 oz)  Body mass index is 32.02 kg/m².     Physical Exam  Vitals reviewed.   Constitutional:       General: She is not in acute distress.     Appearance: Normal appearance. She is obese. She is not ill-appearing, toxic-appearing or diaphoretic.   HENT:      Head: Normocephalic and atraumatic.      Right Ear: External ear normal.      Left Ear: External ear normal.      Nose: Nose normal. No congestion or rhinorrhea.      Mouth/Throat:      Mouth: Mucous membranes are moist.      Pharynx: Oropharynx is clear. No oropharyngeal exudate or posterior oropharyngeal erythema.   Eyes:      General: No scleral icterus.     Extraocular Movements: Extraocular movements intact.      Conjunctiva/sclera: Conjunctivae normal.      Pupils: Pupils are equal, round, and reactive to light.   Neck:      Vascular: No carotid bruit.   Cardiovascular:      Rate and Rhythm: Normal rate and regular rhythm.      Pulses: Normal pulses.      Heart sounds: Normal heart sounds. No murmur heard.     No friction rub. No gallop.   Pulmonary:       Effort: Pulmonary effort is normal. No respiratory distress.      Breath sounds: Normal breath sounds. No stridor. No wheezing, rhonchi or rales.   Chest:      Chest wall: No tenderness.   Abdominal:      General: Abdomen is flat. Bowel sounds are normal. There is no distension.      Palpations: Abdomen is soft. There is no mass.      Tenderness: There is no abdominal tenderness. There is no right CVA tenderness, left CVA tenderness, guarding or rebound.      Hernia: No hernia is present.   Musculoskeletal:         General: No swelling, tenderness, deformity or signs of injury. Normal range of motion.      Cervical back: Normal range of motion and neck supple. No rigidity or tenderness.      Right lower leg: No edema.      Left lower leg: No edema.   Lymphadenopathy:      Cervical: No cervical adenopathy.   Skin:     General: Skin is warm and dry.      Capillary Refill: Capillary refill takes less than 2 seconds.      Coloration: Skin is not jaundiced or pale.      Findings: No bruising, erythema, lesion or rash.   Neurological:      Mental Status: She is alert.      Cranial Nerves: No cranial nerve deficit.      Sensory: No sensory deficit.      Motor: Weakness present.      Coordination: Coordination normal.      Comments: Patient awake and alert, able to follow commands but is nonverbal.  4/5 strength throughout bilateral upper extremities.  3/5 strength throughout bilateral lower extremities.  Sensation to light touch grossly intact throughout.   Psychiatric:         Mood and Affect: Mood normal.         Behavior: Behavior normal.         Thought Content: Thought content normal.         Judgment: Judgment normal.      Comments: Patient nonverbal              CRANIAL NERVES     CN III, IV, VI   Pupils are equal, round, and reactive to light.       Significant Labs: All pertinent labs within the past 24 hours have been reviewed.    Significant Imaging: I have reviewed all pertinent imaging results/findings within  the past 24 hours.    LABS:  Recent Results (from the past 24 hours)   POCT glucose    Collection Time: 12/13/24  3:11 PM   Result Value Ref Range    POCT Glucose 109 70 - 110 mg/dL   CBC auto differential    Collection Time: 12/13/24  3:38 PM   Result Value Ref Range    WBC 7.31 3.90 - 12.70 K/uL    RBC 3.80 (L) 4.00 - 5.40 M/uL    Hemoglobin 10.4 (L) 12.0 - 16.0 g/dL    Hematocrit 31.4 (L) 37.0 - 48.5 %    MCV 83 82 - 98 fL    MCH 27.4 27.0 - 31.0 pg    MCHC 33.1 32.0 - 36.0 g/dL    RDW 14.3 11.5 - 14.5 %    Platelets 315 150 - 450 K/uL    MPV 10.9 9.2 - 12.9 fL    Immature Granulocytes 0.3 0.0 - 0.5 %    Gran # (ANC) 5.1 1.8 - 7.7 K/uL    Immature Grans (Abs) 0.02 0.00 - 0.04 K/uL    Lymph # 1.3 1.0 - 4.8 K/uL    Mono # 0.8 0.3 - 1.0 K/uL    Eos # 0.1 0.0 - 0.5 K/uL    Baso # 0.05 0.00 - 0.20 K/uL    nRBC 0 0 /100 WBC    Gran % 69.3 38.0 - 73.0 %    Lymph % 17.9 (L) 18.0 - 48.0 %    Mono % 10.4 4.0 - 15.0 %    Eosinophil % 1.4 0.0 - 8.0 %    Basophil % 0.7 0.0 - 1.9 %    Differential Method Automated    Comprehensive metabolic panel    Collection Time: 12/13/24  3:38 PM   Result Value Ref Range    Sodium 134 (L) 136 - 145 mmol/L    Potassium 4.2 3.5 - 5.1 mmol/L    Chloride 98 95 - 110 mmol/L    CO2 21 (L) 23 - 29 mmol/L    Glucose 99 70 - 110 mg/dL    BUN 36 (H) 8 - 23 mg/dL    Creatinine 2.5 (H) 0.5 - 1.4 mg/dL    Calcium 9.6 8.7 - 10.5 mg/dL    Total Protein 8.4 6.0 - 8.4 g/dL    Albumin 3.7 3.5 - 5.2 g/dL    Total Bilirubin 0.4 0.1 - 1.0 mg/dL    Alkaline Phosphatase 105 40 - 150 U/L    AST 25 10 - 40 U/L    ALT 9 (L) 10 - 44 U/L    eGFR 20 (A) >60 mL/min/1.73 m^2    Anion Gap 15 8 - 16 mmol/L   Lipase    Collection Time: 12/13/24  3:38 PM   Result Value Ref Range    Lipase 41 4 - 60 U/L   Troponin I    Collection Time: 12/13/24  3:38 PM   Result Value Ref Range    Troponin I 0.023 0.000 - 0.026 ng/mL   TSH    Collection Time: 12/13/24  3:38 PM   Result Value Ref Range    TSH 1.099 0.400 - 4.000 uIU/mL    Lactic acid, plasma    Collection Time: 12/13/24  3:38 PM   Result Value Ref Range    Lactate (Lactic Acid) 1.9 0.5 - 2.2 mmol/L   POCT glucose    Collection Time: 12/13/24  6:04 PM   Result Value Ref Range    POCT Glucose 76 70 - 110 mg/dL   Urinalysis, Reflex to Urine Culture Urine, Clean Catch    Collection Time: 12/13/24  6:56 PM    Specimen: Urine   Result Value Ref Range    Specimen UA Urine, Clean Catch     Color, UA Yellow Yellow, Straw, Lucia    Appearance, UA Clear Clear    pH, UA 5.0 5.0 - 8.0    Specific Gravity, UA 1.025 1.005 - 1.030    Protein, UA Trace (A) Negative    Glucose, UA Negative Negative    Ketones, UA Negative Negative    Bilirubin (UA) Negative Negative    Occult Blood UA Negative Negative    Nitrite, UA Negative Negative    Urobilinogen, UA 2.0-3.0 (A) <2.0 EU/dL    Leukocytes, UA Negative Negative   Drug screen panel, in-house    Collection Time: 12/13/24  6:56 PM   Result Value Ref Range    Benzodiazepines Negative Negative    Methadone metabolites Negative Negative    Cocaine (Metab.) Negative Negative    Opiate Scrn, Ur Negative Negative    Barbiturate Screen, Ur Negative Negative    Amphetamine Screen, Ur Negative Negative    THC Negative Negative    Phencyclidine Negative Negative    Creatinine, Urine 266.1 15.0 - 325.0 mg/dL    Toxicology Information SEE COMMENT    POCT glucose    Collection Time: 12/13/24 10:05 PM   Result Value Ref Range    POCT Glucose 82 70 - 110 mg/dL       RADIOLOGY  MRI Brain Without Contrast    Result Date: 12/13/2024  EXAMINATION: MRI BRAIN WITHOUT CONTRAST CLINICAL HISTORY: Stroke, follow up; TECHNIQUE: Multiplanar multisequence MR imaging of the brain was performed without contrast. COMPARISON: CT comparison FINDINGS: No acute ischemia identified. No mass effect or midline shift No hydrocephalus Mild chronic changes. Bilateral mastoid air cell effusions     No acute ischemia identified Electronically signed by: Cathy Olivares Date:    12/13/2024  Time:    22:51    CT Head Without Contrast    Result Date: 12/13/2024  EXAMINATION: CT HEAD WITHOUT CONTRAST CLINICAL HISTORY: Mental status change, unknown cause; TECHNIQUE: Low dose axial images were obtained through the head.  Coronal and sagittal reformations were also performed. Contrast was not administered. COMPARISON: None. FINDINGS: No displaced skull fracture.  Bilateral mastoid air cell effusion.  No acute intracranial hemorrhage or mass effect.  No hydrocephalus     No acute finding identified Electronically signed by: Cathy Olivares Date:    12/13/2024 Time:    16:15    X-Ray Chest AP Portable    Result Date: 12/13/2024  EXAMINATION: XR CHEST AP PORTABLE CLINICAL HISTORY: ams; FINDINGS: Single view of the chest.  Comparison 06/02/2022 Cardiac silhouette is normal.  The lungs demonstrate no evidence of active disease.  No evidence of pleural effusion or pneumothorax.  Bones appear intact.  Moderate degenerative changes and moderate atherosclerotic disease.  Study is limited by poor inspiration     No acute process seen. Electronically signed by: Carlos Millan MD Date:    12/13/2024 Time:    15:38      EKG    MICROBIOLOGY    MDM

## 2024-12-14 NOTE — PLAN OF CARE
P.T. EVAL COMPLETE.  PT CURRENTLY REQUIRES [facility] FOR BED MOBILITY, MODA X 2 FOR BED<>CHAIR TF.  P.T. RECOMMENDS MODERATE INTENSITY THERAPY UPON DISCHARGE

## 2024-12-14 NOTE — ASSESSMENT & PLAN NOTE
Patient is chronically on statin.will continue for now. Last Lipid Panel:   Lab Results   Component Value Date    CHOL 136 06/21/2024    HDL 56 06/21/2024    LDLCALC 68.0 06/21/2024    TRIG 60 06/21/2024    CHOLHDL 41.2 06/21/2024   Plan:  -Continue home medication  -low fat/low calorie diet

## 2024-12-14 NOTE — SUBJECTIVE & OBJECTIVE
Interval History: Looks much better, improving, talking, eating drinking well. All w/u normal so far. Her BP while sitting was 67/32 but pt was asymptomatic. She was immediately placed back in bed and her repeat BP was 124/57, comfortable on RA, AAOx3 now, sats 100% on RA. Labs improving, H/h 10/30, bun/cr down to 31/1.8, continue IVF and current tx.     Review of Systems   Unable to perform ROS: Patient nonverbal   Constitutional:  Positive for activity change, appetite change and fatigue.   All other systems reviewed and are negative.    Objective:     Vital Signs (Most Recent):  Temp: 97.7 °F (36.5 °C) (12/14/24 1619)  Pulse: 64 (12/14/24 1700)  Resp: 20 (12/14/24 1619)  BP: (!) 128/59 (12/14/24 1619)  SpO2: 97 % (12/14/24 1619) Vital Signs (24h Range):  Temp:  [97.7 °F (36.5 °C)-98.2 °F (36.8 °C)] 97.7 °F (36.5 °C)  Pulse:  [54-78] 64  Resp:  [16-20] 20  SpO2:  [97 %-100 %] 97 %  BP: ()/(32-70) 128/59     Weight: 80.7 kg (178 lb)  Body mass index is 32.56 kg/m².    Intake/Output Summary (Last 24 hours) at 12/14/2024 1758  Last data filed at 12/14/2024 0632  Gross per 24 hour   Intake 250 ml   Output --   Net 250 ml         Physical Exam  Vitals reviewed.   Constitutional:       General: She is not in acute distress.     Appearance: Normal appearance. She is obese. She is ill-appearing. She is not toxic-appearing or diaphoretic.   HENT:      Head: Normocephalic and atraumatic.      Right Ear: External ear normal.      Left Ear: External ear normal.      Nose: Nose normal. No congestion or rhinorrhea.      Mouth/Throat:      Mouth: Mucous membranes are moist.      Pharynx: Oropharynx is clear. No oropharyngeal exudate or posterior oropharyngeal erythema.   Eyes:      General: No scleral icterus.     Extraocular Movements: Extraocular movements intact.      Conjunctiva/sclera: Conjunctivae normal.      Pupils: Pupils are equal, round, and reactive to light.   Neck:      Vascular: No carotid bruit.  "  Cardiovascular:      Rate and Rhythm: Normal rate and regular rhythm.      Pulses: Normal pulses.      Heart sounds: Normal heart sounds. No murmur heard.     No friction rub. No gallop.   Pulmonary:      Effort: Pulmonary effort is normal. No respiratory distress.      Breath sounds: Normal breath sounds. No stridor. No wheezing, rhonchi or rales.   Chest:      Chest wall: No tenderness.   Abdominal:      General: Abdomen is flat. Bowel sounds are normal. There is no distension.      Palpations: Abdomen is soft. There is no mass.      Tenderness: There is no abdominal tenderness. There is no right CVA tenderness, left CVA tenderness, guarding or rebound.      Hernia: No hernia is present.   Musculoskeletal:         General: No swelling, tenderness, deformity or signs of injury. Normal range of motion.      Cervical back: Normal range of motion and neck supple. No rigidity or tenderness.      Right lower leg: No edema.      Left lower leg: No edema.   Lymphadenopathy:      Cervical: No cervical adenopathy.   Skin:     General: Skin is warm and dry.      Capillary Refill: Capillary refill takes less than 2 seconds.      Coloration: Skin is not jaundiced or pale.      Findings: No bruising, erythema, lesion or rash.   Neurological:      Mental Status: She is alert.      Cranial Nerves: No cranial nerve deficit.      Sensory: No sensory deficit.      Motor: Weakness present.      Coordination: Coordination normal.      Comments: Patient awake and alert, able to follow commands but is nonverbal.  4/5 strength throughout bilateral upper extremities.  3/5 strength throughout bilateral lower extremities.  Sensation to light touch grossly intact throughout.   Psychiatric:      Comments: Patient nonverbal             Significant Labs: All pertinent labs within the past 24 hours have been reviewed.  Blood Culture: No results for input(s): "LABBLOO" in the last 48 hours.  BMP:   Recent Labs   Lab 12/14/24  0551   GLU 89   NA " 138   K 3.8      CO2 21*   BUN 31*   CREATININE 1.8*   CALCIUM 9.0     CBC:   Recent Labs   Lab 12/13/24  1538 12/14/24  0551   WBC 7.31 5.22   HGB 10.4* 10.0*   HCT 31.4* 30.5*    314     CMP:   Recent Labs   Lab 12/13/24  1538 12/14/24  0551   * 138   K 4.2 3.8   CL 98 104   CO2 21* 21*   GLU 99 89   BUN 36* 31*   CREATININE 2.5* 1.8*   CALCIUM 9.6 9.0   PROT 8.4 7.5   ALBUMIN 3.7 3.2*   BILITOT 0.4 0.5   ALKPHOS 105 95   AST 25 21   ALT 9* 9*   ANIONGAP 15 13     Lactic Acid:   Recent Labs   Lab 12/13/24  1538   LACTATE 1.9     Significant Imaging: I have reviewed all pertinent imaging results/findings within the past 24 hours.

## 2024-12-14 NOTE — ASSESSMENT & PLAN NOTE
Chronic, controlled.  Latest blood pressure and vitals reviewed-   Temp:  [96.7 °F (35.9 °C)-98.1 °F (36.7 °C)]   Pulse:  [68-78]   Resp:  [16-20]   BP: (137-147)/(61-86)   SpO2:  [99 %-100 %] .   Home meds for hypertension were reviewed and noted below.   Hypertension Medications               amLODIPine (NORVASC) 10 MG tablet Take 1 tablet (10 mg total) by mouth once daily.    hydroCHLOROthiazide (HYDRODIURIL) 25 MG tablet Take 1 tablet (25 mg total) by mouth once daily.    losartan (COZAAR) 100 MG tablet Take 1 tablet (100 mg total) by mouth once daily.    metoprolol succinate (TOPROL-XL) 25 MG 24 hr tablet Take 1 tablet (25 mg total) by mouth once daily.     While in the hospital, will manage blood pressure as follows; Adjust home antihypertensive regimen as follows- holding losartan and hydrochlorothiazide in setting of JARAD.  Resuming amlodipine and metoprolol with p.r.n. IV hydralazine.    Will utilize p.r.n. blood pressure medication only if patient's blood pressure greater than  180/110 and she develops symptoms such as worsening chest pain or shortness of breath.

## 2024-12-14 NOTE — ASSESSMENT & PLAN NOTE
Body mass index is 32.02 kg/m². Morbid obesity complicates all aspects of disease management from diagnostic modalities to treatment. Weight loss encouraged and health benefits explained to patient.

## 2024-12-14 NOTE — ASSESSMENT & PLAN NOTE
Patient presented with acute onset mutism and inability to ambulate x1 day duration.  Patient does have history of schizophrenia but reported to interact, speak, and ambulate at baseline.  Initial workup in the ED revealed patient to be afebrile without leukocytosis, hemodynamically stable, urine toxicology and UA negative, chest x-ray and CT head negative for acute findings.  Patient did present with JARAD of creatinine/GFR measuring 2.5/20, BUN 36.  Patient awake, alert, following commands, however nonverbal during assessment.  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  MRI and echo pending.  Plan:  -telemetry  -continue home medications  -f/u MRI and echo  -continued treatment of JARAD noted below  -consider Neurology and/or Psych consult for further evaluation

## 2024-12-14 NOTE — HOSPITAL COURSE
69 y.o. AAF, with Hx Arthritis, CHF, DM 2 w retinopathy, HTN, HLP, Schizophrenia, and Seizures who presented to the ED w AMS/change in baseline after patient reported not being able to walk or talk x1 day duration. Pt is nonverbal. Daughter reported patient often spits things up out of her mouth and has not been walking or talking like she usually does raising concern and prompting them to bring patient to the ED. Patient does have history of schizophrenia but is still interactive. Initial workup in the ED revealed patient to be afebrile without leukocytosis, hemodynamically stable, creatinine/GFR measuring 2.5/20, troponin, lactic acid, TSH, urine toxicology, UA, chest x-ray, CT head, and MRI brain negative. Patient able to follow commands and track but remains nonverbal. Patient admitted to Hospital Medicine inpatient for continued medical management.     12/14- Pt seen and examined, Looks much better, improving, talking, eating drinking well. All w/u normal so far. Her BP while sitting was 67/32 but pt was asymptomatic. She was immediately placed back in bed and her repeat BP was 124/57, comfortable on RA, AAOx3 now, sats 100% on RA. Labs improving, H/h 10/30, bun/cr down to 31/1.8, continue IVF and current tx.     12/15- looks and feels much better, awake, alert and talking, oriented, eating drinking well, walking around well, ready to go home now. Was getting IVF and Bun/Cr improved to 30/1.6. will hold her Losartan and HCTZ and KCl until seen by her PCP in a week with repeat renal function panel. She was seen and examined and deemed stable for discharge home with HH today.

## 2024-12-14 NOTE — PT/OT/SLP EVAL
Occupational Therapy   Evaluation    Name: Carisa Curry  MRN: [id number]  Admitting Diagnosis: AMS (altered mental status)  Recent Surgery: * No surgery found *      Recommendations:     Discharge Recommendations: Moderate Intensity Therapy  Discharge Equipment Recommendations:  to be determined by next level of care  Barriers to discharge:  Other (Comment) (TBD)    Assessment:     Carisa Curry is a 69 y.o. female with a medical diagnosis of AMS (altered mental status).  She presents with SELF CARE DEBILITY . Performance deficits affecting function: weakness, impaired endurance, impaired self care skills, impaired functional mobility, gait instability, impaired balance, decreased upper extremity function, decreased lower extremity function, decreased safety awareness.      Rehab Prognosis: Fair; patient would benefit from acute skilled OT services to address these deficits and reach maximum level of function.       Plan:     Patient to be seen 2 x/week to address the above listed problems via self-care/home management, therapeutic activities, therapeutic exercises  Plan of Care Expires: 12/28/24  Plan of Care Reviewed with: patient    Subjective     Chief Complaint: NONE  Patient/Family Comments/goals:  NONE STATED.    Occupational Profile:  Living Environment: PATIENT LIVES WITH HER 2 SISTERS  IN A 1 STORY HOUSE WITH 2 STEPS WITH 1 RAIL(S).   Previous level of function: PATIENT STATED SHE HAS  A CAREGIVER WHO DRESSES AND BATHES HER ON WEDNESDAYS, AND ONE OF HER SISTERS (A) HER WITH DRIVING AND GR[employer]ERY SHOPPING.  Roles and Routines: PATIENT STATED SHE LIKES TO WALK IN HER YARD ON THE PATHWAY.  Equipment Used at Home: walker, rolling  Assistance upon Discharge: SISTER    Pain/Comfort:  Pain Rating 1: 0/10    Patients cultural, spiritual, Voodoo conflicts given the current situation: no    Objective:     Communicated with: NURSE prior to session.  Patient found supine with telemetry, peripheral IV, PureWick,  "bed alarm upon OT entry to room.    General Precautions: Standard, fall  Orthopedic Precautions: N/A  Braces: N/A  Respiratory Status: Room air    Occupational Performance:    Bed Mobility:    MIN (A) WITH ALL LEVELS    Functional Mobility/Transfers:  MOD (A) OF 2 PERSONS WITH EOB > STAND > STAND PIVOT TO B/S CHAIR.  Functional Mobility: MOD (A) OF 2 PERSONS TO SIDE STEP APPROX X4 STEPS    Activities of Daily Living:  Feeding:  independence TO OPEN FRUITCUP AND TO SCOOP FRUIT CONTAINER TO MOUTH  Grooming: stand by assistance WITH ORAL HYGIENE SEATED IN B/S CHAIR  Lower Body Dressing: (D) TO DON SOCKS.    Cognitive/Visual Perceptual:  PATIENT ORIENTED TO PLACE, MONTH BUT NOT YEAR.  PATIENT REQUIRED CONSTANT CUES FOR SAFETY WITH FUNC MOBILITY.    Physical Exam:  Balance:    -       MOD (A) OF 2 PERSONS FOR FUNC MOBILITY.  Upper Extremity Range of Motion:     -       Right Upper Extremity: WFL  -       Left Upper Extremity: WFL    AMPAC 6 Click ADL:  AMPAC Total Score: 16    Treatment & Education:  OT EVAL PERFORMED.  PATIENT EDUCATED RE:  PURPOSE OF OT AND IMPORTANCE OF "CALL--DON'T FALL" TO DECREASE FALL RISK.  PATIENT PARTICIPATED IN FUNC MOBILITY AND SELF CARE TASKS.     Patient left up in chair with all lines intact, call button in reach, and PCA present    GOALS:   Multidisciplinary Problems       Occupational Therapy Goals          Problem: Occupational Therapy    Goal Priority Disciplines Outcome Interventions   Occupational Therapy Goal     OT, PT/OT     Description: LTG'S TO BE MET IN 14 DAYS (12/28/24)    1)  PATIENT WILL PERFORM UB DRESSING WITH SET UP TO DECREASE (D) ON CAREGIVER.    2)  PATIENT WILL PERFORM LB DRESSING WITH MIN (A) TO DECREASE (D) ON CAREGIVER..    3)  PATIENT WILL PERFORM TOILET T/F WITH MIN (A) TO DECREASE (D) ON CAREGIVER..    4)  PATIENT WILL PERFORM STANDING AT SINK X5-X10 MIN WITH SBA WITH RW AS NEEDED TO PERFORM SIMPLE GROOMING/HYGIENE WITH NO LOB.                        History: "     Past Medical History:   Diagnosis Date    Arthritis     CHF (congestive heart failure)     Diabetes mellitus, type 2     Diabetic retinopathy     Hyperlipidemia     Hypertension     Schizophrenia     Seizures     reported per pt and sister, nothing since childhood         Past Surgical History:   Procedure Laterality Date    EYE SURGERY      INJECTION OF ANESTHETIC AGENT AROUND NERVE Bilateral 12/09/2019    Procedure: Bilateral Genicular nerve block (DIAGNOSTIC, NO STEROID) with RN IV sedation;  Surgeon: Butch Chao MD;  Location: [facility];  Service: Pain Management;  Laterality: Bilateral;    RADIOFREQUENCY THERMOCOAGULATION Right 12/23/2019    Procedure: Right Genicular Nerve RFA (COOLED) with RN IV sedation;  Surgeon: Butch Chao MD;  Location: [facility];  Service: Pain Management;  Laterality: Right;    RADIOFREQUENCY THERMOCOAGULATION Left 01/06/2020    Procedure: Left Genicular Nerve RFA (COOLED) with RN IV sedation;  Surgeon: Butch Chao MD;  Location: [facility];  Service: Pain Management;  Laterality: Left;    SPINE SURGERY         Time Tracking:     OT Date of Treatment: 12/14/24  OT Start Time: 0949  OT Stop Time: 1014  OT Total Time (min): 25 min    Billable Minutes:Evaluation 7  Self Care/Home Management 10  Therapeutic Activity 8    12/14/2024

## 2024-12-14 NOTE — ASSESSMENT & PLAN NOTE
Baseline creatinine is  1.3 . Most recent creatinine and eGFR are listed below.  Recent Labs     12/13/24  1538   CREATININE 2.5*   EGFRNORACEVR 20*   Plan  -JARAD is  currently undergoing medical management  -Avoid nephrotoxins and renally dose meds for GFR listed above  -Monitor urine output, serial BMP, and adjust therapy as needed

## 2024-12-14 NOTE — PLAN OF CARE
OT EVAL COMPLETE.  PATIENT WOULD BENEFIT FROM CONT SKILLED OT INTERVENTION.  OT RECOMMENDS SNF AT D/C.

## 2024-12-14 NOTE — PLAN OF CARE
Discussed plan of care with patient and this patient was able to , verbalized understanding.  Patient remains free from injury.  Safety and comfort precautions maintained this shift.   Call light and personal belongings within reach, bed in low position with bed wheels locked.  No s/s of acute distress.   Purposeful rounding continued this shift.  Pain levels ar controlled per MD order. IVF infusing.  Cardiac monitoring in place.  Diet orders continued.  Blood glucose monitoring continued this shift.  Vital signs continued per order.  Patient mobility status with equipment assist  Chart and orders review completed.   Patient education about care completed.       Problem: Adult Inpatient Plan of Care  Goal: Plan of Care Review  Outcome: Progressing  Goal: Patient-Specific Goal (Individualized)  Outcome: Progressing  Goal: Absence of Hospital-Acquired Illness or Injury  Outcome: Progressing  Goal: Optimal Comfort and Wellbeing  Outcome: Progressing  Goal: Readiness for Transition of Care  Outcome: Progressing     Problem: Diabetes Comorbidity  Goal: Blood Glucose Level Within Targeted Range  Outcome: Progressing

## 2024-12-14 NOTE — H&P
[facility]  History & Physical    Patient Name: Carisa Curry  MRN: [id number]  Patient Class: IP- Inpatient  Admission Date: 12/13/2024  Attending Physician: Racquel Munson MD   Primary Care Provider: Guadalupe Guzman DO         Patient information was obtained from patient, relative(s), past medical records, and ER records.     Subjective:     Principal Problem:AMS (altered mental status)    Chief Complaint:   Chief Complaint   Patient presents with    Altered Mental Status     Unable to walk and not talking x one day. Awake, alert. Follows simple commands.        HPI: Carisa Curry is a 69 y.o. female with a PMH  has a past medical history of Arthritis, CHF (congestive heart failure), Diabetes mellitus, type 2, Diabetic retinopathy, Hyperlipidemia, Hypertension, Schizophrenia, and Seizures. who presented to the ED for further evaluation of altered mental status/change in baseline after patient reported not being able to walk or talk x1 day duration.  History was obtained through chart review, ED sign-out, and family as patient nonverbal at time of bedside assessment.  Daughter reported patient often spits things up out of her mouth and has not been walking or talking like she usually does raising concern and prompting them to bring patient to the ED for further evaluation.  Patient does have history of schizophrenia but is still interactive.  No known alleviating or aggravating factors were noted with all other review of systems negative except as noted above.  Prior to onset of symptoms, patient was in her usual state of health with no other concerns or complaints.  Initial workup in the ED revealed patient to be afebrile without leukocytosis, hemodynamically stable, creatinine/GFR measuring 2.5/20, troponin, lactic acid, TSH, urine toxicology, UA, chest x-ray, CT head, and MRI brain negative.  Patient able to follow commands and track but remains nonverbal.  Patient  admitted to Hospital Medicine inpatient for continued medical management.    PCP: Guadalupe Guzman       Past Medical History:   Diagnosis Date    Arthritis     CHF (congestive heart failure)     Diabetes mellitus, type 2     Diabetic retinopathy     Hyperlipidemia     Hypertension     Schizophrenia     Seizures     reported per pt and sister, nothing since childhood       Past Surgical History:   Procedure Laterality Date    EYE SURGERY      INJECTION OF ANESTHETIC AGENT AROUND NERVE Bilateral 12/09/2019    Procedure: Bilateral Genicular nerve block (DIAGNOSTIC, NO STEROID) with RN IV sedation;  Surgeon: Butch Chao MD;  Location: [facility];  Service: Pain Management;  Laterality: Bilateral;    RADIOFREQUENCY THERMOCOAGULATION Right 12/23/2019    Procedure: Right Genicular Nerve RFA (COOLED) with RN IV sedation;  Surgeon: Butch Chao MD;  Location: [facility];  Service: Pain Management;  Laterality: Right;    RADIOFREQUENCY THERMOCOAGULATION Left 01/06/2020    Procedure: Left Genicular Nerve RFA (COOLED) with RN IV sedation;  Surgeon: Butch Chao MD;  Location: [facility];  Service: Pain Management;  Laterality: Left;    SPINE SURGERY         Review of patient's allergies indicates:  No Known Allergies    No current facility-administered medications on file prior to encounter.     Current Outpatient Medications on File Prior to Encounter   Medication Sig    alendronate (FOSAMAX) 70 MG tablet Take 1 tablet (70 mg total) by mouth once a week. (Patient taking differently: Take 70 mg by mouth once a week. Thursday)    amLODIPine (NORVASC) 10 MG tablet Take 1 tablet (10 mg total) by mouth once daily.    ARIPiprazole (ABILIFY) 10 MG Tab Take 1 tablet (10 mg total) by mouth once daily.    benztropine (COGENTIN) 1 MG tablet Take 1/2 to 1 tablet twice daily as needed for side effects. (Patient taking differently: Take 1 mg by mouth once daily. Take 1/2 to 1 tablet twice daily as needed for side  "effects.)    cholecalciferol, vitamin D3, (VITAMIN D3) 25 mcg (1,000 unit) capsule Take 1 capsule (1,000 Units total) by mouth once daily.    diclofenac sodium (VOLTAREN) 1 % Gel Apply 2 g topically 4 (four) times daily. for 10 days    hydroCHLOROthiazide (HYDRODIURIL) 25 MG tablet Take 1 tablet (25 mg total) by mouth once daily.    losartan (COZAAR) 100 MG tablet Take 1 tablet (100 mg total) by mouth once daily.    metFORMIN (GLUCOPHAGE-XR) 500 MG ER 24hr tablet Take 1 tablet (500 mg total) by mouth 2 (two) times daily with meals.    metoprolol succinate (TOPROL-XL) 25 MG 24 hr tablet Take 1 tablet (25 mg total) by mouth once daily.    nystatin (MYCOSTATIN) powder APPLY TOPICALLY 4(FOUR) TIMES DAILY (Patient taking differently: Apply topically 4 (four) times daily.)    potassium chloride SA (K-DUR,KLOR-CON M) 10 MEQ tablet TAKE ONE TABLET BY MOUTH ONE TIME DAILY (Patient taking differently: Take 10 mEq by mouth once daily.)    pravastatin (PRAVACHOL) 40 MG tablet Take 1 tablet (40 mg total) by mouth once daily.    traZODone (DESYREL) 100 MG tablet Take 1/2 to 1 tablet at bedtime as needed for sleep. (Patient taking differently: Take 100 mg by mouth nightly as needed. 1/2-1 nightly as needed)    blood sugar diagnostic (FREESTYLE LITE STRIPS) Strp 1 strip by Misc.(Non-Drug; Combo Route) route 2 (two) times a day. TEST BLOOD GLUCOSE 1 TIME DAILY    blood sugar diagnostic Strp To check BG 1 times daily, to use with insurance preferred meter    blood-glucose meter kit To check BG 1 times daily, to use with insurance preferred meter    FREESTYLE LANCETS 28 gauge lancets Apply topically once daily.    insulin needles, disposable, (PEN NEEDLE) 31 X 5/16 " Ndle Once a day insulin injection.    lancets Misc To check BG 2 times daily, to use with insurance preferred meter    lancets Misc To check BG 1 times daily, to use with insurance preferred meter     Family History       Problem Relation (Age of Onset)    Cancer Father "    Cataracts Mother    Diabetes Father, Maternal Aunt, Paternal Aunt, Paternal Uncle    Heart disease Mother, Paternal Aunt, Paternal Uncle    Heart failure Mother    Hypertension Mother, Maternal Aunt, Paternal Aunt    Kidney disease Mother    Stroke Mother          Tobacco Use    Smoking status: Former     Types: Cigarettes    Smokeless tobacco: Never   Substance and Sexual Activity    Alcohol use: No     Alcohol/week: 0.0 standard drinks of alcohol    Drug use: No    Sexual activity: Never     Review of Systems   Unable to perform ROS: Patient nonverbal   All other systems reviewed and are negative.    Objective:     Vital Signs (Most Recent):  Temp: 98 °F (36.7 °C) (12/13/24 2349)  Pulse: 68 (12/13/24 2349)  Resp: 18 (12/13/24 2349)  BP: (!) 142/70 (12/13/24 2349)  SpO2: 99 % (12/13/24 2349) Vital Signs (24h Range):  Temp:  [96.7 °F (35.9 °C)-98.1 °F (36.7 °C)] 98 °F (36.7 °C)  Pulse:  [68-78] 68  Resp:  [16-20] 18  SpO2:  [99 %-100 %] 99 %  BP: (137-147)/(61-86) 142/70     Weight: 79.4 kg (175 lb 0.7 oz)  Body mass index is 32.02 kg/m².     Physical Exam  Vitals reviewed.   Constitutional:       General: She is not in acute distress.     Appearance: Normal appearance. She is obese. She is not ill-appearing, toxic-appearing or diaphoretic.   HENT:      Head: Normocephalic and atraumatic.      Right Ear: External ear normal.      Left Ear: External ear normal.      Nose: Nose normal. No congestion or rhinorrhea.      Mouth/Throat:      Mouth: Mucous membranes are moist.      Pharynx: Oropharynx is clear. No oropharyngeal exudate or posterior oropharyngeal erythema.   Eyes:      General: No scleral icterus.     Extraocular Movements: Extraocular movements intact.      Conjunctiva/sclera: Conjunctivae normal.      Pupils: Pupils are equal, round, and reactive to light.   Neck:      Vascular: No carotid bruit.   Cardiovascular:      Rate and Rhythm: Normal rate and regular rhythm.      Pulses: Normal pulses.       Heart sounds: Normal heart sounds. No murmur heard.     No friction rub. No gallop.   Pulmonary:      Effort: Pulmonary effort is normal. No respiratory distress.      Breath sounds: Normal breath sounds. No stridor. No wheezing, rhonchi or rales.   Chest:      Chest wall: No tenderness.   Abdominal:      General: Abdomen is flat. Bowel sounds are normal. There is no distension.      Palpations: Abdomen is soft. There is no mass.      Tenderness: There is no abdominal tenderness. There is no right CVA tenderness, left CVA tenderness, guarding or rebound.      Hernia: No hernia is present.   Musculoskeletal:         General: No swelling, tenderness, deformity or signs of injury. Normal range of motion.      Cervical back: Normal range of motion and neck supple. No rigidity or tenderness.      Right lower leg: No edema.      Left lower leg: No edema.   Lymphadenopathy:      Cervical: No cervical adenopathy.   Skin:     General: Skin is warm and dry.      Capillary Refill: Capillary refill takes less than 2 seconds.      Coloration: Skin is not jaundiced or pale.      Findings: No bruising, erythema, lesion or rash.   Neurological:      Mental Status: She is alert.      Cranial Nerves: No cranial nerve deficit.      Sensory: No sensory deficit.      Motor: Weakness present.      Coordination: Coordination normal.      Comments: Patient awake and alert, able to follow commands but is nonverbal.  4/5 strength throughout bilateral upper extremities.  3/5 strength throughout bilateral lower extremities.  Sensation to light touch grossly intact throughout.   Psychiatric:         Mood and Affect: Mood normal.         Behavior: Behavior normal.         Thought Content: Thought content normal.         Judgment: Judgment normal.      Comments: Patient nonverbal              CRANIAL NERVES     CN III, IV, VI   Pupils are equal, round, and reactive to light.       Significant Labs: All pertinent labs within the past 24 hours have  been reviewed.    Significant Imaging: I have reviewed all pertinent imaging results/findings within the past 24 hours.    LABS:  Recent Results (from the past 24 hours)   POCT glucose    Collection Time: 12/13/24  3:11 PM   Result Value Ref Range    POCT Glucose 109 70 - 110 mg/dL   CBC auto differential    Collection Time: 12/13/24  3:38 PM   Result Value Ref Range    WBC 7.31 3.90 - 12.70 K/uL    RBC 3.80 (L) 4.00 - 5.40 M/uL    Hemoglobin 10.4 (L) 12.0 - 16.0 g/dL    Hematocrit 31.4 (L) 37.0 - 48.5 %    MCV 83 82 - 98 fL    MCH 27.4 27.0 - 31.0 pg    MCHC 33.1 32.0 - 36.0 g/dL    RDW 14.3 11.5 - 14.5 %    Platelets 315 150 - 450 K/uL    MPV 10.9 9.2 - 12.9 fL    Immature Granulocytes 0.3 0.0 - 0.5 %    Gran # (ANC) 5.1 1.8 - 7.7 K/uL    Immature Grans (Abs) 0.02 0.00 - 0.04 K/uL    Lymph # 1.3 1.0 - 4.8 K/uL    Mono # 0.8 0.3 - 1.0 K/uL    Eos # 0.1 0.0 - 0.5 K/uL    Baso # 0.05 0.00 - 0.20 K/uL    nRBC 0 0 /100 WBC    Gran % 69.3 38.0 - 73.0 %    Lymph % 17.9 (L) 18.0 - 48.0 %    Mono % 10.4 4.0 - 15.0 %    Eosinophil % 1.4 0.0 - 8.0 %    Basophil % 0.7 0.0 - 1.9 %    Differential Method Automated    Comprehensive metabolic panel    Collection Time: 12/13/24  3:38 PM   Result Value Ref Range    Sodium 134 (L) 136 - 145 mmol/L    Potassium 4.2 3.5 - 5.1 mmol/L    Chloride 98 95 - 110 mmol/L    CO2 21 (L) 23 - 29 mmol/L    Glucose 99 70 - 110 mg/dL    BUN 36 (H) 8 - 23 mg/dL    Creatinine 2.5 (H) 0.5 - 1.4 mg/dL    Calcium 9.6 8.7 - 10.5 mg/dL    Total Protein 8.4 6.0 - 8.4 g/dL    Albumin 3.7 3.5 - 5.2 g/dL    Total Bilirubin 0.4 0.1 - 1.0 mg/dL    Alkaline Phosphatase 105 40 - 150 U/L    AST 25 10 - 40 U/L    ALT 9 (L) 10 - 44 U/L    eGFR 20 (A) >60 mL/min/1.73 m^2    Anion Gap 15 8 - 16 mmol/L   Lipase    Collection Time: 12/13/24  3:38 PM   Result Value Ref Range    Lipase 41 4 - 60 U/L   Troponin I    Collection Time: 12/13/24  3:38 PM   Result Value Ref Range    Troponin I 0.023 0.000 - 0.026 ng/mL   TSH     Collection Time: 12/13/24  3:38 PM   Result Value Ref Range    TSH 1.099 0.400 - 4.000 uIU/mL   Lactic acid, plasma    Collection Time: 12/13/24  3:38 PM   Result Value Ref Range    Lactate (Lactic Acid) 1.9 0.5 - 2.2 mmol/L   POCT glucose    Collection Time: 12/13/24  6:04 PM   Result Value Ref Range    POCT Glucose 76 70 - 110 mg/dL   Urinalysis, Reflex to Urine Culture Urine, Clean Catch    Collection Time: 12/13/24  6:56 PM    Specimen: Urine   Result Value Ref Range    Specimen UA Urine, Clean Catch     Color, UA Yellow Yellow, Straw, Lucia    Appearance, UA Clear Clear    pH, UA 5.0 5.0 - 8.0    Specific Gravity, UA 1.025 1.005 - 1.030    Protein, UA Trace (A) Negative    Glucose, UA Negative Negative    Ketones, UA Negative Negative    Bilirubin (UA) Negative Negative    Occult Blood UA Negative Negative    Nitrite, UA Negative Negative    Urobilinogen, UA 2.0-3.0 (A) <2.0 EU/dL    Leukocytes, UA Negative Negative   Drug screen panel, in-house    Collection Time: 12/13/24  6:56 PM   Result Value Ref Range    Benzodiazepines Negative Negative    Methadone metabolites Negative Negative    Cocaine (Metab.) Negative Negative    Opiate Scrn, Ur Negative Negative    Barbiturate Screen, Ur Negative Negative    Amphetamine Screen, Ur Negative Negative    THC Negative Negative    Phencyclidine Negative Negative    Creatinine, Urine 266.1 15.0 - 325.0 mg/dL    Toxicology Information SEE COMMENT    POCT glucose    Collection Time: 12/13/24 10:05 PM   Result Value Ref Range    POCT Glucose 82 70 - 110 mg/dL       RADIOLOGY  MRI Brain Without Contrast    Result Date: 12/13/2024  EXAMINATION: MRI BRAIN WITHOUT CONTRAST CLINICAL HISTORY: Stroke, follow up; TECHNIQUE: Multiplanar multisequence MR imaging of the brain was performed without contrast. COMPARISON: CT comparison FINDINGS: No acute ischemia identified. No mass effect or midline shift No hydrocephalus Mild chronic changes. Bilateral mastoid air cell effusions      No acute ischemia identified Electronically signed by: Cathy Olivares Date:    12/13/2024 Time:    22:51    CT Head Without Contrast    Result Date: 12/13/2024  EXAMINATION: CT HEAD WITHOUT CONTRAST CLINICAL HISTORY: Mental status change, unknown cause; TECHNIQUE: Low dose axial images were obtained through the head.  Coronal and sagittal reformations were also performed. Contrast was not administered. COMPARISON: None. FINDINGS: No displaced skull fracture.  Bilateral mastoid air cell effusion.  No acute intracranial hemorrhage or mass effect.  No hydrocephalus     No acute finding identified Electronically signed by: Cathy Olivares Date:    12/13/2024 Time:    16:15    X-Ray Chest AP Portable    Result Date: 12/13/2024  EXAMINATION: XR CHEST AP PORTABLE CLINICAL HISTORY: ams; FINDINGS: Single view of the chest.  Comparison 06/02/2022 Cardiac silhouette is normal.  The lungs demonstrate no evidence of active disease.  No evidence of pleural effusion or pneumothorax.  Bones appear intact.  Moderate degenerative changes and moderate atherosclerotic disease.  Study is limited by poor inspiration     No acute process seen. Electronically signed by: Carlos Millan MD Date:    12/13/2024 Time:    15:38      EKG    MICROBIOLOGY    [facility]    Assessment/Plan:     * AMS (altered mental status)  Patient presented with acute onset mutism and inability to ambulate x1 day duration.  Patient does have history of schizophrenia but reported to interact, speak, and ambulate at baseline.  Initial workup in the ED revealed patient to be afebrile without leukocytosis, hemodynamically stable, urine toxicology and UA negative, chest x-ray and CT head negative for acute findings.  Patient did present with JARAD of creatinine/GFR measuring 2.5/20, BUN 36.  Patient awake, alert, following commands, however nonverbal during assessment.  Presentation concerning for selective mutism/psychiatric exacerbation however currently undergoing  CVA rule out.  MRI and echo pending.  Plan:  -telemetry  -continue home medications  -f/u MRI and echo  -continued treatment of JARAD noted below  -consider Neurology and/or Psych consult for further evaluation      Schizophrenia  Chronic. Not in acute exacerbation and is without evidence of suicidal/homicidal ideations.  Patient currently admitted for AMS workup presenting with mutism and inability to walk.    Plan:  -Continue home medications   -Continue CVA workup as noted above  -Consider Neurology and/or Psychiatric consult for further evaluation      Acute kidney injury superimposed on chronic kidney disease  Baseline creatinine is  1.3 . Most recent creatinine and eGFR are listed below.  Recent Labs     12/13/24  1538   CREATININE 2.5*   EGFRNORACEVR 20*   Plan  -JARAD is  currently undergoing medical management  -Avoid nephrotoxins and renally dose meds for GFR listed above  -Monitor urine output, serial BMP, and adjust therapy as needed      Controlled type 2 diabetes mellitus with microalbuminuria, without long-term current use of insulin  Patient's FSGs are controlled on current medication regimen.  Last A1c reviewed-   Lab Results   Component Value Date    HGBA1C 5.2 06/21/2024     Most recent fingerstick glucose reviewed-   Recent Labs   Lab 12/13/24  1511 12/13/24  1804 12/13/24  2205   POCTGLUCOSE 109 76 82     Current correctional scale  Low  Titrate as needed  anti-hyperglycemic dose as follows-   Antihyperglycemics (From admission, onward)      Start     Stop Route Frequency Ordered    12/13/24 2229  insulin aspart U-100 pen 0-5 Units         -- SubQ Before meals & nightly PRN 12/13/24 2130        Plan:  -SSI  -A1c  -Accu-checks  -Hold oral hypoglycemics while patient is in the hospital  -Continue home long-acting insulin at 20% decrease, titrate up as needed  -Hypoglycemic protocol        Hypertension goal BP (blood pressure) < 130/80  Chronic, controlled.  Latest blood pressure and vitals reviewed-  "  Temp:  [96.7 °F (35.9 °C)-98.1 °F (36.7 °C)]   Pulse:  [68-78]   Resp:  [16-20]   BP: (137-147)/(61-86)   SpO2:  [99 %-100 %] .   Home meds for hypertension were reviewed and noted below.   Hypertension Medications               amLODIPine (NORVASC) 10 MG tablet Take 1 tablet (10 mg total) by mouth once daily.    hydroCHLOROthiazide (HYDRODIURIL) 25 MG tablet Take 1 tablet (25 mg total) by mouth once daily.    losartan (COZAAR) 100 MG tablet Take 1 tablet (100 mg total) by mouth once daily.    metoprolol succinate (TOPROL-XL) 25 MG 24 hr tablet Take 1 tablet (25 mg total) by mouth once daily.     While in the hospital, will manage blood pressure as follows; Adjust home antihypertensive regimen as follows- holding losartan and hydrochlorothiazide in setting of JARAD.  Resuming amlodipine and metoprolol with p.r.n. IV hydralazine.    Will utilize p.r.n. blood pressure medication only if patient's blood pressure greater than  180/110 and she develops symptoms such as worsening chest pain or shortness of breath.      CHF (congestive heart failure)  Patient has  reported history of  heart failure (unknown type) that is Chronic. On presentation their CHF was well compensated. Most recent BNP and echo results are listed below.  No results for input(s): "BNP" in the last 72 hours.  Latest ECHO  No results found for this or any previous visit.    Current Heart Failure Medications       Plan  -Monitor strict I&Os and daily weights.    -Place on telemetry  -Low sodium diet  -Place on fluid restriction of 1.5 L.   -Cardiology has not been consulted  -The patient's volume status is at their baseline  -Continue home medications  -f/u echo      Hyperlipidemia LDL goal <70  Patient is chronically on statin.will continue for now. Last Lipid Panel:   Lab Results   Component Value Date    CHOL 136 06/21/2024    HDL 56 06/21/2024    LDLCALC 68.0 06/21/2024    TRIG 60 06/21/2024    CHOLHDL 41.2 06/21/2024   Plan:  -Continue home " medication  -low fat/low calorie diet      Class 1 obesity due to excess calories with serious comorbidity and body mass index (BMI) of 32.0 to 32.9 in adult  Body mass index is 32.02 kg/m². Morbid obesity complicates all aspects of disease management from diagnostic modalities to treatment. Weight loss encouraged and health benefits explained to patient.         VTE Risk Mitigation (From admission, onward)           Ordered     enoxaparin injection 30 mg  Daily         12/13/24 2130     IP VTE HIGH RISK PATIENT  Once         12/13/24 2130     Place sequential compression device  Until discontinued         12/13/24 2130                  //Core Measures   -DVT proph: SCDs, Lovenox   -Code status: Full    -Surrogate: daughter       Components of this note were documented using a voice recognition system and are subject to errors not corrected at the time the document was proof read. Please contact the author for any clarifications.        Ever Corrales MD  Department of Hospital Medicine  [facility] - Telemetry ([facility])

## 2024-12-14 NOTE — HPI
Carisa Curry is a 69 y.o. female with a PMH  has a past medical history of Arthritis, CHF (congestive heart failure), Diabetes mellitus, type 2, Diabetic retinopathy, Hyperlipidemia, Hypertension, Schizophrenia, and Seizures. who presented to the ED for further evaluation of altered mental status/change in baseline after patient reported not being able to walk or talk x1 day duration.  History was obtained through chart review, ED sign-out, and family as patient nonverbal at time of bedside assessment.  Daughter reported patient often spits things up out of her mouth and has not been walking or talking like she usually does raising concern and prompting them to bring patient to the ED for further evaluation.  Patient does have history of schizophrenia but is still interactive.  No known alleviating or aggravating factors were noted with all other review of systems negative except as noted above.  Prior to onset of symptoms, patient was in her usual state of health with no other concerns or complaints.  Initial workup in the ED revealed patient to be afebrile without leukocytosis, hemodynamically stable, creatinine/GFR measuring 2.5/20, troponin, lactic acid, TSH, urine toxicology, UA, chest x-ray, CT head, and MRI brain negative.  Patient able to follow commands and track but remains nonverbal.  Patient admitted to Hospital Medicine inpatient for continued medical management.    PCP: Guadalupe Guzman

## 2024-12-14 NOTE — ASSESSMENT & PLAN NOTE
Chronic. Not in acute exacerbation and is without evidence of suicidal/homicidal ideations.  Patient currently admitted for AMS workup presenting with mutism and inability to walk.    Plan:  -Continue home medications   -Continue CVA workup as noted above  -Consider Neurology and/or Psychiatric consult for further evaluation

## 2024-12-15 VITALS
OXYGEN SATURATION: 93 % | DIASTOLIC BLOOD PRESSURE: 63 MMHG | TEMPERATURE: 98 F | SYSTOLIC BLOOD PRESSURE: 138 MMHG | RESPIRATION RATE: 18 BRPM | HEART RATE: 60 BPM | WEIGHT: 178 LBS | BODY MASS INDEX: 32.76 KG/M2 | HEIGHT: 62 IN

## 2024-12-15 PROBLEM — N18.9 ACUTE KIDNEY INJURY SUPERIMPOSED ON CHRONIC KIDNEY DISEASE: Status: RESOLVED | Noted: 2024-12-14 | Resolved: 2024-12-15

## 2024-12-15 PROBLEM — N17.9 ACUTE KIDNEY INJURY SUPERIMPOSED ON CHRONIC KIDNEY DISEASE: Status: RESOLVED | Noted: 2024-12-14 | Resolved: 2024-12-15

## 2024-12-15 PROBLEM — R41.82 AMS (ALTERED MENTAL STATUS): Status: RESOLVED | Noted: 2024-12-14 | Resolved: 2024-12-15

## 2024-12-15 LAB
ANION GAP SERPL CALC-SCNC: 12 MMOL/L (ref 8–16)
BASOPHILS # BLD AUTO: 0.03 K/UL (ref 0–0.2)
BASOPHILS NFR BLD: 0.6 % (ref 0–1.9)
BUN SERPL-MCNC: 30 MG/DL (ref 8–23)
CALCIUM SERPL-MCNC: 9 MG/DL (ref 8.7–10.5)
CHLORIDE SERPL-SCNC: 104 MMOL/L (ref 95–110)
CO2 SERPL-SCNC: 23 MMOL/L (ref 23–29)
CREAT SERPL-MCNC: 1.6 MG/DL (ref 0.5–1.4)
DIFFERENTIAL METHOD BLD: ABNORMAL
EOSINOPHIL # BLD AUTO: 0.1 K/UL (ref 0–0.5)
EOSINOPHIL NFR BLD: 1 % (ref 0–8)
ERYTHROCYTE [DISTWIDTH] IN BLOOD BY AUTOMATED COUNT: 14.4 % (ref 11.5–14.5)
EST. GFR  (NO RACE VARIABLE): 34 ML/MIN/1.73 M^2
GLUCOSE SERPL-MCNC: 83 MG/DL (ref 70–110)
HCT VFR BLD AUTO: 28.3 % (ref 37–48.5)
HGB BLD-MCNC: 9.4 G/DL (ref 12–16)
IMM GRANULOCYTES # BLD AUTO: 0.02 K/UL (ref 0–0.04)
IMM GRANULOCYTES NFR BLD AUTO: 0.4 % (ref 0–0.5)
LYMPHOCYTES # BLD AUTO: 1.3 K/UL (ref 1–4.8)
LYMPHOCYTES NFR BLD: 26.1 % (ref 18–48)
MCH RBC QN AUTO: 27.7 PG (ref 27–31)
MCHC RBC AUTO-ENTMCNC: 33.2 G/DL (ref 32–36)
MCV RBC AUTO: 84 FL (ref 82–98)
MONOCYTES # BLD AUTO: 0.5 K/UL (ref 0.3–1)
MONOCYTES NFR BLD: 10.5 % (ref 4–15)
NEUTROPHILS # BLD AUTO: 3 K/UL (ref 1.8–7.7)
NEUTROPHILS NFR BLD: 61.4 % (ref 38–73)
NRBC BLD-RTO: 0 /100 WBC
OHS QRS DURATION: 94 MS
OHS QTC CALCULATION: 451 MS
PLATELET # BLD AUTO: 283 K/UL (ref 150–450)
PMV BLD AUTO: 11.7 FL (ref 9.2–12.9)
POCT GLUCOSE: 84 MG/DL (ref 70–110)
POTASSIUM SERPL-SCNC: 3.4 MMOL/L (ref 3.5–5.1)
RBC # BLD AUTO: 3.39 M/UL (ref 4–5.4)
SODIUM SERPL-SCNC: 139 MMOL/L (ref 136–145)
WBC # BLD AUTO: 4.94 K/UL (ref 3.9–12.7)

## 2024-12-15 PROCEDURE — 80048 BASIC METABOLIC PNL TOTAL CA: CPT | Performed by: HOSPITALIST

## 2024-12-15 PROCEDURE — 97530 THERAPEUTIC ACTIVITIES: CPT

## 2024-12-15 PROCEDURE — 25000003 PHARM REV CODE 250: Performed by: HOSPITALIST

## 2024-12-15 PROCEDURE — 63600175 PHARM REV CODE 636 W HCPCS: Performed by: EMERGENCY MEDICINE

## 2024-12-15 PROCEDURE — 97116 GAIT TRAINING THERAPY: CPT

## 2024-12-15 PROCEDURE — 85025 COMPLETE CBC W/AUTO DIFF WBC: CPT | Performed by: HOSPITALIST

## 2024-12-15 PROCEDURE — 36415 COLL VENOUS BLD VENIPUNCTURE: CPT | Performed by: HOSPITALIST

## 2024-12-15 RX ORDER — ENOXAPARIN SODIUM 100 MG/ML
40 INJECTION SUBCUTANEOUS EVERY 24 HOURS
Status: DISCONTINUED | OUTPATIENT
Start: 2024-12-15 | End: 2024-12-15 | Stop reason: HOSPADM

## 2024-12-15 RX ADMIN — ENOXAPARIN SODIUM 40 MG: 40 INJECTION SUBCUTANEOUS at 05:12

## 2024-12-15 RX ADMIN — AMLODIPINE BESYLATE 10 MG: 10 TABLET ORAL at 08:12

## 2024-12-15 RX ADMIN — ARIPIPRAZOLE 10 MG: 5 TABLET ORAL at 08:12

## 2024-12-15 RX ADMIN — METOPROLOL SUCCINATE 25 MG: 25 TABLET, EXTENDED RELEASE ORAL at 08:12

## 2024-12-15 RX ADMIN — BENZTROPINE MESYLATE 1 MG: 0.5 TABLET ORAL at 08:12

## 2024-12-15 RX ADMIN — PRAVASTATIN SODIUM 40 MG: 20 TABLET ORAL at 08:12

## 2024-12-15 RX ADMIN — POTASSIUM CHLORIDE 10 MEQ: 750 TABLET, EXTENDED RELEASE ORAL at 08:12

## 2024-12-15 NOTE — ASSESSMENT & PLAN NOTE
Patient presented with acute onset mutism and inability to ambulate x1 day duration.  Patient does have history of schizophrenia but reported to interact, speak, and ambulate at baseline.  Initial workup in the ED revealed patient to be afebrile without leukocytosis, hemodynamically stable, urine toxicology and UA negative, chest x-ray and CT head negative for acute findings.  Patient did present with JARAD of creatinine/GFR measuring 2.5/20, BUN 36.  Patient awake, alert, following commands, however nonverbal during assessment.  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  MRI and echo pending.  Plan:  -telemetry  -continue home medications  -f/u MRI and echo  -continued treatment of JARAD noted below  -consider Neurology and/or Psych consult for further evaluation    Looks much better, improving, talking, eating drinking well.  All w/u normal so far  AMS likely sec to Dehydration/JARAD- improving with IVF.

## 2024-12-15 NOTE — PT/OT/SLP PROGRESS
"Physical Therapy Treatment    Patient Name:  Carisa Curry   MRN:  [id number]    Recommendations:     Discharge Recommendations: Moderate Intensity Therapy  Discharge Equipment Recommendations: to be determined by next level of care  Barriers to discharge: None    Assessment:     Carisa Curry is a 69 y.o. female admitted with a medical diagnosis of AMS (altered mental status).  She presents with the following impairments/functional limitations: weakness, impaired endurance, impaired functional mobility, gait instability, impaired balance, decreased safety awareness, decreased lower extremity function, decreased coordination.    Rehab Prognosis: Fair; patient would benefit from acute skilled PT services to address these deficits and reach maximum level of function.    Recent Surgery: * No surgery found *     Plan:     During this hospitalization, patient to be seen 3 x/week to address the identified rehab impairments via gait training, therapeutic activities, therapeutic exercises and progress toward the following goals:    Plan of Care Expires:  12/28/24    Subjective     Chief Complaint: NONE, AGREEABLE TO TX. WITH ENCOURAGEMENT  Patient/Family Comments/goals: "I NEED TO USE THE BATHROOM"  Pain/Comfort:  Pain Rating 1: 0/10      Objective:     Communicated with NURSE ESPITIA prior to session.  Patient found supine with telemetry, peripheral IV, bed alarm upon PT entry to room.     General Precautions: Standard, fall  Orthopedic Precautions: N/A  Braces: N/A  Respiratory Status: Room air     Functional Mobility:  Bed Mobility:     Rolling Left:  stand by assistance  Scooting: stand by assistance  Supine to Sit: stand by assistance  Transfers:     Sit to Stand:  minimum assistance and of 2 persons with rolling walker  Bed to Chair: minimum assistance with  rolling walker  using  Step Transfer  Toilet Transfer: moderate assistance with  rolling walker  using  Step Transfer-INCREASED ASSIST FROM LOWER SURFACE  PT " "TOLERATED APPROX. 8 MINUTES STATIC  BATHROOM WITH RAF, CLEANING HERSELF FRONT/BACK WITH SET UP, REQUIRED MAXA FOR CHANGING SOILED BRIEF  Gait: PT AMB 8' X 2 TO AND FROM BATHROOM WITH RW AND RAF, QUICK UNSTEADY PACE ON 1ST TRIAL DUE TO URINARY URGENCY, CUES FOR UPRIGHT POSTURE AND RW SAFETY  Balance: FAIR SITTING BALANCE, POOR+/POOR DYNAMIC BALANCE DURING GAIT  PT EDUCATED IN AND PERFORMED BLE THEREX 10 REPS AROM WHILE SEATED IN CHAIR: HIP FLEX/EXT, LAQ, AP'S-LIMITED ACTIVE EXT. AT B KNEE'S    AM-PAC 6 CLICK MOBILITY  Turning over in bed (including adjusting bedclothes, sheets and blankets)?: 4  Sitting down on and standing up from a chair with arms (e.g., wheelchair, bedside commode, etc.): 3  Moving from lying on back to sitting on the side of the bed?: 4  Moving to and from a bed to a chair (including a wheelchair)?: 3  Need to walk in hospital room?: 3  Climbing 3-5 steps with a railing?: 1  Basic Mobility Total Score: 18     Treatment & Education:  PT EDUCATION:  - ROLE OF P.T. AND POC IN ACUTE CARE HOSPITAL SETTING  - RW USE AND SAFETY DURING TF'S AND GAIT  - ENCOURAGED TO INCREASE TIME OOB IN CHAIR TO TOLERANCE   - TO CONTINUE THERAPUETIC EXERCISES THROUGHOUT THE DAY TO INCREASE ACTIVITY TOLERANCE AND DECREASE RISK FOR PNEUMONIA AND BLOOD CLOTS: HIP FLEX/EXT, HIP ABD/ADD, QUAD SET, HEEL SLIDE, AP  - RISK FOR FALLS DUE TO GENERALIZED WEAKNESS, EDUCATED ON "CALL DON'T FALL", ENCOURAGED TO CALL FOR ASSISTANCE WITH ALL NEEDS SUCH AS BED<>CHAIR TRANSFERS OR TRIPS TO BATHROOM, PT AGREEABLE TO SAFETY PRECAUTIONS    Patient left up in chair with all lines intact, call button in reach, chair alarm on, and NURSE notified..    GOALS:   Multidisciplinary Problems       Physical Therapy Goals          Problem: Physical Therapy    Goal Priority Disciplines Outcome Interventions   Physical Therapy Goal     PT, PT/OT Progressing    Description: LTG'S TO BE MET IN 14 DAYS (12-28-24)  PT WILL REQUIRE CGA FOR BED " MOBILITY  PT WILL REQUIRE RAF FOR BED<>CHAIR TF'S  PT WILL  FEET WITH RW AND RAF  PT WILL INC AMPAC SCORE BY 2 POINTS TO PROGRESS GROSS FUNC MOBILITY                         Time Tracking:     PT Received On: 12/15/24  PT Start Time: 0740     PT Stop Time: 0810  PT Total Time (min): 30 min     Billable Minutes: Gait Training 10 and Therapeutic Activity 20    Treatment Type: Treatment  PT/PTA: PT     Number of PTA visits since last PT visit: 0     12/15/2024

## 2024-12-15 NOTE — PROGRESS NOTES
[facility] - Telemetry ([facility])  [facility] Medicine  Progress Note    Patient Name: Carisa Curry  MRN: [id number]  Patient Class: IP- Inpatient   Admission Date: 12/13/2024  Length of Stay: 1 days  Attending Physician: Racquel Munson MD  Primary Care Provider: Guadalupe Guzman DO        Subjective     Principal Problem:AMS (altered mental status)        HPI:  Carisa Curry is a 69 y.o. female with a PMH  has a past medical history of Arthritis, CHF (congestive heart failure), Diabetes mellitus, type 2, Diabetic retinopathy, Hyperlipidemia, Hypertension, Schizophrenia, and Seizures. who presented to the ED for further evaluation of altered mental status/change in baseline after patient reported not being able to walk or talk x1 day duration.  History was obtained through chart review, ED sign-out, and family as patient nonverbal at time of bedside assessment.  Daughter reported patient often spits things up out of her mouth and has not been walking or talking like she usually does raising concern and prompting them to bring patient to the ED for further evaluation.  Patient does have history of schizophrenia but is still interactive.  No known alleviating or aggravating factors were noted with all other review of systems negative except as noted above.  Prior to onset of symptoms, patient was in her usual state of health with no other concerns or complaints.  Initial workup in the ED revealed patient to be afebrile without leukocytosis, hemodynamically stable, creatinine/GFR measuring 2.5/20, troponin, lactic acid, TSH, urine toxicology, UA, chest x-ray, CT head, and MRI brain negative.  Patient able to follow commands and track but remains nonverbal.  Patient admitted to Hospital Medicine inpatient for continued medical management.    PCP: Guadalupe Guzman       Overview/Hospital Course:  69 y.o. AAF, with Hx Arthritis, CHF, DM 2 w retinopathy, HTN, HLP, Schizophrenia, and Seizures who presented to the ED w  AMS/change in baseline after patient reported not being able to walk or talk x1 day duration. Pt is nonverbal. Daughter reported patient often spits things up out of her mouth and has not been walking or talking like she usually does raising concern and prompting them to bring patient to the ED. Patient does have history of schizophrenia but is still interactive. Initial workup in the ED revealed patient to be afebrile without leukocytosis, hemodynamically stable, creatinine/GFR measuring 2.5/20, troponin, lactic acid, TSH, urine toxicology, UA, chest x-ray, CT head, and MRI brain negative. Patient able to follow commands and track but remains nonverbal. Patient admitted to Hospital Medicine inpatient for continued medical management.     12/14- Pt seen and examined, Looks much better, improving, talking, eating drinking well. All w/u normal so far. Her BP while sitting was 67/32 but pt was asymptomatic. She was immediately placed back in bed and her repeat BP was 124/57, comfortable on RA, AAOx3 now, sats 100% on RA. Labs improving, H/h 10/30, bun/cr down to 31/1.8, continue IVF and current tx.     Interval History: Looks much better, improving, talking, eating drinking well. All w/u normal so far. Her BP while sitting was 67/32 but pt was asymptomatic. She was immediately placed back in bed and her repeat BP was 124/57, comfortable on RA, AAOx3 now, sats 100% on RA. Labs improving, H/h 10/30, bun/cr down to 31/1.8, continue IVF and current tx.     Review of Systems   Unable to perform ROS: Patient nonverbal   Constitutional:  Positive for activity change, appetite change and fatigue.   All other systems reviewed and are negative.    Objective:     Vital Signs (Most Recent):  Temp: 97.7 °F (36.5 °C) (12/14/24 1619)  Pulse: 64 (12/14/24 1700)  Resp: 20 (12/14/24 1619)  BP: (!) 128/59 (12/14/24 1619)  SpO2: 97 % (12/14/24 1619) Vital Signs (24h Range):  Temp:  [97.7 °F (36.5 °C)-98.2 °F (36.8 °C)] 97.7 °F (36.5  °C)  Pulse:  [54-78] 64  Resp:  [16-20] 20  SpO2:  [97 %-100 %] 97 %  BP: ()/(32-70) 128/59     Weight: 80.7 kg (178 lb)  Body mass index is 32.56 kg/m².    Intake/Output Summary (Last 24 hours) at 12/14/2024 1758  Last data filed at 12/14/2024 0632  Gross per 24 hour   Intake 250 ml   Output --   Net 250 ml         Physical Exam  Vitals reviewed.   Constitutional:       General: She is not in acute distress.     Appearance: Normal appearance. She is obese. She is ill-appearing. She is not toxic-appearing or diaphoretic.   HENT:      Head: Normocephalic and atraumatic.      Right Ear: External ear normal.      Left Ear: External ear normal.      Nose: Nose normal. No congestion or rhinorrhea.      Mouth/Throat:      Mouth: Mucous membranes are moist.      Pharynx: Oropharynx is clear. No oropharyngeal exudate or posterior oropharyngeal erythema.   Eyes:      General: No scleral icterus.     Extraocular Movements: Extraocular movements intact.      Conjunctiva/sclera: Conjunctivae normal.      Pupils: Pupils are equal, round, and reactive to light.   Neck:      Vascular: No carotid bruit.   Cardiovascular:      Rate and Rhythm: Normal rate and regular rhythm.      Pulses: Normal pulses.      Heart sounds: Normal heart sounds. No murmur heard.     No friction rub. No gallop.   Pulmonary:      Effort: Pulmonary effort is normal. No respiratory distress.      Breath sounds: Normal breath sounds. No stridor. No wheezing, rhonchi or rales.   Chest:      Chest wall: No tenderness.   Abdominal:      General: Abdomen is flat. Bowel sounds are normal. There is no distension.      Palpations: Abdomen is soft. There is no mass.      Tenderness: There is no abdominal tenderness. There is no right CVA tenderness, left CVA tenderness, guarding or rebound.      Hernia: No hernia is present.   Musculoskeletal:         General: No swelling, tenderness, deformity or signs of injury. Normal range of motion.      Cervical back:  "Normal range of motion and neck supple. No rigidity or tenderness.      Right lower leg: No edema.      Left lower leg: No edema.   Lymphadenopathy:      Cervical: No cervical adenopathy.   Skin:     General: Skin is warm and dry.      Capillary Refill: Capillary refill takes less than 2 seconds.      Coloration: Skin is not jaundiced or pale.      Findings: No bruising, erythema, lesion or rash.   Neurological:      Mental Status: She is alert.      Cranial Nerves: No cranial nerve deficit.      Sensory: No sensory deficit.      Motor: Weakness present.      Coordination: Coordination normal.      Comments: Patient awake and alert, able to follow commands but is nonverbal.  4/5 strength throughout bilateral upper extremities.  3/5 strength throughout bilateral lower extremities.  Sensation to light touch grossly intact throughout.   Psychiatric:      Comments: Patient nonverbal             Significant Labs: All pertinent labs within the past 24 hours have been reviewed.  Blood Culture: No results for input(s): "LABBLOO" in the last 48 hours.  BMP:   Recent Labs   Lab 12/14/24  0551   GLU 89      K 3.8      CO2 21*   BUN 31*   CREATININE 1.8*   CALCIUM 9.0     CBC:   Recent Labs   Lab 12/13/24  1538 12/14/24  0551   WBC 7.31 5.22   HGB 10.4* 10.0*   HCT 31.4* 30.5*    314     CMP:   Recent Labs   Lab 12/13/24  1538 12/14/24  0551   * 138   K 4.2 3.8   CL 98 104   CO2 21* 21*   GLU 99 89   BUN 36* 31*   CREATININE 2.5* 1.8*   CALCIUM 9.6 9.0   PROT 8.4 7.5   ALBUMIN 3.7 3.2*   BILITOT 0.4 0.5   ALKPHOS 105 95   AST 25 21   ALT 9* 9*   ANIONGAP 15 13     Lactic Acid:   Recent Labs   Lab 12/13/24  1538   LACTATE 1.9     Significant Imaging: I have reviewed all pertinent imaging results/findings within the past 24 hours.    Assessment and Plan     * AMS (altered mental status)  Patient presented with acute onset mutism and inability to ambulate x1 day duration.  Patient does have history of " "schizophrenia but reported to interact, speak, and ambulate at baseline.  Initial workup in the ED revealed patient to be afebrile without leukocytosis, hemodynamically stable, urine toxicology and UA negative, chest x-ray and CT head negative for acute findings.  Patient did present with JARAD of creatinine/GFR measuring 2.5/20, BUN 36.  Patient awake, alert, following commands, however nonverbal during assessment.  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  MRI and echo pending.  Plan:  -telemetry  -continue home medications  -f/u MRI and echo  -continued treatment of JARAD noted below  -consider Neurology and/or Psych consult for further evaluation    Looks much better, improving, talking, eating drinking well.  All w/u normal so far  AMS likely sec to Dehydration/JARAD- improving with IVF.    Acute kidney injury superimposed on chronic kidney disease  Baseline creatinine is  1.3 . Most recent creatinine and eGFR are listed below.  Recent Labs     12/13/24  1538 12/14/24  0551   CREATININE 2.5* 1.8*   EGFRNORACEVR 20* 30*     Plan  -JARAD is  currently undergoing medical management  -Avoid nephrotoxins and renally dose meds for GFR listed above  -Monitor urine output, serial BMP, and adjust therapy as needed    Improving with IVF, continue    CHF (congestive heart failure)  Patient has  reported history of  heart failure (unknown type) that is Chronic. On presentation their CHF was well compensated. Most recent BNP and echo results are listed below.  No results for input(s): "BNP" in the last 72 hours.  Latest ECHO  No results found for this or any previous visit.    Current Heart Failure Medications  metoprolol succinate (TOPROL-XL) 24 hr tablet 25 mg, Daily, Oral  hydrALAZINE injection 10 mg, Every 6 hours PRN, Intravenous    Plan  -Monitor strict I&Os and daily weights.    -Place on telemetry  -Low sodium diet  -Place on fluid restriction of 1.5 L. "   -Cardiology has not been consulted  -The patient's volume status is at their baseline  -Continue home medications  -f/u echo    Normal echo,   Cont IVF as she is dehydrated    Controlled type 2 diabetes mellitus with microalbuminuria, without long-term current use of insulin  Patient's FSGs are controlled on current medication regimen.  Last A1c reviewed-   Lab Results   Component Value Date    HGBA1C 5.2 06/21/2024     Most recent fingerstick glucose reviewed-   Recent Labs   Lab 12/14/24  0507 12/14/24  0536 12/14/24  1139 12/14/24  1628   POCTGLUCOSE 63* 85 123* 72       Current correctional scale  Low  Titrate as needed  anti-hyperglycemic dose as follows-   Antihyperglycemics (From admission, onward)      Start     Stop Route Frequency Ordered    12/13/24 2229  insulin aspart U-100 pen 0-5 Units         -- SubQ Before meals & nightly PRN 12/13/24 2130        Plan:  -SSI  -A1c  -Accu-checks  -Hold oral hypoglycemics while patient is in the hospital  -Continue home long-acting insulin at 20% decrease, titrate up as needed  -Hypoglycemic protocol        Under control    Class 1 obesity due to excess calories with serious comorbidity and body mass index (BMI) of 32.0 to 32.9 in adult  Body mass index is 32.02 kg/m². Morbid obesity complicates all aspects of disease management from diagnostic modalities to treatment. Weight loss encouraged and health benefits explained to patient.       Schizophrenia  Chronic. Not in acute exacerbation and is without evidence of suicidal/homicidal ideations.  Patient currently admitted for AMS workup presenting with mutism and inability to walk.    Plan:  -Continue home medications   -Continue CVA workup as noted above  -Consider Neurology and/or Psychiatric consult for further evaluation    stable      Hyperlipidemia LDL goal <70  Patient is chronically on statin.will continue for now. Last Lipid Panel:   Lab Results   Component Value Date    CHOL 136 06/21/2024    HDL 56  06/21/2024    LDLCALC 68.0 06/21/2024    TRIG 60 06/21/2024    CHOLHDL 41.2 06/21/2024   Plan:  -Continue home medication  -low fat/low calorie diet      Hypertension goal BP (blood pressure) < 130/80  Chronic, controlled.  Latest blood pressure and vitals reviewed-   Temp:  [96.7 °F (35.9 °C)-98.1 °F (36.7 °C)]   Pulse:  [68-78]   Resp:  [16-20]   BP: (137-147)/(61-86)   SpO2:  [99 %-100 %] .   Home meds for hypertension were reviewed and noted below.   Hypertension Medications               amLODIPine (NORVASC) 10 MG tablet Take 1 tablet (10 mg total) by mouth once daily.    hydroCHLOROthiazide (HYDRODIURIL) 25 MG tablet Take 1 tablet (25 mg total) by mouth once daily.    losartan (COZAAR) 100 MG tablet Take 1 tablet (100 mg total) by mouth once daily.    metoprolol succinate (TOPROL-XL) 25 MG 24 hr tablet Take 1 tablet (25 mg total) by mouth once daily.     While in the hospital, will manage blood pressure as follows; Adjust home antihypertensive regimen as follows- holding losartan and hydrochlorothiazide in setting of JARAD.  Resuming amlodipine and metoprolol with p.r.n. IV hydralazine.    Will utilize p.r.n. blood pressure medication only if patient's blood pressure greater than  180/110 and she develops symptoms such as worsening chest pain or shortness of breath.        VTE Risk Mitigation (From admission, onward)           Ordered     enoxaparin injection 30 mg  Daily         12/13/24 2130     IP VTE HIGH RISK PATIENT  Once         12/13/24 2130     Place sequential compression device  Until discontinued         12/13/24 2130                    Discharge Planning   SELVIN:      Code Status: Full Code   Medical Readiness for Discharge Date:            Racquel Munson MD  Department of Hospital Medicine   [facility] - Telemetry ([facility])

## 2024-12-15 NOTE — ASSESSMENT & PLAN NOTE
"Patient has  reported history of  heart failure (unknown type) that is Chronic. On presentation their CHF was well compensated. Most recent BNP and echo results are listed below.  No results for input(s): "BNP" in the last 72 hours.  Latest ECHO  No results found for this or any previous visit.    Current Heart Failure Medications  metoprolol succinate (TOPROL-XL) 24 hr tablet 25 mg, Daily, Oral  hydrALAZINE injection 10 mg, Every 6 hours PRN, Intravenous    Plan  -Monitor strict I&Os and daily weights.    -Place on telemetry  -Low sodium diet  -Place on fluid restriction of 1.5 L.   -Cardiology has not been consulted  -The patient's volume status is at their baseline  -Continue home medications  -f/u echo    Normal echo,   Cont IVF as she is dehydrated  "

## 2024-12-15 NOTE — ASSESSMENT & PLAN NOTE
Patient's FSGs are controlled on current medication regimen.  Last A1c reviewed-   Lab Results   Component Value Date    HGBA1C 5.2 06/21/2024     Most recent fingerstick glucose reviewed-   Recent Labs   Lab 12/14/24  0507 12/14/24  0536 12/14/24  1139 12/14/24  1628   POCTGLUCOSE 63* 85 123* 72       Current correctional scale  Low  Titrate as needed  anti-hyperglycemic dose as follows-   Antihyperglycemics (From admission, onward)      Start     Stop Route Frequency Ordered    12/13/24 2229  insulin aspart U-100 pen 0-5 Units         -- SubQ Before meals & nightly PRN 12/13/24 2130        Plan:  -SSI  -A1c  -Accu-checks  -Hold oral hypoglycemics while patient is in the hospital  -Continue home long-acting insulin at 20% decrease, titrate up as needed  -Hypoglycemic protocol        Under control

## 2024-12-15 NOTE — PLAN OF CARE
A248/A248 CHARLES  Carisa Curry is a 69 y.o.female admitted on 12/13/2024 for AMS (altered mental status)   Code Status: Full Code MRN: [id number]   Review of patient's allergies indicates:  No Known Allergies  Past Medical History:   Diagnosis Date    Arthritis     CHF (congestive heart failure)     Diabetes mellitus, type 2     Diabetic retinopathy     Hyperlipidemia     Hypertension     Schizophrenia     Seizures     reported per pt and sister, nothing since childhood      PRN meds    acetaminophen, 650 mg, Q6H PRN  acetaminophen, 650 mg, Q8H PRN  albuterol-ipratropium, 3 mL, Q6H PRN  aluminum-magnesium hydroxide-simethicone, 30 mL, QID PRN  dextrose 10%, 12.5 g, PRN  dextrose 10%, 25 g, PRN  glucagon (human recombinant), 1 mg, PRN  glucose, 16 g, PRN  glucose, 24 g, PRN  hydrALAZINE, 10 mg, Q6H PRN  insulin aspart U-100, 0-5 Units, QID (AC + HS) PRN  melatonin, 6 mg, Nightly PRN  naloxone, 0.02 mg, PRN  ondansetron, 4 mg, Q8H PRN  promethazine, 25 mg, Q6H PRN  senna-docusate 8.6-50 mg, 1 tablet, BID PRN  sodium chloride 0.9%, 10 mL, Q12H PRN      Chart check completed. Will continue plan of care.      Orientation: oriented x 4  [place] Coma Scale Score: 15     Lead Monitored: Lead II Rhythm: normal sinus rhythm    Cardiac/Telemetry Box Number: [id number]  VTE Core Measure: (SCDs) Sequential compression device initiated/maintained Last Bowel Movement: 12/12/24  Diet diabetic Cardiac (Low Na/Chol); 2000 Calories (up to 75 gm per meal)  Diet Cardiac  Diet diabetic  Voiding Characteristics: external catheter  Lane Score: 18  Fall Risk Score: 16  Accucheck []   Freq?      Lines/Drains/Airways       Peripheral Intravenous Line  Duration                  Peripheral IV - Single Lumen 12/13/24 1537 20 G Right Antecubital 1 day

## 2024-12-15 NOTE — PLAN OF CARE
[facility] - Tele[facility] (Hospital)  Initial Discharge Assessment       Primary Care Provider: Guadalupe Guzman DO    Admission Diagnosis: Altered mental status [R41.82]  Acute renal failure, unspecified acute renal failure type [N17.9]    Admission Date: 12/13/2024  Expected Discharge Date:     Transition of Care Barriers: None    Payor: HEALTHY BLUE MEDICARE ADVANTAGE / Plan: HEALTHY BLUE DUAL ADVANTAGE / Product Type: Medicare Advantage /     Extended Emergency Contact Information  Primary Emergency Contact: Viv Arita  Mobile Phone: [phone]  Relation: Sister  Secondary Emergency Contact: ParkMery  Address: [address] [place] [address] [address] [place] [address] [facility]  Home Phone: [phone]  Mobile Phone: [phone]  Relation: Sister    Discharge Plan A: Home  Discharge Plan B: Home with family      [id number]  [place] - [address] [address] [facility] [address]  Phone: [phone] Fax: [phone]      Initial Assessment (most recent)       Adult Discharge Assessment - 12/15/24 0920          Discharge Assessment    Assessment Type Discharge Planning Assessment     Confirmed/corrected address, phone number and insurance Yes     Confirmed Demographics Correct on Facesheet     Source of Information patient     When was your last doctors appointment? --   November 2024    Does patient/caregiver understand observation status Yes     Reason For Admission Per pt low Glucose     People in Home sibling(s)     Do you expect to return to your current living situation? Yes     Do you have help at home or someone to help you manage your care at home? Yes     Who are your caregiver(s) and their phone number(s)? Sarina Curry, sister     Prior to hospitilization cognitive status: Unable to Assess     Current cognitive status: Alert/Oriented     Walking or Climbing Stairs Difficulty no     Dressing/Bathing Difficulty no     Home Layout Able to live on 1st floor      Equipment Currently Used at Home walker, standard     Readmission within 30 days? No     Patient currently being followed by outpatient case management? No     Do you currently have service(s) that help you manage your care at home? No     Do you take prescription medications? Yes     Do you have prescription coverage? Yes     Coverage [employer] Medicare     Do you have any problems affording any of your prescribed medications? No     Is the patient taking medications as prescribed? yes     Who is going to help you get home at discharge? Mery Nick, sister [phone]     How do you get to doctors appointments? family or friend will provide     Are you on dialysis? No     Do you take coumadin? No     Discharge Plan A Home     Discharge Plan B Home with family     DME Needed Upon Discharge  none     Discharge Plan discussed with: Patient     Transition of Care Barriers None        OTHER    Name(s) of People in Home sister Citlali Gomes met with pt at the bedside to complete d/c assessment. Pt awake and oriented to complete assessment. No needs reported. Sister available at d/c. CK,MSW

## 2024-12-15 NOTE — PLAN OF CARE
[facility] (Hospital)  Discharge Final Note    Primary Care Provider: Guadalupe Guzman DO    Expected Discharge Date: 12/15/2024    Final Discharge Note (most recent)       Final Note - 12/15/24 1315          Final Note    Assessment Type Final Discharge Note     Anticipated Discharge Disposition Home or Self Care        Post-Acute Status    Discharge Delays None known at this time                   No needs at the time of chart review. Km,MSW    Important Message from Medicare             Contact Info       Guadalupe Guzman DO   Specialty: Internal Medicine   Relationship: PCP - [address] [place] [address]   Phone: [phone]       Next Steps: Schedule an appointment as soon as possible for a visit in 3 day(s)    Instructions: Hospital follow up

## 2024-12-15 NOTE — DISCHARGE SUMMARY
[facility] ([facility])  [facility] Medicine  Discharge Summary      Patient Name: Carisa Curry  MRN: [id number]  [facility]: [id number]  Patient Class: IP- Inpatient  Admission Date: 12/13/2024  Hospital Length of Stay: 2 days  Discharge Date and Time:  12/15/2024 5:04 PM  Attending Physician: Racquel Munson MD   Discharging Provider: Racquel Munson MD  Primary Care Provider: Guadalupe Guzman DO    Primary Care Team: Networked reference to record PCT     HPI:   Carisa Curry is a 69 y.o. female with a PMH  has a past medical history of Arthritis, CHF (congestive heart failure), Diabetes mellitus, type 2, Diabetic retinopathy, Hyperlipidemia, Hypertension, Schizophrenia, and Seizures. who presented to the ED for further evaluation of altered mental status/change in baseline after patient reported not being able to walk or talk x1 day duration.  History was obtained through chart review, ED sign-out, and family as patient nonverbal at time of bedside assessment.  Daughter reported patient often spits things up out of her mouth and has not been walking or talking like she usually does raising concern and prompting them to bring patient to the ED for further evaluation.  Patient does have history of schizophrenia but is still interactive.  No known alleviating or aggravating factors were noted with all other review of systems negative except as noted above.  Prior to onset of symptoms, patient was in her usual state of health with no other concerns or complaints.  Initial workup in the ED revealed patient to be afebrile without leukocytosis, hemodynamically stable, creatinine/GFR measuring 2.5/20, troponin, lactic acid, TSH, urine toxicology, UA, chest x-ray, CT head, and MRI brain negative.  Patient able to follow commands and track but remains nonverbal.  Patient admitted to Hospital Medicine inpatient for continued medical management.    PCP: Guadalupe Guzman       * No surgery found *      Hospital Course:   69 y.o.  AAF, with Hx Arthritis, CHF, DM 2 w retinopathy, HTN, HLP, Schizophrenia, and Seizures who presented to the ED w AMS/change in baseline after patient reported not being able to walk or talk x1 day duration. Pt is nonverbal. Daughter reported patient often spits things up out of her mouth and has not been walking or talking like she usually does raising concern and prompting them to bring patient to the ED. Patient does have history of schizophrenia but is still interactive. Initial workup in the ED revealed patient to be afebrile without leukocytosis, hemodynamically stable, creatinine/GFR measuring 2.5/20, troponin, lactic acid, TSH, urine toxicology, UA, chest x-ray, CT head, and MRI brain negative. Patient able to follow commands and track but remains nonverbal. Patient admitted to Hospital Medicine inpatient for continued medical management.     12/14- Pt seen and examined, Looks much better, improving, talking, eating drinking well. All w/u normal so far. Her BP while sitting was 67/32 but pt was asymptomatic. She was immediately placed back in bed and her repeat BP was 124/57, comfortable on RA, AAOx3 now, sats 100% on RA. Labs improving, H/h 10/30, bun/cr down to 31/1.8, continue IVF and current tx.     12/15- looks and feels much better, awake, alert and talking, oriented, eating drinking well, walking around well, ready to go home now. Was getting IVF and Bun/Cr improved to 30/1.6. will hold her Losartan and HCTZ and KCl until seen by her PCP in a week with repeat renal function panel. She was seen and examined and deemed stable for discharge home with HH today.      Goals of Care Treatment Preferences:  Code Status: Full Code      SDOH Screening:  The patient was screened for food insecurity, housing instability, transportation needs, utility difficulties, and interpersonal safety. The social determinant(s) of health identified as a concern this admission are:  Housing instability    The plan to address  "these concerns is:     Social Drivers of Health with Concerns     Housing Stability: High Risk (12/13/2024)   Transportation Needs: Patient Declined (12/13/2024)        Consults:     CHF (congestive heart failure)  Patient has  reported history of  heart failure (unknown type) that is Chronic. On presentation their CHF was well compensated. Most recent BNP and echo results are listed below.  No results for input(s): "BNP" in the last 72 hours.  Latest ECHO  No results found for this or any previous visit.    Current Heart Failure Medications  metoprolol succinate (TOPROL-XL) 24 hr tablet 25 mg, Daily, Oral  hydrALAZINE injection 10 mg, Every 6 hours PRN, Intravenous    Plan  -Monitor strict I&Os and daily weights.    -Place on telemetry  -Low sodium diet  -Place on fluid restriction of 1.5 L.   -Cardiology has not been consulted  -The patient's volume status is at their baseline  -Continue home medications  -f/u echo    Normal echo,   Cont IVF as she is dehydrated    Controlled type 2 diabetes mellitus with microalbuminuria, without long-term current use of insulin  Patient's FSGs are controlled on current medication regimen.  Last A1c reviewed-   Lab Results   Component Value Date    HGBA1C 5.2 06/21/2024     Most recent fingerstick glucose reviewed-   Recent Labs   Lab 12/14/24  0507 12/14/24  0536 12/14/24  1139 12/14/24  1628   POCTGLUCOSE 63* 85 123* 72       Current correctional scale  Low  Titrate as needed  anti-hyperglycemic dose as follows-   Antihyperglycemics (From admission, onward)      Start     Stop Route Frequency Ordered    12/13/24 2229  insulin aspart U-100 pen 0-5 Units         -- SubQ Before meals & nightly PRN 12/13/24 2130        Plan:  -SSI  -A1c  -Accu-checks  -Hold oral hypoglycemics while patient is in the hospital  -Continue home long-acting insulin at 20% decrease, titrate up as needed  -Hypoglycemic protocol        Under control    Class 1 obesity due to excess calories with serious " comorbidity and body mass index (BMI) of 32.0 to 32.9 in adult  Body mass index is 32.02 kg/m². Morbid obesity complicates all aspects of disease management from diagnostic modalities to treatment. Weight loss encouraged and health benefits explained to patient.       Schizophrenia  Chronic. Not in acute exacerbation and is without evidence of suicidal/homicidal ideations.  Patient currently admitted for AMS workup presenting with mutism and inability to walk.    Plan:  -Continue home medications   -Continue CVA workup as noted above  -Consider Neurology and/or Psychiatric consult for further evaluation    stable      Hyperlipidemia LDL goal <70  Patient is chronically on statin.will continue for now. Last Lipid Panel:   Lab Results   Component Value Date    CHOL 136 06/21/2024    HDL 56 06/21/2024    LDLCALC 68.0 06/21/2024    TRIG 60 06/21/2024    CHOLHDL 41.2 06/21/2024   Plan:  -Continue home medication  -low fat/low calorie diet      Hypertension goal BP (blood pressure) < 130/80  Chronic, controlled.  Latest blood pressure and vitals reviewed-   Temp:  [96.7 °F (35.9 °C)-98.1 °F (36.7 °C)]   Pulse:  [68-78]   Resp:  [16-20]   BP: (137-147)/(61-86)   SpO2:  [99 %-100 %] .   Home meds for hypertension were reviewed and noted below.   Hypertension Medications               amLODIPine (NORVASC) 10 MG tablet Take 1 tablet (10 mg total) by mouth once daily.    hydroCHLOROthiazide (HYDRODIURIL) 25 MG tablet Take 1 tablet (25 mg total) by mouth once daily.    losartan (COZAAR) 100 MG tablet Take 1 tablet (100 mg total) by mouth once daily.    metoprolol succinate (TOPROL-XL) 25 MG 24 hr tablet Take 1 tablet (25 mg total) by mouth once daily.     While in the hospital, will manage blood pressure as follows; Adjust home antihypertensive regimen as follows- holding losartan and hydrochlorothiazide in setting of JARAD.  Resuming amlodipine and metoprolol with p.r.n. IV hydralazine.    Will utilize p.r.n. blood pressure  medication only if patient's blood pressure greater than  180/110 and she develops symptoms such as worsening chest pain or shortness of breath.        Final Active Diagnoses:    Diagnosis Date Noted POA    CHF (congestive heart failure) [I50.9] 12/14/2024 Yes    Controlled type 2 diabetes mellitus with microalbuminuria, without long-term current use of insulin [E11.29, R80.9] 09/16/2015 Yes     Chronic    Class 1 obesity due to excess calories with serious comorbidity and body mass index (BMI) of 32.0 to 32.9 in adult [E66.811, E66.09, Z68.32] 12/14/2024 Not Applicable     Chronic    Schizophrenia [F20.9] 02/11/2019 Yes     Chronic    Hyperlipidemia LDL goal <70 [E78.5] 02/17/2017 Yes     Chronic    Hypertension goal BP (blood pressure) < 130/80 [I10] 07/01/2015 Yes     Chronic      Problems Resolved During this Admission:    Diagnosis Date Noted Date Resolved POA    PRINCIPAL PROBLEM:  AMS (altered mental status) [R41.82] 12/14/2024 12/15/2024 Yes    Acute kidney injury superimposed on chronic kidney disease [N17.9, N18.9] 12/14/2024 12/15/2024 Yes       Discharged Condition: stable    Disposition: Home or Self Care    Follow Up:   Follow-up Information       Guadalupe Guzman DO. Schedule an appointment as soon as possible for a visit in 3 day(s).    Specialty: Internal Medicine  Why: Hospital follow up  Contact information:  [address] [place] [address] [address] [place] [address] [phone]                           Patient Instructions:      Diet Cardiac     Diet diabetic     Activity as tolerated       Significant Diagnostic Studies: Labs: BMP:   Recent Labs   Lab 12/14/24  0551 12/15/24  0450   GLU 89 83    139   K 3.8 3.4*    104   CO2 21* 23   BUN 31* 30*   CREATININE 1.8* 1.6*   CALCIUM 9.0 9.0   , CMP   Recent Labs   Lab 12/14/24  0551 12/15/24  0450    139   K 3.8 3.4*    104   CO2 21* 23   GLU 89 83   BUN 31* 30*   CREATININE 1.8* 1.6*   CALCIUM 9.0 9.0   PROT 7.5  --    ALBUMIN 3.2*  --     BILITOT 0.5  --    ALKPHOS 95  --    AST 21  --    ALT 9*  --    ANIONGAP 13 12   , CBC   Recent Labs   Lab 12/14/24  0551 12/15/24  0450   WBC 5.22 4.94   HGB 10.0* 9.4*   HCT 30.5* 28.3*    283   , Troponin   Recent Labs   Lab 12/13/24  1538   TROPONINI 0.023   , A1C:   Recent Labs   Lab 06/21/24  1034   HGBA1C 5.2   , and All labs within the past 24 hours have been reviewed  Radiology:   Imaging Results              CT Head Without Contrast (Final result)  Result time 12/13/24 16:15:02      Final result by Cathy Olivares MD (12/13/24 16:15:02)                   Impression:      No acute finding identified      Electronically signed by: Cathy Olivares  Date:    12/13/2024  Time:    16:15               Narrative:    EXAMINATION:  CT HEAD WITHOUT CONTRAST    CLINICAL HISTORY:  Mental status change, unknown cause;    TECHNIQUE:  Low dose axial images were obtained through the head.  Coronal and sagittal reformations were also performed. Contrast was not administered.    COMPARISON:  None.    FINDINGS:  No displaced skull fracture.  Bilateral mastoid air cell effusion.  No acute intracranial hemorrhage or mass effect.  No hydrocephalus                                       X-Ray Chest AP Portable (Final result)  Result time 12/13/24 15:38:06      Final result by Carlos Millan MD (12/13/24 15:38:06)                   Impression:      No acute process seen.      Electronically signed by: Carlos Millan MD  Date:    12/13/2024  Time:    15:38               Narrative:    EXAMINATION:  XR CHEST AP PORTABLE    CLINICAL HISTORY:  ams;    FINDINGS:  Single view of the chest.  Comparison 06/02/2022    Cardiac silhouette is normal.  The lungs demonstrate no evidence of active disease.  No evidence of pleural effusion or pneumothorax.  Bones appear intact.  Moderate degenerative changes and moderate atherosclerotic disease.  Study is limited by poor inspiration                                     Cardiac  Graphics: Echocardiogram: Transthoracic echo (TTE) complete (Cupid Only):   Results for orders placed or performed during the hospital encounter of 12/13/24   Echo Saline Bubble? Yes   Result Value Ref Range    BSA 1.88 m2    LVOT stroke volume 123.2 cm3    LVIDd 4.0 3.5 - 6.0 cm    LV Systolic Volume 18.86 mL    LV Systolic Volume Index 10.4 mL/m2    LVIDs 2.3 2.1 - 4.0 cm    LV Diastolic Volume 70.51 mL    LV Diastolic Volume Index 38.74 mL/m2    Left Ventricular End Systolic Volume by Teichholz Method 18.86 mL    Left Ventricular End Diastolic Volume by Teichholz Method 70.51 mL    IVS 1.2 (A) 0.6 - 1.1 cm    LVOT diameter 2.1 cm    LVOT area 3.5 cm2    FS 42.5 28 - 44 %    Left Ventricle Relative Wall Thickness 0.60 cm    PW 1.2 (A) 0.6 - 1.1 cm    LV mass 165.5 g    LV Mass Index 90.9 g/m2    MV Peak E Yayo 0.66 m/s    TDI LATERAL 0.08 m/s    TDI SEPTAL 0.11 m/s    E/E' ratio 6.95 m/s    MV Peak A Yayo 0.79 m/s    TR Max Yayo 2.68 m/s    E/A ratio 0.84     IVRT 59.94 msec    E wave deceleration time 328.53 msec    LV SEPTAL E/E' RATIO 6.00 m/s    LV LATERAL E/E' RATIO 8.25 m/s    LVOT peak yayo 1.5 m/s    Left Ventricular Outflow Tract Mean Velocity 1.29 cm/s    Left Ventricular Outflow Tract Mean Gradient 6.91 mmHg    RVOT peak VTI 20.6 cm    TAPSE 2.02 cm    LA size 2.63 cm    Left Atrium Minor Axis 3.13 cm    Left Atrium Major Axis 4.25 cm    RA Major Axis 2.42 cm    AV mean gradient 11.5 mmHg    AV peak gradient 16.0 mmHg    Ao peak yayo 2.0 m/s    Ao VTI 45.4 cm    LVOT peak VTI 35.6 cm    AV valve area 2.7 cm²    AV Velocity Ratio 0.75     AV index (prosthetic) 0.78     JOE by Velocity Ratio 2.6 cm²    MV stenosis pressure 1/2 time 95.27 ms    MV valve area p 1/2 method 2.31 cm2    Triscuspid Valve Regurgitation Peak Gradient 29 mmHg    PV mean gradient 3 mmHg    RVOT peak yayo 1.35 m/s    Ao root annulus 2.90 cm    STJ 3.26 cm    Ascending aorta 3.35 cm    IVC diameter 0.88 cm    Mean e' 0.10 m/s    ZLVIDS  -2.39     ZLVIDD -2.31     GEORGINA 12.8 mL/m2    LA Vol 23.37 cm3    LA WIDTH 2.9 cm    RA Width 2.5 cm    TV resting pulmonary artery pressure 32 mmHg    RV TB RVSP 6 mmHg    Est. RA pres 3 mmHg    Narrative      Left Ventricle: The left ventricle is normal in size. Mildly increased   wall thickness. There is mild concentric hypertrophy. There is normal   systolic function with a visually estimated ejection fraction of 60 - 65%.   There is normal diastolic function.    Right Ventricle: Normal right ventricular cavity size. Wall thickness   is normal. Systolic function is normal.    Aortic Valve: There is mild aortic valve sclerosis. Mildly calcified   cusps. There is mild annular calcification present. Aortic valve peak   velocity is 2.0 m/s. Mean gradient is 11.5 mmHg.    Tricuspid Valve: There is mild regurgitation.    Pulmonary Artery: The estimated pulmonary artery systolic pressure is   32 mmHg.    IVC/SVC: Normal venous pressure at 3 mmHg.         Pending Diagnostic Studies:       None           Medications:  Reconciled Home Medications:      Medication List        CHANGE how you take these medications      alendronate 70 MG tablet  Commonly known as: FOSAMAX  Take 1 tablet (70 mg total) by mouth once a week.  What changed: additional instructions     benztropine 1 MG tablet  Commonly known as: COGENTIN  Take 1/2 to 1 tablet twice daily as needed for side effects.  What changed:   how much to take  how to take this  when to take this     blood sugar diagnostic Strp  Commonly known as: FREESTYLE LITE STRIPS  1 strip by Misc.(Non-Drug; Combo Route) route 2 (two) times a day. TEST BLOOD GLUCOSE 1 TIME DAILY  What changed: Another medication with the same name was removed. Continue taking this medication, and follow the directions you see here.     FREESTYLE LANCETS 28 gauge lancets  Generic drug: lancets  Apply topically once daily.  What changed: Another medication with the same name was removed. Continue taking  "this medication, and follow the directions you see here.     traZODone 100 MG tablet  Commonly known as: DESYREL  Take 1/2 to 1 tablet at bedtime as needed for sleep.  What changed:   how much to take  how to take this  when to take this  reasons to take this  additional instructions            CONTINUE taking these medications      amLODIPine 10 MG tablet  Commonly known as: NORVASC  Take 1 tablet (10 mg total) by mouth once daily.     ARIPiprazole 10 MG Tab  Commonly known as: ABILIFY  Take 1 tablet (10 mg total) by mouth once daily.     blood-glucose meter kit  To check BG 1 times daily, to use with insurance preferred meter     cholecalciferol (vitamin D3) 25 mcg (1,000 unit) capsule  Commonly known as: VITAMIN D3  Take 1 capsule (1,000 Units total) by mouth once daily.     diclofenac sodium 1 % Gel  Commonly known as: VOLTAREN  Apply 2 g topically 4 (four) times daily. for 10 days     metFORMIN 500 MG ER 24hr tablet  Commonly known as: GLUCOPHAGE-XR  Take 1 tablet (500 mg total) by mouth 2 (two) times daily with meals.     metoprolol succinate 25 MG 24 hr tablet  Commonly known as: TOPROL-XL  Take 1 tablet (25 mg total) by mouth once daily.     pen needle, diabetic 31 gauge x 5/16" Ndle  Commonly known as: PEN NEEDLE  Once a day insulin injection.     pravastatin 40 MG tablet  Commonly known as: PRAVACHOL  Take 1 tablet (40 mg total) by mouth once daily.            STOP taking these medications      hydroCHLOROthiazide 25 MG tablet  Commonly known as: HYDRODIURIL     losartan 100 MG tablet  Commonly known as: COZAAR     nystatin powder  Commonly known as: MYCOSTATIN     potassium chloride SA 10 MEQ tablet  Commonly known as: K-DUR,KLOR-PAYAM M              Indwelling Lines/Drains at time of discharge:   Lines/Drains/Airways       None                   Time spent on the discharge of patient: 41 minutes         Racquel Munson MD  Department of Hospital Medicine  [facility] - Tele[facility] ([facility])  "

## 2024-12-15 NOTE — PROGRESS NOTES
Pharmacist Renal Dose Adjustment Note    Carisa Curry is a 69 y.o. female being treated with the medication Lovenox    Patient Data:    Vital Signs (Most Recent):  Temp: 98.2 °F (36.8 °C) (12/15/24 0713)  Pulse: 87 (12/15/24 0832)  Resp: 18 (12/15/24 0713)  BP: (!) 142/65 (12/15/24 0713)  SpO2: 100 % (12/15/24 0713) Vital Signs (72h Range):  Temp:  [96.7 °F (35.9 °C)-98.8 °F (37.1 °C)]   Pulse:  [54-87]   Resp:  [16-20]   BP: ()/(32-86)   SpO2:  [96 %-100 %]      Recent Labs   Lab 12/13/24  1538 12/14/24  0551 12/15/24  0450   CREATININE 2.5* 1.8* 1.6*     Serum creatinine: 1.6 mg/dL (H) 12/15/24 0450  Estimated creatinine clearance: 32.2 mL/min (A)    Medication:Lovenox dose: 30 mg frequency every day will be changed to medication:Lovenox dose:40 mg frequency:every day    Pharmacist's Name: Ric Olguin  Pharmacist's Extension: [phone]

## 2024-12-15 NOTE — ASSESSMENT & PLAN NOTE
Chronic. Not in acute exacerbation and is without evidence of suicidal/homicidal ideations.  Patient currently admitted for AMS workup presenting with mutism and inability to walk.    Plan:  -Continue home medications   -Continue CVA workup as noted above  -Consider Neurology and/or Psychiatric consult for further evaluation    stable

## 2024-12-15 NOTE — ASSESSMENT & PLAN NOTE
Baseline creatinine is  1.3 . Most recent creatinine and eGFR are listed below.  Recent Labs     12/13/24  1538 12/14/24  0551   CREATININE 2.5* 1.8*   EGFRNORACEVR 20* 30*     Plan  -JARAD is  currently undergoing medical management  -Avoid nephrotoxins and renally dose meds for GFR listed above  -Monitor urine output, serial BMP, and adjust therapy as needed    Improving with IVF, continue

## 2024-12-15 NOTE — NURSING
AVS virtually reviewed with patient in its entirety with emphasis on diet, medications, follow-up appointments and reasons to return to the ED or contact the [facility] On Call Nurse Care Line. Education complete and patient voiced understanding. All questions answered. Discharge teaching complete.

## 2024-12-16 DIAGNOSIS — M81.0 AGE-RELATED OSTEOPOROSIS WITHOUT CURRENT PATHOLOGICAL FRACTURE: ICD-10-CM

## 2024-12-16 NOTE — TELEPHONE ENCOUNTER
No care due was identified.  [facility] Embedded Care Due Messages. Reference number: [id number].   12/16/2024 1:31:33 AM CST

## 2024-12-17 RX ORDER — ALENDRONATE SODIUM 70 MG/1
70 TABLET ORAL
Qty: 12 TABLET | Refills: 2 | Status: SHIPPED | OUTPATIENT
Start: 2024-12-17

## 2024-12-17 NOTE — TELEPHONE ENCOUNTER
Refill Routing Note   Medication(s) are not appropriate for processing by [facility] for the following reason(s):        Outside of protocol    [facility] action(s):  Route             Appointments  past 12m or future 3m with PCP    Date Provider   Last Visit   7/30/2024 Guadalupe Guzman DO   Next Visit   1/30/2025 Guadalupe Guzman DO   ED visits in past 90 days: 0        Note composed:7:52 PM 12/16/2024

## 2024-12-19 ENCOUNTER — LAB VISIT (OUTPATIENT)
Dept: LAB | Facility: HOSPITAL | Age: 69
End: 2024-12-19
Payer: COMMERCIAL

## 2024-12-19 ENCOUNTER — OFFICE VISIT (OUTPATIENT)
Dept: INTERNAL MEDICINE | Facility: CLINIC | Age: 69
End: 2024-12-19
Payer: COMMERCIAL

## 2024-12-19 VITALS
RESPIRATION RATE: 12 BRPM | BODY MASS INDEX: 31.56 KG/M2 | TEMPERATURE: 97 F | WEIGHT: 171.5 LBS | SYSTOLIC BLOOD PRESSURE: 128 MMHG | DIASTOLIC BLOOD PRESSURE: 64 MMHG | HEIGHT: 62 IN

## 2024-12-19 DIAGNOSIS — N17.9 ACUTE KIDNEY INJURY: ICD-10-CM

## 2024-12-19 DIAGNOSIS — I50.9 CONGESTIVE HEART FAILURE, UNSPECIFIED HF CHRONICITY, UNSPECIFIED HEART FAILURE TYPE: ICD-10-CM

## 2024-12-19 DIAGNOSIS — E11.29 CONTROLLED TYPE 2 DIABETES MELLITUS WITH MICROALBUMINURIA, WITHOUT LONG-TERM CURRENT USE OF INSULIN: Chronic | ICD-10-CM

## 2024-12-19 DIAGNOSIS — E78.5 HYPERLIPIDEMIA LDL GOAL <70: Chronic | ICD-10-CM

## 2024-12-19 DIAGNOSIS — R80.9 CONTROLLED TYPE 2 DIABETES MELLITUS WITH MICROALBUMINURIA, WITHOUT LONG-TERM CURRENT USE OF INSULIN: Chronic | ICD-10-CM

## 2024-12-19 DIAGNOSIS — D64.9 ANEMIA, UNSPECIFIED TYPE: ICD-10-CM

## 2024-12-19 DIAGNOSIS — I10 HYPERTENSION GOAL BP (BLOOD PRESSURE) < 130/80: Chronic | ICD-10-CM

## 2024-12-19 DIAGNOSIS — F20.9 SCHIZOPHRENIA, UNSPECIFIED TYPE: Chronic | ICD-10-CM

## 2024-12-19 DIAGNOSIS — N17.9 ACUTE KIDNEY INJURY: Primary | ICD-10-CM

## 2024-12-19 LAB
ALBUMIN SERPL BCP-MCNC: 3.1 G/DL (ref 3.5–5.2)
ALP SERPL-CCNC: 82 U/L (ref 40–150)
ALT SERPL W/O P-5'-P-CCNC: 5 U/L (ref 10–44)
ANION GAP SERPL CALC-SCNC: 10 MMOL/L (ref 8–16)
AST SERPL-CCNC: 16 U/L (ref 10–40)
BASOPHILS # BLD AUTO: 0.03 K/UL (ref 0–0.2)
BASOPHILS NFR BLD: 0.5 % (ref 0–1.9)
BILIRUB SERPL-MCNC: 0.4 MG/DL (ref 0.1–1)
BUN SERPL-MCNC: 11 MG/DL (ref 8–23)
CALCIUM SERPL-MCNC: 9.5 MG/DL (ref 8.7–10.5)
CHLORIDE SERPL-SCNC: 106 MMOL/L (ref 95–110)
CO2 SERPL-SCNC: 23 MMOL/L (ref 23–29)
CREAT SERPL-MCNC: 1.2 MG/DL (ref 0.5–1.4)
DIFFERENTIAL METHOD BLD: ABNORMAL
EOSINOPHIL # BLD AUTO: 0.1 K/UL (ref 0–0.5)
EOSINOPHIL NFR BLD: 1.3 % (ref 0–8)
ERYTHROCYTE [DISTWIDTH] IN BLOOD BY AUTOMATED COUNT: 14.4 % (ref 11.5–14.5)
EST. GFR  (NO RACE VARIABLE): 48.7 ML/MIN/1.73 M^2
ESTIMATED AVG GLUCOSE: 97 MG/DL (ref 68–131)
GLUCOSE SERPL-MCNC: 73 MG/DL (ref 70–110)
HBA1C MFR BLD: 5 % (ref 4–5.6)
HCT VFR BLD AUTO: 29.9 % (ref 37–48.5)
HGB BLD-MCNC: 9.5 G/DL (ref 12–16)
IMM GRANULOCYTES # BLD AUTO: 0.01 K/UL (ref 0–0.04)
IMM GRANULOCYTES NFR BLD AUTO: 0.2 % (ref 0–0.5)
LYMPHOCYTES # BLD AUTO: 1.7 K/UL (ref 1–4.8)
LYMPHOCYTES NFR BLD: 30.5 % (ref 18–48)
MCH RBC QN AUTO: 27.5 PG (ref 27–31)
MCHC RBC AUTO-ENTMCNC: 31.8 G/DL (ref 32–36)
MCV RBC AUTO: 87 FL (ref 82–98)
MONOCYTES # BLD AUTO: 0.6 K/UL (ref 0.3–1)
MONOCYTES NFR BLD: 10.5 % (ref 4–15)
NEUTROPHILS # BLD AUTO: 3.2 K/UL (ref 1.8–7.7)
NEUTROPHILS NFR BLD: 57 % (ref 38–73)
NRBC BLD-RTO: 0 /100 WBC
PLATELET # BLD AUTO: 310 K/UL (ref 150–450)
PMV BLD AUTO: 11.8 FL (ref 9.2–12.9)
POTASSIUM SERPL-SCNC: 3.7 MMOL/L (ref 3.5–5.1)
PROT SERPL-MCNC: 7.5 G/DL (ref 6–8.4)
RBC # BLD AUTO: 3.45 M/UL (ref 4–5.4)
SODIUM SERPL-SCNC: 139 MMOL/L (ref 136–145)
WBC # BLD AUTO: 5.54 K/UL (ref 3.9–12.7)

## 2024-12-19 PROCEDURE — 80053 COMPREHEN METABOLIC PANEL: CPT

## 2024-12-19 PROCEDURE — 36415 COLL VENOUS BLD VENIPUNCTURE: CPT

## 2024-12-19 PROCEDURE — 99999 PR PBB SHADOW E&M-EST. PATIENT-LVL IV: CPT | Mod: PBBFAC,,,

## 2024-12-19 PROCEDURE — 83036 HEMOGLOBIN GLYCOSYLATED A1C: CPT

## 2024-12-19 PROCEDURE — 85025 COMPLETE CBC W/AUTO DIFF WBC: CPT

## 2024-12-19 RX ORDER — LANCETS
EACH MISCELLANEOUS
Qty: 100 EACH | Refills: 3 | Status: SHIPPED | OUTPATIENT
Start: 2024-12-19

## 2024-12-19 RX ORDER — INSULIN PUMP SYRINGE, 3 ML
EACH MISCELLANEOUS
Qty: 1 EACH | Refills: 0 | Status: CANCELLED | OUTPATIENT
Start: 2024-12-19

## 2024-12-19 RX ORDER — INSULIN PUMP SYRINGE, 3 ML
EACH MISCELLANEOUS
Qty: 1 EACH | Refills: 0 | Status: SHIPPED | OUTPATIENT
Start: 2024-12-19

## 2024-12-19 NOTE — PROGRESS NOTES
"Carisa Curry  69 y.o. Black or  female  [id number]    Guadalupe Guzman DO  Patient Care Team:  Guadalupe Guzman DO as PCP - General (Internal Medicine)  Jeny Billy LPN as Care Coordinator (Internal Medicine)  Jonas Major OD as Consulting Physician (Optometry)  Ivania Mir DPM as Consulting Physician (Podiatry)  Bandar Piper MD as Consulting Physician (Psychiatry)    Chief Complaint:   Chief Complaint   Patient presents with    Follow-up     Hospital fu       History of Present Illness:  Pt is here for hospital follow-up visit and is new to me. She is here with her sister.    Hospitalized 12/13/24-12/15/24 after going to ED for altered mental status and was admitted for acute renal failure. Patient was afebrile without leukocytosis, hemodynamically stable, creatinine/GFR measuring 2.5/20, troponin, lactic acid, TSH, urine toxicology, UA, chest x-ray, CT head, and MRI brain negative. Upon discharge on 12/15/24 "Bun/Cr improved to 30/1.6. will hold her Losartan and HCTZ and KCl until seen by her PCP. Discharged with home health."    Patient denies any illness or symptoms prior to hospitalization except altered mental status including decreased talking and ability to walk. Patient notes she is feeling much better and pretty much back to baseline. Denies any CP, SOB, abdominal pain, nausea, vomiting, diarrhea, or blood in stool.    Patient needs a new glucose meter.    GFR 44.2 on 06/21/24; 20 on 12/13/24; 30 on 12/14/24  Will hold losartan, HCTZ, potassium until repeat CMP today.    HTN-- amlodipine 10 mg; metoprolol 25 mg  DM-- well controlled; metformin 500 mg BID; last A1C 5.2 in June 2024  HLD-- pravastatin 40 mg  CHF-- metoprolol 25 mg  Anemia-- denies symptoms or blood loss; due for colon cancer screening  Schizophrenia-- managed per psychiatry    The following were reviewed: active problem list, medication list, allergies, family history, social history, and " Health Maintenance.     History:  Past Medical History:   Diagnosis Date    Arthritis     CHF (congestive heart failure)     Diabetes mellitus, type 2     Diabetic retinopathy     Hyperlipidemia     Hypertension     Schizophrenia     Seizures     reported per pt and sister, nothing since childhood     Past Surgical History:   Procedure Laterality Date    EYE SURGERY      INJECTION OF ANESTHETIC AGENT AROUND NERVE Bilateral 12/09/2019    Procedure: Bilateral Genicular nerve block (DIAGNOSTIC, NO STEROID) with RN IV sedation;  Surgeon: Butch Chao MD;  Location: [facility];  Service: Pain Management;  Laterality: Bilateral;    RADIOFREQUENCY THERMOCOAGULATION Right 12/23/2019    Procedure: Right Genicular Nerve RFA (COOLED) with RN IV sedation;  Surgeon: Butch Chao MD;  Location: [facility];  Service: Pain Management;  Laterality: Right;    RADIOFREQUENCY THERMOCOAGULATION Left 01/06/2020    Procedure: Left Genicular Nerve RFA (COOLED) with RN IV sedation;  Surgeon: Butch Chao MD;  Location: [facility];  Service: Pain Management;  Laterality: Left;    SPINE SURGERY       Family History   Problem Relation Name Age of Onset    Kidney disease Mother      Heart failure Mother      Cataracts Mother      Hypertension Mother      Heart disease Mother      Stroke Mother      Cancer Father          brain    Diabetes Father      Diabetes Maternal Aunt      Hypertension Maternal Aunt      Diabetes Paternal Aunt      Hypertension Paternal Aunt      Heart disease Paternal Aunt      Diabetes Paternal Uncle      Heart disease Paternal Uncle       Social History     Socioeconomic History    Marital status: Single    Number of children: 0   Tobacco Use    Smoking status: Former     Types: Cigarettes    Smokeless tobacco: Never   Substance and Sexual Activity    Alcohol use: No     Alcohol/week: 0.0 standard drinks of alcohol    Drug use: No    Sexual activity: Never     Social Drivers of Health      Financial Resource Strain: Low Risk  (12/13/2024)    Overall Financial Resource Strain (CARDIA)     Difficulty of Paying Living Expenses: Not hard at all   Food Insecurity: No Food Insecurity (12/13/2024)    Hunger Vital Sign     Worried About Running Out of Food in the Last Year: Never true     Ran Out of Food in the Last Year: Never true   Transportation Needs: Patient Declined (12/13/2024)    TRANSPORTATION NEEDS     Transportation : Patient declined   Physical Activity: Insufficiently Active (1/26/2022)    Exercise Vital Sign     Days of Exercise per Week: 4 days     Minutes of Exercise per Session: 30 min   Stress: No Stress Concern Present (12/13/2024)    [employer] - Occupational Stress Questionnaire     Feeling of Stress : Not at all   Housing Stability: High Risk (12/13/2024)    Housing Stability Vital Sign     Unable to Pay for Housing in the Last Year: Yes     Homeless in the Last Year: No     Patient Active Problem List   Diagnosis    Hypertension goal BP (blood pressure) < 130/80    Arthritis involving multiple sites    Controlled type 2 diabetes mellitus with microalbuminuria, without long-term current use of insulin    Hyperlipidemia LDL goal <70    Severe obesity (BMI 35.0-39.9) with comorbidity    At risk for falls    Schizophrenia    Primary osteoarthritis of left knee    Primary osteoarthritis of right knee    Knee pain, chronic    Osteoporosis    Class 1 obesity due to excess calories with serious comorbidity and body mass index (BMI) of 32.0 to 32.9 in adult    CHF (congestive heart failure)     Review of patient's allergies indicates:  No Known Allergies    Health Maintenance  Health Maintenance Due   Topic Date Due    Colorectal Cancer Screening  Never done    RSV Vaccine (Age 60+ and Pregnant patients) (1 - Risk 60-74 years 1-dose series) Never done    Shingles Vaccine (2 of 3) 12/20/2017    Eye Exam  06/29/2023    Influenza Vaccine (1) 09/01/2024    COVID-19  "Vaccine (3 - 2024-25 season) 09/01/2024    Hemoglobin A1c  12/21/2024       Medications:  Current Outpatient Medications on File Prior to Visit   Medication Sig Dispense Refill    alendronate (FOSAMAX) 70 MG tablet Take 1 tablet by mouth once a week. 12 tablet 2    amLODIPine (NORVASC) 10 MG tablet Take 1 tablet (10 mg total) by mouth once daily. 90 tablet 1    ARIPiprazole (ABILIFY) 10 MG Tab Take 1 tablet (10 mg total) by mouth once daily. 30 tablet 1    benztropine (COGENTIN) 1 MG tablet Take 1/2 to 1 tablet twice daily as needed for side effects. 60 tablet 5    cholecalciferol, vitamin D3, (VITAMIN D3) 25 mcg (1,000 unit) capsule Take 1 capsule (1,000 Units total) by mouth once daily. 30 capsule 11    insulin needles, disposable, (PEN NEEDLE) 31 X 5/16 " Ndle Once a day insulin injection. 30 each 6    metFORMIN (GLUCOPHAGE-XR) 500 MG ER 24hr tablet Take 1 tablet (500 mg total) by mouth 2 (two) times daily with meals. 180 tablet 1    metoprolol succinate (TOPROL-XL) 25 MG 24 hr tablet Take 1 tablet (25 mg total) by mouth once daily. 90 tablet 1    pravastatin (PRAVACHOL) 40 MG tablet Take 1 tablet (40 mg total) by mouth once daily. 90 tablet 1    traZODone (DESYREL) 100 MG tablet Take 1/2 to 1 tablet at bedtime as needed for sleep. 30 tablet 3    [DISCONTINUED] blood sugar diagnostic (FREESTYLE LITE STRIPS) Strp 1 strip by Misc.(Non-Drug; Combo Route) route 2 (two) times a day. TEST BLOOD GLUCOSE 1 TIME DAILY 200 each 3    [DISCONTINUED] blood-glucose meter kit To check BG 1 times daily, to use with insurance preferred meter 1 each 0    [DISCONTINUED] FREESTYLE LANCETS 28 gauge lancets Apply topically once daily.      diclofenac sodium (VOLTAREN) 1 % Gel Apply 2 g topically 4 (four) times daily. for 10 days 200 g 1     No current facility-administered medications on file prior to visit.       Medications have been reviewed and reconciled with patient at visit today.    Laboratory Reviewed: (Yes)  Lab Results "   Component Value Date    WBC 4.94 12/15/2024    HGB 9.4 (L) 12/15/2024    HCT 28.3 (L) 12/15/2024     12/15/2024    CHOL 136 06/21/2024    TRIG 60 06/21/2024    HDL 56 06/21/2024    ALT 9 (L) 12/14/2024    AST 21 12/14/2024     12/15/2024    K 3.4 (L) 12/15/2024     12/15/2024    CREATININE 1.6 (H) 12/15/2024    BUN 30 (H) 12/15/2024    CO2 23 12/15/2024    TSH 1.099 12/13/2024    HGBA1C 5.2 06/21/2024       ROS:  Review of Systems   Constitutional:  Negative for chills and fever.   HENT:  Negative for congestion and sore throat.    Respiratory:  Negative for cough and shortness of breath.    Cardiovascular:  Negative for chest pain and leg swelling.   Gastrointestinal:  Negative for abdominal pain, constipation, diarrhea and vomiting.   Skin:  Negative for rash.   Neurological:  Negative for dizziness, weakness and headaches.       Exam:  Vitals:    12/19/24 0949   BP: 128/64   Resp: 12   Temp: 96.8 °F (36 °C)     Weight: 77.8 kg (171 lb 8.3 oz)   Body mass index is 31.37 kg/m².    Physical Exam  Vitals reviewed.   Constitutional:       General: She is awake. She is not in acute distress.     Appearance: She is not ill-appearing.   Eyes:      Conjunctiva/sclera: Conjunctivae normal.   Cardiovascular:      Rate and Rhythm: Normal rate and regular rhythm.      Heart sounds: Normal heart sounds. No murmur heard.  Pulmonary:      Effort: Pulmonary effort is normal. No respiratory distress.      Breath sounds: Normal breath sounds.   Musculoskeletal:         General: No swelling or deformity.      Comments: Walks with a cane at baseline   Skin:     General: Skin is warm and dry.   Neurological:      General: No focal deficit present.      Mental Status: She is alert.   Psychiatric:         Mood and Affect: Mood normal.         Behavior: Behavior normal.       Assessment:  The primary encounter diagnosis was Acute kidney injury. Diagnoses of Anemia, unspecified type, Hypertension goal BP (blood  pressure) < 130/80, Hyperlipidemia LDL goal <70, Congestive heart failure, unspecified HF chronicity, unspecified heart failure type, Controlled type 2 diabetes mellitus with microalbuminuria, without long-term current use of insulin, and Schizophrenia, unspecified type were also pertinent to this visit.    Plan:    1. Acute kidney injury  -     Comprehensive Metabolic Panel; Future; Expected date: 12/19/2024  -     CBC W/ AUTO DIFFERENTIAL; Future; Expected date: 12/19/2024  - Repeat labs to day to decide whether okay to restart HCTZ and losartan.    2. Anemia, unspecified type  - Worsening on hospital labs. Discussed important of completing home fecal screening test. Patient and sister verbalize understanding and have kit.    3. Hypertension goal BP (blood pressure) < 130/80  Continue amlodipine 10 mg; metoprolol 25 mg. Hold HCTZ, losartan, and potassium for now pending repeat CMP.    4. Hyperlipidemia LDL goal <70  - Continue pravastatin 40 mg.    5. Congestive heart failure, unspecified HF chronicity, unspecified heart failure type  - Continue metoprolol 25 mg.    6. Controlled type 2 diabetes mellitus with microalbuminuria, without long-term current use of insulin  -     HEMOGLOBIN A1C; Future; Expected date: 12/19/2024  -     lancets Misc; To check BG two times daily, to use with insurance preferred meter  Dispense: 100 each; Refill: 3  -     blood-glucose meter kit; To check BG two(2) times daily, to use with insurance preferred meter  Dispense: 1 each; Refill: 0  -     blood sugar diagnostic Strp; To check BG two times daily, to use with insurance preferred meter  Dispense: 200 each; Refill: 3  - Well controlled; metformin 500 mg BID; last A1C 5.2 in June 2024  - Patient needs a new glucometer. Prescription sent.    7. Schizophrenia, unspecified type  Overview:  She and her sisters report was diagnosed many years ago during a mental health hospitalization. Was on medication in past but nothing recently.   -  Managed per psychiatry      Care plan/goals: reviewed    Follow up: Follow up in about 11 days (around 12/30/2024).

## 2024-12-20 DIAGNOSIS — R80.9 CONTROLLED TYPE 2 DIABETES MELLITUS WITH MICROALBUMINURIA, WITHOUT LONG-TERM CURRENT USE OF INSULIN: Chronic | ICD-10-CM

## 2024-12-20 DIAGNOSIS — I10 HYPERTENSION GOAL BP (BLOOD PRESSURE) < 130/80: Chronic | ICD-10-CM

## 2024-12-20 DIAGNOSIS — E11.29 CONTROLLED TYPE 2 DIABETES MELLITUS WITH MICROALBUMINURIA, WITHOUT LONG-TERM CURRENT USE OF INSULIN: Chronic | ICD-10-CM

## 2024-12-20 NOTE — TELEPHONE ENCOUNTER
Refill Routing Note   Medication(s) are not appropriate for processing by [facility] for the following reason(s):        ED/Hospital Visit since last OV with provider    [facility] action(s):  Defer     Requires labs : Yes             Appointments  past 12m or future 3m with PCP    Date Provider   Last Visit   7/30/2024 Guadalupe Guzman, DO   Next Visit   1/30/2025 Guadalupe Guzman DO   ED visits in past 90 days: 0        Note composed:5:08 PM 12/20/2024

## 2024-12-20 NOTE — TELEPHONE ENCOUNTER
Care Due:                  Date            Visit Type   Department     Provider  --------------------------------------------------------------------------------                                EP -                              PRIMARY      [facility] INTERNAL  Last Visit: 07-      [facility]ARE ([facility])   HARESH Guzman                              EP -                              PRIMARY      [facility] INTERNAL  Next Visit: 01-      [facility] ([facility])   HARESH Guzman                                                            Last  Test          Frequency    Reason                     Performed    Due Date  --------------------------------------------------------------------------------    Mg Level....  12 months..  alendronate..............  Not Found    Overdue    Phosphate...  12 months..  alendronate..............  Not Found    Overdue    [employer] Embedded Care Due Messages. Reference number: [id number].   12/20/2024 2:19:50 PM CST

## 2024-12-24 RX ORDER — METFORMIN HYDROCHLORIDE 500 MG/1
500 TABLET, EXTENDED RELEASE ORAL 2 TIMES DAILY WITH MEALS
Qty: 180 TABLET | Refills: 2 | Status: SHIPPED | OUTPATIENT
Start: 2024-12-24

## 2024-12-24 RX ORDER — AMLODIPINE BESYLATE 10 MG/1
10 TABLET ORAL DAILY
Qty: 90 TABLET | Refills: 2 | Status: SHIPPED | OUTPATIENT
Start: 2024-12-24

## 2025-01-13 DIAGNOSIS — E87.6 DIURETIC-INDUCED HYPOKALEMIA: ICD-10-CM

## 2025-01-13 DIAGNOSIS — T50.2X5A DIURETIC-INDUCED HYPOKALEMIA: ICD-10-CM

## 2025-01-13 DIAGNOSIS — I10 HYPERTENSION GOAL BP (BLOOD PRESSURE) < 130/80: Primary | ICD-10-CM

## 2025-01-13 NOTE — TELEPHONE ENCOUNTER
Spoke with the patient asking to get her medicine back after it was placed on hold until her lab test result done. Last OV 12/19/24. Please advise.

## 2025-01-13 NOTE — TELEPHONE ENCOUNTER
No care due was identified.  [facility] Embedded Care Due Messages. Reference number: [id number].   1/13/2025 11:23:08 AM CST

## 2025-01-13 NOTE — TELEPHONE ENCOUNTER
----- Message from Luh sent at 1/13/2025 11:03 AM CST -----  Contact: self  Type:  Needs Medical Advice    Who Called: self  Symptoms (please be specific): pt is needing to speak to nurse about the four medicines they took her off of and then put her back on them. Please call pt.  Would the patient rather a call back or a response via [facility]? call  Best Call Back Number: [phone] (home)     Additional Information: please advise and thank you.

## 2025-01-14 RX ORDER — POTASSIUM CHLORIDE 20 MEQ/1
20 TABLET, EXTENDED RELEASE ORAL DAILY
Qty: 90 TABLET | Refills: 3 | Status: SHIPPED | OUTPATIENT
Start: 2025-01-14

## 2025-01-14 RX ORDER — HYDROCHLOROTHIAZIDE 12.5 MG/1
25 TABLET ORAL DAILY
Qty: 90 TABLET | Refills: 3 | Status: SHIPPED | OUTPATIENT
Start: 2025-01-14

## 2025-01-14 RX ORDER — LOSARTAN POTASSIUM 100 MG/1
100 TABLET ORAL DAILY
Qty: 90 TABLET | Refills: 3 | Status: SHIPPED | OUTPATIENT
Start: 2025-01-14

## 2025-01-15 DIAGNOSIS — I10 HYPERTENSION GOAL BP (BLOOD PRESSURE) < 130/80: Chronic | ICD-10-CM

## 2025-01-15 DIAGNOSIS — E78.5 HYPERLIPIDEMIA LDL GOAL <70: Chronic | ICD-10-CM

## 2025-01-15 NOTE — TELEPHONE ENCOUNTER
No care due was identified.  [facility] Embedded Care Due Messages. Reference number: [id number].   1/15/2025 2:09:24 PM CST

## 2025-01-15 NOTE — TELEPHONE ENCOUNTER
Refill Routing Note   Medication(s) are not appropriate for processing by [facility] for the following reason(s):        ED/Hospital Visit since last OV with provider    [facility] action(s):  Defer               Appointments  past 12m or future 3m with PCP    Date Provider   Last Visit   7/30/2024 Guadalupe Guzman,    Next Visit   1/30/2025 Guadalupe Guzman DO   ED visits in past 90 days: 0        Note composed:4:10 PM 01/15/2025

## 2025-01-16 RX ORDER — METOPROLOL SUCCINATE 25 MG/1
25 TABLET, EXTENDED RELEASE ORAL DAILY
Qty: 90 TABLET | Refills: 3 | Status: SHIPPED | OUTPATIENT
Start: 2025-01-16

## 2025-01-16 RX ORDER — PRAVASTATIN SODIUM 40 MG/1
40 TABLET ORAL DAILY
Qty: 90 TABLET | Refills: 3 | Status: SHIPPED | OUTPATIENT
Start: 2025-01-16

## 2025-01-29 ENCOUNTER — LAB VISIT (OUTPATIENT)
Dept: LAB | Facility: HOSPITAL | Age: 70
End: 2025-01-29
Attending: INTERNAL MEDICINE
Payer: COMMERCIAL

## 2025-01-29 DIAGNOSIS — R80.9 CONTROLLED TYPE 2 DIABETES MELLITUS WITH MICROALBUMINURIA, WITHOUT LONG-TERM CURRENT USE OF INSULIN: ICD-10-CM

## 2025-01-29 DIAGNOSIS — E78.5 HYPERLIPIDEMIA LDL GOAL <70: ICD-10-CM

## 2025-01-29 DIAGNOSIS — E11.29 CONTROLLED TYPE 2 DIABETES MELLITUS WITH MICROALBUMINURIA, WITHOUT LONG-TERM CURRENT USE OF INSULIN: ICD-10-CM

## 2025-01-29 DIAGNOSIS — D64.9 NORMOCYTIC ANEMIA: ICD-10-CM

## 2025-01-29 LAB
ALBUMIN SERPL BCP-MCNC: 3.7 G/DL (ref 3.5–5.2)
ALP SERPL-CCNC: 82 U/L (ref 40–150)
ALT SERPL W/O P-5'-P-CCNC: 7 U/L (ref 10–44)
ANION GAP SERPL CALC-SCNC: 13 MMOL/L (ref 8–16)
AST SERPL-CCNC: 10 U/L (ref 10–40)
BASOPHILS # BLD AUTO: 0.05 K/UL (ref 0–0.2)
BASOPHILS NFR BLD: 0.9 % (ref 0–1.9)
BILIRUB SERPL-MCNC: 0.6 MG/DL (ref 0.1–1)
BUN SERPL-MCNC: 20 MG/DL (ref 8–23)
CALCIUM SERPL-MCNC: 9.8 MG/DL (ref 8.7–10.5)
CHLORIDE SERPL-SCNC: 107 MMOL/L (ref 95–110)
CHOLEST SERPL-MCNC: 142 MG/DL (ref 120–199)
CHOLEST/HDLC SERPL: 2.4 {RATIO} (ref 2–5)
CO2 SERPL-SCNC: 20 MMOL/L (ref 23–29)
CREAT SERPL-MCNC: 1.3 MG/DL (ref 0.5–1.4)
DIFFERENTIAL METHOD BLD: ABNORMAL
EOSINOPHIL # BLD AUTO: 0 K/UL (ref 0–0.5)
EOSINOPHIL NFR BLD: 0.7 % (ref 0–8)
ERYTHROCYTE [DISTWIDTH] IN BLOOD BY AUTOMATED COUNT: 15.4 % (ref 11.5–14.5)
EST. GFR  (NO RACE VARIABLE): 44 ML/MIN/1.73 M^2
ESTIMATED AVG GLUCOSE: 94 MG/DL (ref 68–131)
FERRITIN SERPL-MCNC: 112 NG/ML (ref 20–300)
GLUCOSE SERPL-MCNC: 66 MG/DL (ref 70–110)
HBA1C MFR BLD: 4.9 % (ref 4–5.6)
HCT VFR BLD AUTO: 30.1 % (ref 37–48.5)
HDLC SERPL-MCNC: 58 MG/DL (ref 40–75)
HDLC SERPL: 40.8 % (ref 20–50)
HGB BLD-MCNC: 10 G/DL (ref 12–16)
IMM GRANULOCYTES # BLD AUTO: 0.01 K/UL (ref 0–0.04)
IMM GRANULOCYTES NFR BLD AUTO: 0.2 % (ref 0–0.5)
IRON SERPL-MCNC: 37 UG/DL (ref 30–160)
LDLC SERPL CALC-MCNC: 71 MG/DL (ref 63–159)
LYMPHOCYTES # BLD AUTO: 2.1 K/UL (ref 1–4.8)
LYMPHOCYTES NFR BLD: 38.1 % (ref 18–48)
MCH RBC QN AUTO: 28 PG (ref 27–31)
MCHC RBC AUTO-ENTMCNC: 33.2 G/DL (ref 32–36)
MCV RBC AUTO: 84 FL (ref 82–98)
MONOCYTES # BLD AUTO: 0.5 K/UL (ref 0.3–1)
MONOCYTES NFR BLD: 8.3 % (ref 4–15)
NEUTROPHILS # BLD AUTO: 2.8 K/UL (ref 1.8–7.7)
NEUTROPHILS NFR BLD: 51.8 % (ref 38–73)
NONHDLC SERPL-MCNC: 84 MG/DL
NRBC BLD-RTO: 0 /100 WBC
PLATELET # BLD AUTO: 251 K/UL (ref 150–450)
PMV BLD AUTO: 12.9 FL (ref 9.2–12.9)
POTASSIUM SERPL-SCNC: 4 MMOL/L (ref 3.5–5.1)
PROT SERPL-MCNC: 8.2 G/DL (ref 6–8.4)
RBC # BLD AUTO: 3.57 M/UL (ref 4–5.4)
SATURATED IRON: 15 % (ref 20–50)
SODIUM SERPL-SCNC: 140 MMOL/L (ref 136–145)
TOTAL IRON BINDING CAPACITY: 246 UG/DL (ref 250–450)
TRANSFERRIN SERPL-MCNC: 166 MG/DL (ref 200–375)
TRIGL SERPL-MCNC: 65 MG/DL (ref 30–150)
WBC # BLD AUTO: 5.43 K/UL (ref 3.9–12.7)

## 2025-01-29 PROCEDURE — 36415 COLL VENOUS BLD VENIPUNCTURE: CPT | Performed by: INTERNAL MEDICINE

## 2025-01-29 PROCEDURE — 80053 COMPREHEN METABOLIC PANEL: CPT | Performed by: INTERNAL MEDICINE

## 2025-01-29 PROCEDURE — 82728 ASSAY OF FERRITIN: CPT | Performed by: INTERNAL MEDICINE

## 2025-01-29 PROCEDURE — 83036 HEMOGLOBIN GLYCOSYLATED A1C: CPT | Performed by: INTERNAL MEDICINE

## 2025-01-29 PROCEDURE — 85025 COMPLETE CBC W/AUTO DIFF WBC: CPT | Performed by: INTERNAL MEDICINE

## 2025-01-29 PROCEDURE — 83540 ASSAY OF IRON: CPT | Performed by: INTERNAL MEDICINE

## 2025-01-29 PROCEDURE — 80061 LIPID PANEL: CPT | Performed by: INTERNAL MEDICINE

## 2025-01-30 ENCOUNTER — OFFICE VISIT (OUTPATIENT)
Dept: INTERNAL MEDICINE | Facility: CLINIC | Age: 70
End: 2025-01-30
Payer: COMMERCIAL

## 2025-01-30 VITALS
DIASTOLIC BLOOD PRESSURE: 66 MMHG | BODY MASS INDEX: 31.24 KG/M2 | SYSTOLIC BLOOD PRESSURE: 128 MMHG | HEIGHT: 62 IN | OXYGEN SATURATION: 96 % | RESPIRATION RATE: 20 BRPM | TEMPERATURE: 98 F | HEART RATE: 68 BPM | WEIGHT: 169.75 LBS

## 2025-01-30 DIAGNOSIS — E11.22 CONTROLLED TYPE 2 DIABETES MELLITUS WITH STAGE 3 CHRONIC KIDNEY DISEASE, WITHOUT LONG-TERM CURRENT USE OF INSULIN: ICD-10-CM

## 2025-01-30 DIAGNOSIS — M81.0 AGE-RELATED OSTEOPOROSIS WITHOUT CURRENT PATHOLOGICAL FRACTURE: ICD-10-CM

## 2025-01-30 DIAGNOSIS — F20.9 SCHIZOPHRENIA, UNSPECIFIED TYPE: Chronic | ICD-10-CM

## 2025-01-30 DIAGNOSIS — D50.9 IRON DEFICIENCY ANEMIA, UNSPECIFIED IRON DEFICIENCY ANEMIA TYPE: ICD-10-CM

## 2025-01-30 DIAGNOSIS — Z00.00 ANNUAL PHYSICAL EXAM: Primary | ICD-10-CM

## 2025-01-30 DIAGNOSIS — E78.5 HYPERLIPIDEMIA LDL GOAL <70: Chronic | ICD-10-CM

## 2025-01-30 DIAGNOSIS — I10 HYPERTENSION GOAL BP (BLOOD PRESSURE) < 130/80: Chronic | ICD-10-CM

## 2025-01-30 DIAGNOSIS — N18.30 CONTROLLED TYPE 2 DIABETES MELLITUS WITH STAGE 3 CHRONIC KIDNEY DISEASE, WITHOUT LONG-TERM CURRENT USE OF INSULIN: ICD-10-CM

## 2025-01-30 DIAGNOSIS — Z23 NEED FOR VACCINATION: ICD-10-CM

## 2025-01-30 PROCEDURE — 4010F ACE/ARB THERAPY RXD/TAKEN: CPT | Mod: CPTII,S$GLB,, | Performed by: INTERNAL MEDICINE

## 2025-01-30 PROCEDURE — 3078F DIAST BP <80 MM HG: CPT | Mod: CPTII,S$GLB,, | Performed by: INTERNAL MEDICINE

## 2025-01-30 PROCEDURE — 1159F MED LIST DOCD IN RCRD: CPT | Mod: CPTII,S$GLB,, | Performed by: INTERNAL MEDICINE

## 2025-01-30 PROCEDURE — 3044F HG A1C LEVEL LT 7.0%: CPT | Mod: CPTII,S$GLB,, | Performed by: INTERNAL MEDICINE

## 2025-01-30 PROCEDURE — 90653 IIV ADJUVANT VACCINE IM: CPT | Mod: S$GLB,,, | Performed by: INTERNAL MEDICINE

## 2025-01-30 PROCEDURE — G0008 ADMIN INFLUENZA VIRUS VAC: HCPCS | Mod: S$GLB,,, | Performed by: INTERNAL MEDICINE

## 2025-01-30 PROCEDURE — 1160F RVW MEDS BY RX/DR IN RCRD: CPT | Mod: CPTII,S$GLB,, | Performed by: INTERNAL MEDICINE

## 2025-01-30 PROCEDURE — 3288F FALL RISK ASSESSMENT DOCD: CPT | Mod: CPTII,S$GLB,, | Performed by: INTERNAL MEDICINE

## 2025-01-30 PROCEDURE — 1126F AMNT PAIN NOTED NONE PRSNT: CPT | Mod: CPTII,S$GLB,, | Performed by: INTERNAL MEDICINE

## 2025-01-30 PROCEDURE — 99999 PR PBB SHADOW E&M-EST. PATIENT-LVL V: CPT | Mod: PBBFAC,,, | Performed by: INTERNAL MEDICINE

## 2025-01-30 PROCEDURE — 99214 OFFICE O/P EST MOD 30 MIN: CPT | Mod: S$GLB,,, | Performed by: INTERNAL MEDICINE

## 2025-01-30 PROCEDURE — 3074F SYST BP LT 130 MM HG: CPT | Mod: CPTII,S$GLB,, | Performed by: INTERNAL MEDICINE

## 2025-01-30 PROCEDURE — 3008F BODY MASS INDEX DOCD: CPT | Mod: CPTII,S$GLB,, | Performed by: INTERNAL MEDICINE

## 2025-01-30 PROCEDURE — G2211 COMPLEX E/M VISIT ADD ON: HCPCS | Mod: S$GLB,,, | Performed by: INTERNAL MEDICINE

## 2025-01-30 PROCEDURE — 1101F PT FALLS ASSESS-DOCD LE1/YR: CPT | Mod: CPTII,S$GLB,, | Performed by: INTERNAL MEDICINE

## 2025-01-30 RX ORDER — FERROUS SULFATE 325(65) MG
325 TABLET, DELAYED RELEASE (ENTERIC COATED) ORAL DAILY
Qty: 90 TABLET | Refills: 3 | Status: SHIPPED | OUTPATIENT
Start: 2025-01-30

## 2025-01-30 NOTE — PROGRESS NOTES
Carisa Curry  70 y.o. Black or  female  [id number]    Chief Complaint:  Chief Complaint   Patient presents with    Annual Exam       History of Present Illness:  History of Present Illness    CHIEF COMPLAINT:  Ms. Curry presents today for an annual visit.     HYPERTENSION:   Stable. Reports compliance with medication.     DIABETES:  She checks blood sugar daily and denies any episodes of hypoglycemia. A1C was 4.9.    CHRONIC KIDNEY DISEASE:   Recent JARAD has resolved. She denies symptoms. She denies taking NSAID's.     OSTEOPOROSIS:   Compliant with alendronate and vitamin D.     ANEMIA:   Not currently on a supplement.   Lab Results   Component Value Date    IRON 37 01/29/2025    TRANSFERRIN 166 (L) 01/29/2025    TIBC 246 (L) 01/29/2025    FESATURATED 15 (L) 01/29/2025      RECENT MEDICAL HISTORY:  She was hospitalized for two days one month ago due to dehydration. She has a history of severe bilateral foot swelling that significantly impaired ambulation, requiring assistance with transfers to bathroom. The swelling has since resolved completely.    PSYCHIATRIC:  She continues regular psychiatrist visits with no reported changes in condition.         Review of Systems   Constitutional:  Negative for fever.   Respiratory:  Negative for shortness of breath.    Cardiovascular:  Negative for chest pain and leg swelling.   Gastrointestinal:  Negative for abdominal pain, blood in stool, constipation and diarrhea.   Genitourinary:  Negative for dysuria.   Musculoskeletal:  Negative for joint pain.   Neurological:  Negative for dizziness, tingling and headaches.       Laboratory  Lab Results   Component Value Date    WBC 5.43 01/29/2025    HGB 10.0 (L) 01/29/2025    HCT 30.1 (L) 01/29/2025     01/29/2025    CHOL 142 01/29/2025    TRIG 65 01/29/2025    HDL 58 01/29/2025    ALT 7 (L) 01/29/2025    AST 10 01/29/2025     01/29/2025    K 4.0 01/29/2025     01/29/2025    CREATININE 1.3  01/29/2025    BUN 20 01/29/2025    CO2 20 (L) 01/29/2025    TSH 1.099 12/13/2024    HGBA1C 4.9 01/29/2025     Lab Results   Component Value Date    LDLCALC 71.0 01/29/2025       The following were reviewed: Active problem list, medication list, allergies, family history, social history, and Health Maintenance.     History:  Past Medical History:   Diagnosis Date    Arthritis     CHF (congestive heart failure)     Diabetes mellitus, type 2     Diabetic retinopathy     Hyperlipidemia     Hypertension     Schizophrenia     Seizures     reported per pt and sister, nothing since childhood     Past Surgical History:   Procedure Laterality Date    EYE SURGERY      INJECTION OF ANESTHETIC AGENT AROUND NERVE Bilateral 12/09/2019    Procedure: Bilateral Genicular nerve block (DIAGNOSTIC, NO STEROID) with RN IV sedation;  Surgeon: Butch Chao MD;  Location: [facility];  Service: Pain Management;  Laterality: Bilateral;    RADIOFREQUENCY THERMOCOAGULATION Right 12/23/2019    Procedure: Right Genicular Nerve RFA (COOLED) with RN IV sedation;  Surgeon: Butch Chao MD;  Location: [facility];  Service: Pain Management;  Laterality: Right;    RADIOFREQUENCY THERMOCOAGULATION Left 01/06/2020    Procedure: Left Genicular Nerve RFA (COOLED) with RN IV sedation;  Surgeon: Butch Chao MD;  Location: [facility];  Service: Pain Management;  Laterality: Left;    SPINE SURGERY       Family History   Problem Relation Name Age of Onset    Kidney disease Mother      Heart failure Mother      Cataracts Mother      Hypertension Mother      Heart disease Mother      Stroke Mother      Cancer Father          brain    Diabetes Father      Diabetes Maternal Aunt      Hypertension Maternal Aunt      Diabetes Paternal Aunt      Hypertension Paternal Aunt      Heart disease Paternal Aunt      Diabetes Paternal Uncle      Heart disease Paternal Uncle       Social History     Socioeconomic History    Marital status: Single     Number of children: 0   Tobacco Use    Smoking status: Former     Types: Cigarettes    Smokeless tobacco: Never   Substance and Sexual Activity    Alcohol use: No     Alcohol/week: 0.0 standard drinks of alcohol    Drug use: No    Sexual activity: Never     Social Drivers of Health     Financial Resource Strain: Low Risk  (12/13/2024)    Overall Financial Resource Strain (CARDIA)     Difficulty of Paying Living Expenses: Not hard at all   Food Insecurity: No Food Insecurity (12/13/2024)    Hunger Vital Sign     Worried About Running Out of Food in the Last Year: Never true     Ran Out of Food in the Last Year: Never true   Transportation Needs: Patient Declined (12/13/2024)    TRANSPORTATION NEEDS     Transportation : Patient declined   Physical Activity: Insufficiently Active (1/26/2022)    Exercise Vital Sign     Days of Exercise per Week: 4 days     Minutes of Exercise per Session: 30 min   Stress: No Stress Concern Present (12/13/2024)    [employer] - Occupational Stress Questionnaire     Feeling of Stress : Not at all   Housing Stability: High Risk (12/13/2024)    Housing Stability Vital Sign     Unable to Pay for Housing in the Last Year: Yes     Homeless in the Last Year: No     Patient Active Problem List   Diagnosis    Hypertension goal BP (blood pressure) < 130/80    Arthritis involving multiple sites    Controlled type 2 diabetes mellitus with microalbuminuria, without long-term current use of insulin    Hyperlipidemia LDL goal <70    Severe obesity (BMI 35.0-39.9) with comorbidity    At risk for falls    Schizophrenia    Primary osteoarthritis of left knee    Primary osteoarthritis of right knee    Knee pain, chronic    Osteoporosis    Class 1 obesity due to excess calories with serious comorbidity and body mass index (BMI) of 32.0 to 32.9 in adult    CHF (congestive heart failure)     Review of patient's allergies indicates:  No Known Allergies    Health Maintenance  Health  "Maintenance Topics with due status: Not Due       Topic Last Completion Date    TETANUS VACCINE 05/27/2019    DEXA Scan 01/03/2023    Diabetes Urine Screening 06/21/2024    Foot Exam 07/30/2024    Mammogram 07/30/2024    Lipid Panel 01/29/2025    Hemoglobin A1c 01/29/2025    Low Dose Statin 01/30/2025     Health Maintenance Due   Topic Date Due    RSV Vaccine (Age 60+ and Pregnant patients) (1 - Risk 60-74 years 1-dose series) Never done    Shingles Vaccine (2 of 3) 12/20/2017    Diabetic Eye Exam  06/29/2023    COVID-19 Vaccine (3 - 2024-25 season) 09/01/2024       Medications:  Current Outpatient Medications on File Prior to Visit   Medication Sig Dispense Refill    alendronate (FOSAMAX) 70 MG tablet Take 1 tablet by mouth once a week. 12 tablet 2    amLODIPine (NORVASC) 10 MG tablet Take 1 tablet (10 mg total) by mouth once daily. 90 tablet 2    ARIPiprazole (ABILIFY) 10 MG Tab Take 1 tablet (10 mg total) by mouth once daily. 30 tablet 1    benztropine (COGENTIN) 1 MG tablet Take 1/2 to 1 tablet twice daily as needed for side effects. 60 tablet 5    blood sugar diagnostic Strp To check BG two times daily, to use with insurance preferred meter 200 each 3    blood-glucose meter kit To check BG two(2) times daily, to use with insurance preferred meter 1 each 0    cholecalciferol, vitamin D3, (VITAMIN D3) 25 mcg (1,000 unit) capsule Take 1 capsule (1,000 Units total) by mouth once daily. 30 capsule 11    diclofenac sodium (VOLTAREN) 1 % Gel Apply 2 g topically 4 (four) times daily. for 10 days 200 g 1    hydroCHLOROthiazide (HYDRODIURIL) 12.5 MG Tab Take 2 tablets (25 mg total) by mouth once daily. 90 tablet 3    insulin needles, disposable, (PEN NEEDLE) 31 X 5/16 " Ndle Once a day insulin injection. 30 each 6    lancets Misc To check BG two times daily, to use with insurance preferred meter 100 each 3    losartan (COZAAR) 100 MG tablet Take 1 tablet (100 mg total) by mouth once daily. 90 tablet 3    metFORMIN " (GLUCOPHAGE-XR) 500 MG ER 24hr tablet Take 1 tablet (500 mg total) by mouth 2 (two) times daily with meals. 180 tablet 2    metoprolol succinate (TOPROL-XL) 25 MG 24 hr tablet Take 1 tablet (25 mg total) by mouth once daily. 90 tablet 3    potassium chloride SA (K-DUR,KLOR-CON) 20 MEQ tablet Take 1 tablet (20 mEq total) by mouth once daily. 90 tablet 3    pravastatin (PRAVACHOL) 40 MG tablet Take 1 tablet (40 mg total) by mouth once daily. 90 tablet 3    traZODone (DESYREL) 100 MG tablet Take 1/2 to 1 tablet at bedtime as needed for sleep. 30 tablet 3     No current facility-administered medications on file prior to visit.       Medications have been reviewed and reconciled with patient at visit today.    Exam:  Vitals:    01/30/25 1132   BP: 128/66   Pulse: 68   Resp: 20   Temp: 98.2 °F (36.8 °C)     Weight: 77 kg (169 lb 12.1 oz)   Body mass index is 31.05 kg/m².      Physical Exam    Vitals: Reviewed.  Constitutional: No acute distress. Well-developed. Not ill-appearing.  Eyes: No scleral icterus.  Cardiovascular: Normal rate and regular rhythm. Normal heart sounds.  Pulmonary: Pulmonary effort is normal. No respiratory distress. Normal breath sounds.  Abdomen: Soft. Nontender. Nondistended. Normoactive bowel sounds.  Extremities: No edema.  Skin: Warm. Dry.  Neurological: Alert .  Psychiatric: Behavior normal.         Assessment:  The primary encounter diagnosis was Annual physical exam. Diagnoses of Hypertension goal BP (blood pressure) < 130/80, Controlled type 2 diabetes mellitus with stage 3 chronic kidney disease, without long-term current use of insulin, Hyperlipidemia LDL goal <70, Age-related osteoporosis without current pathological fracture, Iron deficiency anemia, unspecified iron deficiency anemia type, Schizophrenia, unspecified type, and Need for vaccination were also pertinent to this visit.      Assessment & Plan    ANNUAL PHYSICAL EXAM:  - Counseled regarding age appropriate screenings and  immunizations  - Counseled regarding lifestyle modifications    HYPERTENSION:   - Continued current management.     DIABETES:  - Reviewed recent lab results, including A1C.  - Instructed the patient to continue daily glucose monitoring.  - Checked for peripheral neuropathy.  - Ms. Curry reports checking glucose every day and denies experiencing hypoglycemic episodes.  - Noted A1C is 4.9, which is excellent.  - Acknowledged the patient's good A1C result.    CKD III:   - Continued losartan.   - Advised to avoid NSAID's.     HYPERLIPIDEMIA:   - Continued pravastatin.     OSTEOPOROSIS:  - Continued alendronate and vitamin D.     ANEMIA:  - Reviewed recent lab results to assess anemia status.  - Noted improvement in anemia.  - Assessed for potential bleeding sources due to anemia.  - Evaluated the need for iron supplementation.  - Prescribed iron supplement to be taken daily.  - Evaluated the patient's current iron supplementation status.  - Assessed the need for iron supplementation.  - Prescribed iron supplement to be taken daily.    DEHYDRATION:  - Noted patient's previous hospitalization for dehydration.  - Assessed patient's fluid intake and urination patterns.  - Discussed recent hospitalization for dehydration.  - Educated on the importance of staying hydrated.  - Ms. Curry to maintain adequate fluid intake.    LABS:  - Reviewed recent lab results: cholesterol, kidney function.    PSYCHIATRIC CARE:  - Evaluated psychiatric care continuity.    DIETARY RESTRICTIONS:  - Ms. Curry to avoid ground meat consumption due to previous adverse reaction.    EYE HEALTH:  - Ms. Curry to attend upcoming eye exam appointment.    INFLUENZA VACCINATION:  - Administered flu vaccine in office.    RSV VACCINATION:  - Considered vaccination needs: RSV, shingles, and COVID.  - Ms. Curry to obtain RSV, shingles, and COVID vaccines from pharmacy.    SHINGLES VACCINATION:  - Considered vaccination needs: RSV, shingles, and  COVID.  - Ms. Curry to obtain RSV, shingles, and COVID vaccines from pharmacy.    COVID-19 VACCINATION:  - Considered vaccination needs: RSV, shingles, and COVID.  - Ms. Curry to obtain RSV, shingles, and COVID vaccines from pharmacy.    FOLLOW UP:  - Follow up in 6 months.

## 2025-04-11 ENCOUNTER — PATIENT OUTREACH (OUTPATIENT)
Dept: ADMINISTRATIVE | Facility: HOSPITAL | Age: 70
End: 2025-04-11
Payer: COMMERCIAL

## 2025-05-06 ENCOUNTER — TELEPHONE (OUTPATIENT)
Dept: OPHTHALMOLOGY | Facility: CLINIC | Age: 70
End: 2025-05-06
Payer: COMMERCIAL

## 2025-05-06 NOTE — TELEPHONE ENCOUNTER
----- Message from Summer sent at 5/6/2025  9:40 AM CDT -----  Contact: Carisa  Type:  Appointment RequestCaller is requesting a appointment. Name of Caller: Carisa When does caller want appointment? Thursday or Friday afternoon of this weekSymptoms/Reason for Visit: Annual  Would the patient rather a call back or a response via [facility]? Call back Best Call Back Number: [phone] Information:

## 2025-06-25 DIAGNOSIS — E11.9 TYPE 2 DIABETES MELLITUS WITHOUT COMPLICATION: ICD-10-CM

## 2025-06-25 DIAGNOSIS — Z78.0 MENOPAUSE: ICD-10-CM

## 2025-07-16 DIAGNOSIS — I10 HYPERTENSION GOAL BP (BLOOD PRESSURE) < 130/80: ICD-10-CM

## 2025-07-16 RX ORDER — HYDROCHLOROTHIAZIDE 12.5 MG/1
25 TABLET ORAL DAILY
Qty: 90 TABLET | Refills: 1 | Status: SHIPPED | OUTPATIENT
Start: 2025-07-16

## 2025-07-16 NOTE — TELEPHONE ENCOUNTER
Care Due:                  Date            Visit Type   Department     Provider  --------------------------------------------------------------------------------                                EP -                              PRIMARY      [facility] INTERNAL  Last Visit: 01-      CARE ([facility])   HARESH Guzman                              EP -                              PRIMARY      [facility] INTERNAL  Next Visit: 01-      CARE ([facility])   HARESH Guzman                                                            Last  Test          Frequency    Reason                     Performed    Due Date  --------------------------------------------------------------------------------    HBA1C.......  6 months...  metFORMIN................  01- 07-    Mg Level....  12 months..  alendronate..............  Not Found    Overdue    Phosphate...  12 months..  alendronate..............  Not Found    Overdue    [employer] Embedded Care Due Messages. Reference number: [id number].   7/16/2025 3:47:20 AM CDT

## 2025-07-16 NOTE — TELEPHONE ENCOUNTER
Provider Staff:  Action required for this patient    Requires labs      Please see care gap opportunities below in Care Due Message.    Thanks!  [facility]     Appointments      Date Provider   Last Visit   1/30/2025 Guadalupe Guzman DO   Next Visit   Visit date not found Guadalupe Guzman DO     Refill Decision Note   Carisa Curry  is requesting a refill authorization.  Brief Assessment and Rationale for Refill:  Approve     Medication Therapy Plan:        Comments:     Note composed:12:22 PM 07/16/2025

## 2025-07-17 ENCOUNTER — APPOINTMENT (OUTPATIENT)
Dept: RADIOLOGY | Facility: HOSPITAL | Age: 70
End: 2025-07-17
Attending: INTERNAL MEDICINE
Payer: COMMERCIAL

## 2025-07-17 DIAGNOSIS — M17.11 PRIMARY OSTEOARTHRITIS OF RIGHT KNEE: ICD-10-CM

## 2025-07-17 DIAGNOSIS — M17.12 PRIMARY OSTEOARTHRITIS OF LEFT KNEE: ICD-10-CM

## 2025-07-17 DIAGNOSIS — Z78.0 MENOPAUSE: ICD-10-CM

## 2025-07-17 PROCEDURE — 77080 DXA BONE DENSITY AXIAL: CPT | Mod: 26,,, | Performed by: RADIOLOGY

## 2025-07-17 PROCEDURE — 77080 DXA BONE DENSITY AXIAL: CPT | Mod: TC

## 2025-07-17 NOTE — TELEPHONE ENCOUNTER
Refill Routing Note   Medication(s) are not appropriate for processing by [facility] for the following reason(s):        Outside of protocol    [facility] action(s):  Route             Appointments  past 12m or future 3m with PCP    Date Provider   Last Visit   1/30/2025 Guadalupe Guzman DO   Next Visit   Visit date not found Guadalupe Guzman DO   ED visits in past 90 days: 0        Note composed:4:14 PM 07/17/2025

## 2025-07-17 NOTE — TELEPHONE ENCOUNTER
No care due was identified.  [facility] Embedded Care Due Messages. Reference number: [id number].   7/17/2025 3:46:38 PM CDT

## 2025-07-18 RX ORDER — DICLOFENAC SODIUM 10 MG/G
2 GEL TOPICAL 4 TIMES DAILY
Qty: 200 G | Refills: 1 | Status: SHIPPED | OUTPATIENT
Start: 2025-07-18 | End: 2025-07-28

## 2025-08-07 ENCOUNTER — OFFICE VISIT (OUTPATIENT)
Dept: OPHTHALMOLOGY | Facility: CLINIC | Age: 70
End: 2025-08-07
Payer: COMMERCIAL

## 2025-08-07 DIAGNOSIS — H52.7 REFRACTIVE ERRORS: ICD-10-CM

## 2025-08-07 DIAGNOSIS — E11.36 DIABETIC CATARACT: ICD-10-CM

## 2025-08-07 DIAGNOSIS — E11.9 DIABETES MELLITUS TYPE 2 WITHOUT RETINOPATHY: Primary | ICD-10-CM

## 2025-08-07 PROCEDURE — 3044F HG A1C LEVEL LT 7.0%: CPT | Mod: CPTII,S$GLB,, | Performed by: OPTOMETRIST

## 2025-08-07 PROCEDURE — 92014 COMPRE OPH EXAM EST PT 1/>: CPT | Mod: S$GLB,,, | Performed by: OPTOMETRIST

## 2025-08-07 PROCEDURE — 92015 DETERMINE REFRACTIVE STATE: CPT | Mod: S$GLB,,, | Performed by: OPTOMETRIST

## 2025-08-07 PROCEDURE — 2023F DILAT RTA XM W/O RTNOPTHY: CPT | Mod: CPTII,S$GLB,, | Performed by: OPTOMETRIST

## 2025-08-07 PROCEDURE — 99999 PR PBB SHADOW E&M-EST. PATIENT-LVL III: CPT | Mod: PBBFAC,,, | Performed by: OPTOMETRIST

## 2025-08-07 PROCEDURE — 1159F MED LIST DOCD IN RCRD: CPT | Mod: CPTII,S$GLB,, | Performed by: OPTOMETRIST

## 2025-08-07 PROCEDURE — 4010F ACE/ARB THERAPY RXD/TAKEN: CPT | Mod: CPTII,S$GLB,, | Performed by: OPTOMETRIST

## 2025-08-07 NOTE — PROGRESS NOTES
SUBJECTIVE  Carisa Curry is 70 y.o. female  Uncorrected distance visual acuity was 20/50 -2 in the right eye and 20/50 +1 in the left eye.   Chief Complaint   Patient presents with    Diabetic Eye Exam     Pt states VA is stable.  No complaints  No pain or discomfort  No flashes or floaters          HPI     Diabetic Eye Exam     Additional comments: Pt states VA is stable.  No complaints  No pain or discomfort  No flashes or floaters           Comments    Lab Results       Component                Value               Date                       HGBA1C                   4.9                 01/29/2025                      Last edited by Teresa Hodges on 8/7/2025  2:37 PM.         Assessment /Plan :  1. Diabetes mellitus type 2 without retinopathy  No Background Diabetic Retinopathy  Strict BG control, f/u w/ PCP, and annual DFE  Stressed importance of DM control to preserve vision     2. Diabetic cataract  Cataracts are not visually significant and not affecting activities of daily living.   Annual observation is recommended at this time.   Patient to call or return to clinic with any significant change in vision prior to next visit.    3. Refractive errors  Dispense Final Rx for glasses.  RTC 1 year  Discussed above and answered questions.             1

## 2025-08-28 ENCOUNTER — OFFICE VISIT (OUTPATIENT)
Dept: INTERNAL MEDICINE | Facility: CLINIC | Age: 70
End: 2025-08-28
Payer: COMMERCIAL

## 2025-08-28 VITALS
OXYGEN SATURATION: 88 % | WEIGHT: 169.75 LBS | DIASTOLIC BLOOD PRESSURE: 70 MMHG | TEMPERATURE: 98 F | SYSTOLIC BLOOD PRESSURE: 146 MMHG | BODY MASS INDEX: 31.24 KG/M2 | HEIGHT: 62 IN | HEART RATE: 100 BPM

## 2025-08-28 DIAGNOSIS — I50.9 CONGESTIVE HEART FAILURE, UNSPECIFIED HF CHRONICITY, UNSPECIFIED HEART FAILURE TYPE: Primary | ICD-10-CM

## 2025-08-28 DIAGNOSIS — Z79.899 ENCOUNTER FOR LONG-TERM (CURRENT) USE OF MEDICATIONS: ICD-10-CM

## 2025-08-28 DIAGNOSIS — I10 HYPERTENSION GOAL BP (BLOOD PRESSURE) < 130/80: Chronic | ICD-10-CM

## 2025-08-28 DIAGNOSIS — Z12.31 ENCOUNTER FOR SCREENING MAMMOGRAM FOR BREAST CANCER: ICD-10-CM

## 2025-08-28 DIAGNOSIS — E66.01 SEVERE OBESITY (BMI 35.0-39.9) WITH COMORBIDITY: ICD-10-CM

## 2025-08-28 DIAGNOSIS — M25.569 CHRONIC KNEE PAIN, UNSPECIFIED LATERALITY: ICD-10-CM

## 2025-08-28 DIAGNOSIS — E61.1 IRON DEFICIENCY: ICD-10-CM

## 2025-08-28 DIAGNOSIS — R80.9 CONTROLLED TYPE 2 DIABETES MELLITUS WITH MICROALBUMINURIA, WITHOUT LONG-TERM CURRENT USE OF INSULIN: Chronic | ICD-10-CM

## 2025-08-28 DIAGNOSIS — G89.29 CHRONIC KNEE PAIN, UNSPECIFIED LATERALITY: ICD-10-CM

## 2025-08-28 DIAGNOSIS — E78.5 HYPERLIPIDEMIA LDL GOAL <70: Chronic | ICD-10-CM

## 2025-08-28 DIAGNOSIS — F20.9 SCHIZOPHRENIA, UNSPECIFIED TYPE: Chronic | ICD-10-CM

## 2025-08-28 DIAGNOSIS — M12.9 ARTHRITIS INVOLVING MULTIPLE SITES: ICD-10-CM

## 2025-08-28 DIAGNOSIS — M81.0 OSTEOPOROSIS, UNSPECIFIED OSTEOPOROSIS TYPE, UNSPECIFIED PATHOLOGICAL FRACTURE PRESENCE: ICD-10-CM

## 2025-08-28 DIAGNOSIS — Z91.81 AT RISK FOR FALLS: ICD-10-CM

## 2025-08-28 DIAGNOSIS — E11.29 CONTROLLED TYPE 2 DIABETES MELLITUS WITH MICROALBUMINURIA, WITHOUT LONG-TERM CURRENT USE OF INSULIN: Chronic | ICD-10-CM

## 2025-08-28 PROCEDURE — 3078F DIAST BP <80 MM HG: CPT | Mod: CPTII,S$GLB,, | Performed by: PHYSICIAN ASSISTANT

## 2025-08-28 PROCEDURE — 4010F ACE/ARB THERAPY RXD/TAKEN: CPT | Mod: CPTII,S$GLB,, | Performed by: PHYSICIAN ASSISTANT

## 2025-08-28 PROCEDURE — 1126F AMNT PAIN NOTED NONE PRSNT: CPT | Mod: CPTII,S$GLB,, | Performed by: PHYSICIAN ASSISTANT

## 2025-08-28 PROCEDURE — 3008F BODY MASS INDEX DOCD: CPT | Mod: CPTII,S$GLB,, | Performed by: PHYSICIAN ASSISTANT

## 2025-08-28 PROCEDURE — 3288F FALL RISK ASSESSMENT DOCD: CPT | Mod: CPTII,S$GLB,, | Performed by: PHYSICIAN ASSISTANT

## 2025-08-28 PROCEDURE — 1159F MED LIST DOCD IN RCRD: CPT | Mod: CPTII,S$GLB,, | Performed by: PHYSICIAN ASSISTANT

## 2025-08-28 PROCEDURE — 3044F HG A1C LEVEL LT 7.0%: CPT | Mod: CPTII,S$GLB,, | Performed by: PHYSICIAN ASSISTANT

## 2025-08-28 PROCEDURE — 1101F PT FALLS ASSESS-DOCD LE1/YR: CPT | Mod: CPTII,S$GLB,, | Performed by: PHYSICIAN ASSISTANT

## 2025-08-28 PROCEDURE — 99214 OFFICE O/P EST MOD 30 MIN: CPT | Mod: S$GLB,,, | Performed by: PHYSICIAN ASSISTANT

## 2025-08-28 PROCEDURE — 99999 PR PBB SHADOW E&M-EST. PATIENT-LVL V: CPT | Mod: PBBFAC,,, | Performed by: PHYSICIAN ASSISTANT

## 2025-08-28 PROCEDURE — 3077F SYST BP >= 140 MM HG: CPT | Mod: CPTII,S$GLB,, | Performed by: PHYSICIAN ASSISTANT

## 2025-09-02 ENCOUNTER — PATIENT OUTREACH (OUTPATIENT)
Dept: ADMINISTRATIVE | Facility: HOSPITAL | Age: 70
End: 2025-09-02
Payer: COMMERCIAL